# Patient Record
Sex: FEMALE | Race: WHITE | NOT HISPANIC OR LATINO | Employment: OTHER | URBAN - METROPOLITAN AREA
[De-identification: names, ages, dates, MRNs, and addresses within clinical notes are randomized per-mention and may not be internally consistent; named-entity substitution may affect disease eponyms.]

---

## 2017-01-13 ENCOUNTER — PATIENT MESSAGE (OUTPATIENT)
Dept: ENDOCRINOLOGY | Facility: CLINIC | Age: 52
End: 2017-01-13

## 2017-06-21 ENCOUNTER — LAB VISIT (OUTPATIENT)
Dept: LAB | Facility: HOSPITAL | Age: 52
End: 2017-06-21
Attending: INTERNAL MEDICINE
Payer: COMMERCIAL

## 2017-06-21 DIAGNOSIS — E03.9 HYPOTHYROIDISM: ICD-10-CM

## 2017-06-21 LAB
T4 FREE SERPL-MCNC: 0.63 NG/DL
TSH SERPL DL<=0.005 MIU/L-ACNC: 7.18 UIU/ML

## 2017-06-21 PROCEDURE — 36415 COLL VENOUS BLD VENIPUNCTURE: CPT | Mod: PO

## 2017-06-21 PROCEDURE — 84439 ASSAY OF FREE THYROXINE: CPT

## 2017-06-21 PROCEDURE — 84443 ASSAY THYROID STIM HORMONE: CPT

## 2017-06-22 ENCOUNTER — OFFICE VISIT (OUTPATIENT)
Dept: ENDOCRINOLOGY | Facility: CLINIC | Age: 52
End: 2017-06-22
Payer: COMMERCIAL

## 2017-06-22 VITALS
DIASTOLIC BLOOD PRESSURE: 78 MMHG | RESPIRATION RATE: 20 BRPM | SYSTOLIC BLOOD PRESSURE: 112 MMHG | BODY MASS INDEX: 28.34 KG/M2 | WEIGHT: 159.94 LBS | HEART RATE: 76 BPM | HEIGHT: 63 IN

## 2017-06-22 DIAGNOSIS — E03.9 HYPOTHYROIDISM, UNSPECIFIED TYPE: Primary | ICD-10-CM

## 2017-06-22 PROCEDURE — 99213 OFFICE O/P EST LOW 20 MIN: CPT | Mod: S$GLB,,, | Performed by: INTERNAL MEDICINE

## 2017-06-22 PROCEDURE — 99999 PR PBB SHADOW E&M-EST. PATIENT-LVL III: CPT | Mod: PBBFAC,,, | Performed by: INTERNAL MEDICINE

## 2017-06-22 RX ORDER — BUPROPION HYDROCHLORIDE 300 MG/1
300 TABLET ORAL DAILY
COMMUNITY
Start: 2017-06-11 | End: 2019-09-16

## 2017-06-22 RX ORDER — METHYLPREDNISOLONE 4 MG/1
TABLET ORAL
COMMUNITY
Start: 2017-05-16 | End: 2017-12-27 | Stop reason: ALTCHOICE

## 2017-06-22 RX ORDER — ROSUVASTATIN CALCIUM 10 MG/1
10 TABLET, COATED ORAL DAILY
COMMUNITY
Start: 2017-06-02 | End: 2023-06-05 | Stop reason: SDUPTHER

## 2017-06-22 RX ORDER — QUETIAPINE FUMARATE 100 MG/1
TABLET, FILM COATED ORAL
COMMUNITY
Start: 2017-06-02 | End: 2017-11-29

## 2017-06-22 RX ORDER — TOPIRAMATE 100 MG/1
CAPSULE, EXTENDED RELEASE ORAL
COMMUNITY
Start: 2017-06-02 | End: 2017-11-29

## 2017-06-22 RX ORDER — FLUOXETINE HYDROCHLORIDE 40 MG/1
CAPSULE ORAL
COMMUNITY
Start: 2017-06-10 | End: 2017-11-29

## 2017-06-22 RX ORDER — LEVOTHYROXINE SODIUM 50 UG/1
50 TABLET ORAL
Qty: 30 TABLET | Refills: 12 | Status: SHIPPED | OUTPATIENT
Start: 2017-06-22 | End: 2017-12-02 | Stop reason: SDUPTHER

## 2017-06-22 RX ORDER — TRAMADOL HYDROCHLORIDE 50 MG/1
TABLET ORAL
COMMUNITY
Start: 2017-06-02 | End: 2017-11-21 | Stop reason: ALTCHOICE

## 2017-06-22 NOTE — PROGRESS NOTES
CHIEF COMPLAINT: Hypothyroid/Goiter  52 year old being seen as a f/u. On synthroid 50 mcg. Has been out for 2 months. Has chronic diarrhea that is unchanged. No change in hair loss or dry skin.         PAST MEDICAL HISTORY/PAST SURGICAL HISTORY:  Reviewed in [facility]    SOCIAL HISTORY:  Smokes 1/2 ppd. Social alcohol    FAMILY HISTORY:  No known thyroid cancer. + Hypothyroidism. No DM. Heart disease    MEDICATIONS/ALLERGIES: The patient's MedCard has been updated and reviewed.      ROS:   Constitutional: weight stable  Eyes: No recent visual changes  ENT: No dysphagia  Cardiovascular: Denies current anginal symptoms  Respiratory: Denies current respiratory difficulty  Gastrointestinal: Has occasional alternating diarrhea or constipation.   GenitoUrinary - No dysuria  Skin: No new skin rash  Neurologic: No focal neurologic complaints  Remainder ROS negative        PE:    GENERAL: Well developed, well nourished.  PSYCH:  appropriate mood and affect  EYES:  PERRL, EOM intact.  ENT: Nares patent, oropharynx clear, mucosa pink,   NECK: Supple, trachea midline, thyroid prominent but no distinct nodule palpable  CHEST: Resp even and unlabored, CTA bilateral.  CARDIAC: RRR, S1, S2 heard, no murmurs, rubs, S3, or S4      Results for MOLINA SALAS (MRN [id number]) as of 6/22/2017 13:48   Ref. Range 6/21/2017 07:13   TSH Latest Ref Range: 0.400 - 4.000 uIU/mL 7.181 (H)   Free T4 Latest Ref Range: 0.71 - 1.51 ng/dL 0.63 (L)           ASSESSMENT/PLAN:  1. Hypothyroidism- Has been out of synthroid. Will resend her RX and check 6 weeks after taking. No change from baseline.     FOLLOWUP:  6 weeks- TSH  F/U 1 year with TSH

## 2017-11-27 ENCOUNTER — TELEPHONE (OUTPATIENT)
Dept: UROLOGY | Facility: CLINIC | Age: 52
End: 2017-11-27

## 2017-11-27 NOTE — TELEPHONE ENCOUNTER
----- Message from Keila Raza sent at 11/24/2017  2:16 PM CST -----  Schedule a f/u visit from [facility] for next week.  Please call patient at [phone].

## 2017-11-29 ENCOUNTER — OFFICE VISIT (OUTPATIENT)
Dept: UROLOGY | Facility: CLINIC | Age: 52
End: 2017-11-29
Payer: COMMERCIAL

## 2017-11-29 VITALS
BODY MASS INDEX: 29.72 KG/M2 | HEART RATE: 87 BPM | DIASTOLIC BLOOD PRESSURE: 73 MMHG | WEIGHT: 167.75 LBS | HEIGHT: 63 IN | SYSTOLIC BLOOD PRESSURE: 112 MMHG

## 2017-11-29 DIAGNOSIS — R35.0 FREQUENCY OF MICTURITION: Primary | ICD-10-CM

## 2017-11-29 DIAGNOSIS — N32.81 OAB (OVERACTIVE BLADDER): ICD-10-CM

## 2017-11-29 DIAGNOSIS — R33.9 INCOMPLETE BLADDER EMPTYING: ICD-10-CM

## 2017-11-29 LAB
BILIRUB SERPL-MCNC: NORMAL MG/DL
BLOOD URINE, POC: 250
COLOR, POC UA: YELLOW
GLUCOSE UR QL STRIP: NORMAL
KETONES UR QL STRIP: NORMAL
LEUKOCYTE ESTERASE URINE, POC: NORMAL
NITRITE, POC UA: NORMAL
PH, POC UA: 5
PROTEIN, POC: NORMAL
SPECIFIC GRAVITY, POC UA: 1.02
UROBILINOGEN, POC UA: NORMAL

## 2017-11-29 PROCEDURE — 81002 URINALYSIS NONAUTO W/O SCOPE: CPT | Mod: S$GLB,,, | Performed by: UROLOGY

## 2017-11-29 PROCEDURE — 99999 PR PBB SHADOW E&M-EST. PATIENT-LVL III: CPT | Mod: PBBFAC,,, | Performed by: UROLOGY

## 2017-11-29 PROCEDURE — 87086 URINE CULTURE/COLONY COUNT: CPT

## 2017-11-29 PROCEDURE — 99204 OFFICE O/P NEW MOD 45 MIN: CPT | Mod: 25,S$GLB,, | Performed by: UROLOGY

## 2017-11-29 RX ORDER — SUMATRIPTAN SUCCINATE 11 MG/1
CAPSULE NASAL
COMMUNITY
Start: 2017-10-10 | End: 2020-05-29

## 2017-11-29 RX ORDER — TRAZODONE HYDROCHLORIDE 100 MG/1
100 TABLET ORAL NIGHTLY
COMMUNITY
Start: 2017-11-01 | End: 2019-12-16

## 2017-11-29 RX ORDER — BUPROPION HYDROCHLORIDE 150 MG/1
150 TABLET ORAL DAILY
COMMUNITY
Start: 2017-11-01 | End: 2019-09-16

## 2017-11-29 NOTE — LETTER
November 29, 2017      Jian Ugalde MD  [address] [facility] [address]           [facility]  [address] [facility] [address] [place] [address]  Phone: [phone]          Patient: Delaney Noel   MR Number: [id number]   YOB: 1965   Date of Visit: 11/29/2017       Dear Dr. Jian Ugalde:    Thank you for referring Delaney Noel to me for evaluation. Attached you will find relevant portions of my assessment and plan of care.    If you have questions, please do not hesitate to call me. I look forward to following Delaney Noel along with you.    Sincerely,    Pola Coats MD    Enclosure  CC:  No Recipients    If you would like to receive this communication electronically, please contact [email or web address] or [phone] to request more information on [employer] Link access.    For providers and/or their staff who would like to refer a patient to [facility], please contact us through our one-stop-shop provider referral line, [facility], at [phone].    If you feel you have received this communication in error or would no longer like to receive these types of communications, please e-mail [email or web address]

## 2017-11-29 NOTE — PROGRESS NOTES
UROLOGY [facility]  11 29 17    Urinalysis: col yellow, sg 25, pH 5, leuco trace, nitrites -, prot trace, glucose -, bili -, blood 250    c-c urinary frequency    Age 52, started with L back pain, low, intermittent, without radiation, and also with sudden onset of urinary frequency and urgency. Minimal urinary incontinence.     A CT scan done at ER [facility] did not show any stones.  Sent home with pain medication and muscle relaxants. The pain was assumed to be of a musculoskeletal nature. Because the pain was quite persistent, she had a CT scan:      Nov 21 2017:CT SCAN  Procedure: CT of the abdomen and pelvis without IV contrast.  Technique: Axial images of the abdomen and pelvis were obtained without intravenous contrast administration. Coronal and sagittal reconstructions were provided. Total DLP was 788 mGy-cm.  Dose lowering technique, automated exposure control, was utilized for this exam.  History: Left flank pain and hematuria.  Comparison: Renal ultrasound 7/17/2014.   Findings: There is no hydronephrosis or nephrolithiasis. There is no urolithiasis. Urinary bladder is normal.  There is minimal dependent atelectasis in the bilateral lung bases. Heart is normal in size. The liver is homogeneous in attenuation. The gallbladder, spleen, pancreas, and adrenal glands are normal. The stomach and small bowel are normal. The appendix is normal. The colon is normal. There is no pelvic or retroperitoneal lymphadenopathy. Abdominal aorta is non-aneurysmal. There is no lytic or blastic osseous lesions.     Minimal bilateral lower lobe atelectasis without acute abnormality of the abdomen and pelvis. There is no finding to account for left flank pain or hematuria.     Pt was seen over 3 years ago for difficulty voiding and we did a urologic workup at the time. The urethra was found to be tight and we dilated it. Cystoscopy was normal. Bladderscan showed no residual. Renal ultrasound was normal.    Pt says she improved her  voiding after she had a urethral dilatation. She now believes the urethra may be showing similar changes.      PAST MEDICAL HISTORY:    SURGICAL:  Hysterectomy.     MEDICAL:  Thyroid disease, migraines, degenerative joint disease.     FAMILIAL:  No parents or siblings with stone disease, son with stone disease. No fh of genitourinary malignancy     SOCIAL:  The patient is , lives in [place].     ALLERGIES:  NKDA.       Current Outpatient Prescriptions on File Prior to Visit   Medication Sig Dispense Refill    alprazolam (XANAX) 0.5 MG tablet nightly as needed.       atenolol (TENORMIN) 50 MG tablet TAKE 1 TABLET (50 MG TOTAL) BY 90 tablet 1    buPROPion (WELLBUTRIN XL) 300 MG 24 hr tablet Take 300 mg by mouth once daily.       hydrocodone-acetaminophen 5-325mg (NORCO) 5-325 mg per tablet Take 1 tablet by mouth every 4 (four) hours as 12 tablet 0    ibuprofen (ADVIL,MOTRIN) 800 MG tablet Take 800 mg by mouth 3 (three) times dena      levothyroxine (SYNTHROID) 50 MCG tablet Take 1 tablet (50 mcg total) by mouth b 30 tablet 12    meloxicam (MOBIC) 7.5 MG tablet Take 7.5 mg by mouth 2 (two) times daily.  2    methylPREDNISolone (MEDROL DOSEPACK) 4 mg tablet Take by mouth as needed.       rosuvastatin (CRESTOR) 10 MG tablet Take 10 mg by mouth once daily.       sumatriptan (IMITREX) 100 MG tablet Take 100 mg by mouth every 2 (two)   6     PHYSICAL EXAMINATION:  ABDOMEN:  Flat.  No masses.  CVA is negative.  No organomegaly or tenderness.     IMPRESSION:   Overactive bladder, with urinary frequency, urgency and occasional small urinary urge incontinence.     Possible recurrence of urethral stenosis    I recommend urethral calibration and dilatation, cystoscopy in office  No imaging of the kidneys is being ordered, since she recently had a Ct scan  bladderscan to measure her residual volume    Perhaps should start her treatment for oab with vesicare or ditropan.    Urine culture sent

## 2017-11-30 LAB — BACTERIA UR CULT: NO GROWTH

## 2017-11-30 NOTE — PROGRESS NOTES
"HISTORY OF PRESENT ILLNESS:  Pt. is a 52 y.o. female presents for ER followup:    "Attending ED Notes:   Urinalysis shows 5 red cells with 3+ occult blood.  No signs of infection.  X-rays of lumbar spine show lower lumbar facet arthropathy.  No fractures.  No listhesis.  CT renal stone protocol was performed in light of hermicroscopic hematuria with flank pain and revealed no evidence for renal stones or inflammation to the kidneys. Discussed findings in detail with the patient.  We'll need follow-up with urologist for further evaluation.  Will return if any problem's, worsening of condition"    She saw urology 2 days ago:    "IMPRESSION:   Overactive bladder, with urinary frequency, urgency and occasional small urinary urge incontinence.      Possible recurrence of urethral stenosis     I recommend urethral calibration and dilatation, cystoscopy in office  No imaging of the kidneys is being ordered, since she recently had a Ct scan  bladderscan to measure her residual volume     Perhaps should start her treatment for oab with vesicare or ditropan.     Urine culture sent"    She states today is the best she has had, stiffness is better.    She had an elevated TSH 6/21/17, I do not see that she ever repeat TSH for Dr. Richards. LDL was 200 a year ago, is on Crestor through Dr. Kim, states had lab done at [facility], thinks last done 9/17, states dr. Kim was pleased with her cholesterol at that time.  I have not seen her since 5/11/16.  She has had hysterectomy, needs GYN visit.       Health Maintenance Topics with due status: Not Due       Topic Last Completion Date    TETANUS VACCINE 06/01/2010    Colonoscopy 08/04/2014    Lipid Panel 11/29/2016     Health Maintenance Due   Topic Date Due    Mammogram  05/17/2017       Lab Results   Component Value Date    WBC 6.22 05/14/2016    HGB 13.8 05/14/2016    HCT 42.5 05/14/2016     05/14/2016    CHOL 284 (H) 05/14/2016    TRIG 222 (H) 05/14/2016    HDL 39 (L) " 05/14/2016    LDLCALC 200.6 (H) 05/14/2016    ALT 21 05/14/2016    AST 21 05/14/2016     05/14/2016    K 4.1 05/14/2016     05/14/2016    CREATININE 1.0 05/14/2016    BUN 21 (H) 05/14/2016    CO2 24 05/14/2016    ALBUMIN 3.7 05/14/2016    TSH 7.181 (H) 06/21/2017       Past Medical History:   Diagnosis Date    Allergy     Depression     Migraine     Thyroid disease     Hypothyroid, MNG       Past Surgical History:   Procedure Laterality Date    cystoscope      HYSTERECTOMY      JAMIE with BSO- age 42       Social History     Social History    Marital status:      Spouse name: N/A    Number of children: 2    Years of education: N/A     Social History Main Topics    Smoking status: Former Smoker     Packs/day: 0.25     Quit date: 6/2/2015    Smokeless tobacco: Never Used      Comment: had quit x 2    Alcohol use Yes      Comment: once a week     Drug use: No    Sexual activity: Yes     Partners: Male     Other Topics Concern    Not on file     Social History Narrative    No narrative on file       ROS:  GENERAL: No fever, chills, fatigability; no weight loss; gained 10 lbs over the last 3 months, but states se is starting to lose.  SKIN: No rashes, itching or changes in color or texture of skin.  HEAD: Positive chronic headaches/over the last month, once a week/sees Dr. Can; no recent head trauma.  EARS: Denies ear pain, discharge or vertigo.  NOSE: No loss of smell, no epistaxis; positive postnasal drip.  MOUTH & THROAT: No hoarseness or change in voice. No excessive gum bleeding.  NODES: Denies swollen glands.  CHEST: Denies ARVIZU, cyanosis, wheezing, cough and sputum production.  CARDIOVASCULAR: Occ chest pain/sees Dr. Kim, no PND, orthopnea or reduced exercise tolerance.  ABDOMEN: Appetite fine. No weight loss. Denies constipation, diarrhea, abdominal pain, hematemesis or blood in stool.  URINARY: positive recent flank pain, dysuria or hematuria.  PERIPHERAL VASCULAR: No  claudication or cyanosis. No edema.  MUSCULOSKELETAL: Positive joint stiffness to hands; no swelling. Denies back pain.  NEUROLOGIC: Denies numbness    PE:   Vitals:   Vitals:    12/01/17 0828   BP: 118/78   Pulse: 77   Resp: 18     GENERAL: no acute distress, A&Ox3, comfortable.  Female with BMI of 29   HEENT: tympanic membranes clear, nasal mucosa pink, no pharyngeal erythema or exudate  NECK: supple, no cervical lymphadenopathy, no thyromegaly; no supraclavicular nodes;   CHEST:  clear to auscultation bilaterally, no crackles or wheeze; no increased work of breathing;  CARDIOVASCULAR: regular rate and rhythm, no rubs, murmurs or gallops.  ABDOMEN: normal bowel sounds, soft non-tender, non-distended; no palpable organomegaly;   EXT: no clubbing, cyanosis or edema.     ASSESSMENT/PLAN:    Left flank pain: I have reviewed her notes from ER and urology visit; pain improving;    Hyperlipidemia, unspecified hyperlipidemia type  -     Comprehensive metabolic panel; Future; Expected date: 12/01/2017  -     Cancel: Lipid panel; Future; Expected date: 12/01/2017    Hypothyroidism, unspecified type  -     CBC auto differential; Future; Expected date: 12/01/2017  -     TSH; Future; Expected date: 12/01/2017    Health care maintenance: she is UTD with flu and tetanus; had flu shot 2 days ago;  -     Mammo Digital Screening Bilat with CAD; Future; Expected date: 12/01/2017  -     Ambulatory Referral to Obstetrics / Gynecology        Medication List with Changes/Refills   Current Medications    ALPRAZOLAM (XANAX) 0.5 MG TABLET    nightly as needed.     ATENOLOL (TENORMIN) 50 MG TABLET    TAKE 1 TABLET (50 MG TOTAL) BY MOUTH ONCE DAILY.    BUPROPION (WELLBUTRIN XL) 150 MG TB24 TABLET    Take 150 mg by mouth once daily.     BUPROPION (WELLBUTRIN XL) 300 MG 24 HR TABLET    Take 300 mg by mouth once daily.     HYDROCODONE-ACETAMINOPHEN 5-325MG (NORCO) 5-325 MG PER TABLET    Take 1 tablet by mouth every 4 (four) hours as needed for  Pain.    IBUPROFEN (ADVIL,MOTRIN) 800 MG TABLET    Take 800 mg by mouth 3 (three) times daily as needed.     LEVOTHYROXINE (SYNTHROID) 50 MCG TABLET    Take 1 tablet (50 mcg total) by mouth before breakfast.    MELOXICAM (MOBIC) 7.5 MG TABLET    Take 7.5 mg by mouth 2 (two) times daily.    METHYLPREDNISOLONE (MEDROL DOSEPACK) 4 MG TABLET    Take by mouth as needed.     ONZETRA XSAIL 11 MG AEPB    as needed.     ROSUVASTATIN (CRESTOR) 10 MG TABLET    Take 10 mg by mouth once daily.     SUMATRIPTAN (IMITREX) 100 MG TABLET    Take 100 mg by mouth every 2 (two) hours as needed.     TRAZODONE (DESYREL) 100 MG TABLET    Take 100 mg by mouth every evening.      Call if condition changes or worsens.

## 2017-12-01 ENCOUNTER — LAB VISIT (OUTPATIENT)
Dept: LAB | Facility: HOSPITAL | Age: 52
End: 2017-12-01
Attending: INTERNAL MEDICINE
Payer: COMMERCIAL

## 2017-12-01 ENCOUNTER — OFFICE VISIT (OUTPATIENT)
Dept: INTERNAL MEDICINE | Facility: CLINIC | Age: 52
End: 2017-12-01
Payer: COMMERCIAL

## 2017-12-01 VITALS
DIASTOLIC BLOOD PRESSURE: 78 MMHG | RESPIRATION RATE: 18 BRPM | SYSTOLIC BLOOD PRESSURE: 118 MMHG | OXYGEN SATURATION: 98 % | HEART RATE: 77 BPM | WEIGHT: 168 LBS | BODY MASS INDEX: 29.76 KG/M2

## 2017-12-01 DIAGNOSIS — Z00.00 HEALTH CARE MAINTENANCE: ICD-10-CM

## 2017-12-01 DIAGNOSIS — E03.9 HYPOTHYROIDISM, UNSPECIFIED TYPE: ICD-10-CM

## 2017-12-01 DIAGNOSIS — E78.5 HYPERLIPIDEMIA, UNSPECIFIED HYPERLIPIDEMIA TYPE: ICD-10-CM

## 2017-12-01 DIAGNOSIS — R10.9 LEFT FLANK PAIN: Primary | ICD-10-CM

## 2017-12-01 LAB
ALBUMIN SERPL BCP-MCNC: 3.7 G/DL
ALP SERPL-CCNC: 63 U/L
ALT SERPL W/O P-5'-P-CCNC: 26 U/L
ANION GAP SERPL CALC-SCNC: 8 MMOL/L
AST SERPL-CCNC: 22 U/L
BASOPHILS # BLD AUTO: 0.01 K/UL
BASOPHILS NFR BLD: 0.2 %
BILIRUB SERPL-MCNC: 0.9 MG/DL
BUN SERPL-MCNC: 19 MG/DL
CALCIUM SERPL-MCNC: 9.5 MG/DL
CHLORIDE SERPL-SCNC: 105 MMOL/L
CO2 SERPL-SCNC: 28 MMOL/L
CREAT SERPL-MCNC: 0.9 MG/DL
DIFFERENTIAL METHOD: ABNORMAL
EOSINOPHIL # BLD AUTO: 0.1 K/UL
EOSINOPHIL NFR BLD: 3 %
ERYTHROCYTE [DISTWIDTH] IN BLOOD BY AUTOMATED COUNT: 12.1 %
EST. GFR  (AFRICAN AMERICAN): >60 ML/MIN/1.73 M^2
EST. GFR  (NON AFRICAN AMERICAN): >60 ML/MIN/1.73 M^2
GLUCOSE SERPL-MCNC: 100 MG/DL
HCT VFR BLD AUTO: 39.2 %
HGB BLD-MCNC: 13.1 G/DL
IMM GRANULOCYTES # BLD AUTO: 0.01 K/UL
IMM GRANULOCYTES NFR BLD AUTO: 0.2 %
LYMPHOCYTES # BLD AUTO: 0.9 K/UL
LYMPHOCYTES NFR BLD: 20.2 %
MCH RBC QN AUTO: 28.7 PG
MCHC RBC AUTO-ENTMCNC: 33.4 G/DL
MCV RBC AUTO: 86 FL
MONOCYTES # BLD AUTO: 0.4 K/UL
MONOCYTES NFR BLD: 10 %
NEUTROPHILS # BLD AUTO: 2.9 K/UL
NEUTROPHILS NFR BLD: 66.4 %
NRBC BLD-RTO: 0 /100 WBC
PLATELET # BLD AUTO: 205 K/UL
PMV BLD AUTO: 11.2 FL
POTASSIUM SERPL-SCNC: 4.2 MMOL/L
PROT SERPL-MCNC: 6.9 G/DL
RBC # BLD AUTO: 4.56 M/UL
SODIUM SERPL-SCNC: 141 MMOL/L
T4 FREE SERPL-MCNC: 1.07 NG/DL
TSH SERPL DL<=0.005 MIU/L-ACNC: 4.04 UIU/ML
WBC # BLD AUTO: 4.4 K/UL

## 2017-12-01 PROCEDURE — 36415 COLL VENOUS BLD VENIPUNCTURE: CPT | Mod: PO

## 2017-12-01 PROCEDURE — 80053 COMPREHEN METABOLIC PANEL: CPT

## 2017-12-01 PROCEDURE — 85025 COMPLETE CBC W/AUTO DIFF WBC: CPT

## 2017-12-01 PROCEDURE — 84439 ASSAY OF FREE THYROXINE: CPT

## 2017-12-01 PROCEDURE — 99214 OFFICE O/P EST MOD 30 MIN: CPT | Mod: S$GLB,,, | Performed by: INTERNAL MEDICINE

## 2017-12-01 PROCEDURE — 99999 PR PBB SHADOW E&M-EST. PATIENT-LVL III: CPT | Mod: PBBFAC,,, | Performed by: INTERNAL MEDICINE

## 2017-12-01 PROCEDURE — 84443 ASSAY THYROID STIM HORMONE: CPT

## 2017-12-02 DIAGNOSIS — E03.9 HYPOTHYROIDISM, UNSPECIFIED TYPE: Primary | ICD-10-CM

## 2017-12-02 RX ORDER — LEVOTHYROXINE SODIUM 75 UG/1
75 TABLET ORAL
Qty: 30 TABLET | Refills: 1 | Status: SHIPPED | OUTPATIENT
Start: 2017-12-02 | End: 2017-12-11 | Stop reason: ALTCHOICE

## 2017-12-02 NOTE — PROGRESS NOTES
HISTORY OF PRESENT ILLNESS:  Pt. is a 52 y.o. female presents     Health Maintenance Topics with due status: Not Due       Topic Last Completion Date    TETANUS VACCINE 06/01/2010    Colonoscopy 08/04/2014    Lipid Panel 11/29/2016     Health Maintenance Due   Topic Date Due    Mammogram  05/17/2017       Lab Results   Component Value Date    WBC 4.40 12/01/2017    HGB 13.1 12/01/2017    HCT 39.2 12/01/2017     12/01/2017    CHOL 284 (H) 05/14/2016    TRIG 222 (H) 05/14/2016    HDL 39 (L) 05/14/2016    LDLCALC 200.6 (H) 05/14/2016    ALT 26 12/01/2017    AST 22 12/01/2017     12/01/2017    K 4.2 12/01/2017     12/01/2017    CREATININE 0.9 12/01/2017    BUN 19 12/01/2017    CO2 28 12/01/2017    ALBUMIN 3.7 12/01/2017    TSH 4.038 (H) 12/01/2017       Past Medical History:   Diagnosis Date    Allergy     Depression     Migraine     Thyroid disease     Hypothyroid, MNG       Past Surgical History:   Procedure Laterality Date    cystoscope      HYSTERECTOMY      JAMIE with BSO- age 42       Social History     Social History    Marital status:      Spouse name: N/A    Number of children: 2    Years of education: N/A     Social History Main Topics    Smoking status: Former Smoker     Packs/day: 0.25     Quit date: 6/2/2015    Smokeless tobacco: Never Used      Comment: had quit x 2    Alcohol use Yes      Comment: once a week     Drug use: No    Sexual activity: Yes     Partners: Male     Other Topics Concern    Not on file     Social History Narrative    No narrative on file       ROS:  GENERAL: No fever, chills, fatigability or weight loss.  SKIN: No rashes, itching or changes in color or texture of skin.  HEAD: No headaches or recent head trauma.  EARS: Denies ear pain, discharge or vertigo.  NOSE: No loss of smell, no epistaxis or postnasal drip.  MOUTH & THROAT: No hoarseness or change in voice. No excessive gum bleeding.  NODES: Denies swollen glands.  CHEST: Denies ARVIZU,  cyanosis, wheezing, cough and sputum production.  CARDIOVASCULAR: Denies chest pain, PND, orthopnea or reduced exercise tolerance.  ABDOMEN: Appetite fine. No weight loss. Denies constipation, diarrhea, abdominal pain, hematemesis or blood in stool.  URINARY: No flank pain, dysuria or hematuria.  PERIPHERAL VASCULAR: No claudication or cyanosis. No edema.  MUSCULOSKELETAL: No joint stiffness or swelling. Denies back pain.  NEUROLOGIC: Denies numbness    PE:   Vitals:   There were no vitals filed for this visit.  GENERAL: no acute distress, A&Ox3, comfortable.   HEENT: tympanic membranes clear, nasal mucosa pink, no pharyngeal erythema or exudate  NECK: supple, no cervical lymphadenopathy, no thyromegaly; no supraclavicular nodes;   CHEST:  clear to auscultation bilaterally, no crackles or wheeze; no increased work of breathing;  CARDIOVASCULAR: regular rate and rhythm, no rubs, murmurs or gallops.  ABDOMEN: normal bowel sounds, soft non-tender, non-distended; no palpable organomegaly;   EXT: no clubbing, cyanosis or edema.     ASSESSMENT/PLAN:    Hypothyroidism, unspecified type  -     TSH; Future; Expected date: 01/02/2018    Other orders  -     levothyroxine (SYNTHROID) 75 MCG tablet; Take 1 tablet (75 mcg total) by mouth before breakfast.  Dispense: 30 tablet; Refill: 1        Medication List with Changes/Refills   Current Medications    ALPRAZOLAM (XANAX) 0.5 MG TABLET    nightly as needed.     ATENOLOL (TENORMIN) 50 MG TABLET    TAKE 1 TABLET (50 MG TOTAL) BY MOUTH ONCE DAILY.    BUPROPION (WELLBUTRIN XL) 150 MG TB24 TABLET    Take 150 mg by mouth once daily.     BUPROPION (WELLBUTRIN XL) 300 MG 24 HR TABLET    Take 300 mg by mouth once daily.     HYDROCODONE-ACETAMINOPHEN 5-325MG (NORCO) 5-325 MG PER TABLET    Take 1 tablet by mouth every 4 (four) hours as needed for Pain.    IBUPROFEN (ADVIL,MOTRIN) 800 MG TABLET    Take 800 mg by mouth 3 (three) times daily as needed.     MELOXICAM (MOBIC) 7.5 MG TABLET     Take 7.5 mg by mouth 2 (two) times daily.    METHYLPREDNISOLONE (MEDROL DOSEPACK) 4 MG TABLET    Take by mouth as needed.     ONZETRA XSAIL 11 MG AEPB    as needed.     ROSUVASTATIN (CRESTOR) 10 MG TABLET    Take 10 mg by mouth once daily.     SUMATRIPTAN (IMITREX) 100 MG TABLET    Take 100 mg by mouth every 2 (two) hours as needed.     TRAZODONE (DESYREL) 100 MG TABLET    Take 100 mg by mouth every evening.    Changed and/or Refilled Medications    Modified Medication Previous Medication    LEVOTHYROXINE (SYNTHROID) 75 MCG TABLET levothyroxine (SYNTHROID) 50 MCG tablet       Take 1 tablet (75 mcg total) by mouth before breakfast.    Take 1 tablet (50 mcg total) by mouth before breakfast.     Call if condition changes or worsens.

## 2017-12-04 ENCOUNTER — TELEPHONE (OUTPATIENT)
Dept: FAMILY MEDICINE | Facility: CLINIC | Age: 52
End: 2017-12-04

## 2017-12-04 NOTE — TELEPHONE ENCOUNTER
Phoned pt in regards to test results per Dr Echeverria's instructions. Pt voiced understanding and scheduled lab for 1/5/18. CLC

## 2017-12-04 NOTE — TELEPHONE ENCOUNTER
----- Message from Hanna Echeverria MD sent at 12/2/2017  9:37 AM CST -----  Please notify pt. That her TSH is still not at goal.  Have her increase to 75 mcg and repeat TSH in one month.

## 2017-12-11 RX ORDER — LEVOTHYROXINE SODIUM 50 UG/1
50 TABLET ORAL DAILY
Qty: 90 TABLET | Refills: 3 | Status: SHIPPED | OUTPATIENT
Start: 2017-12-11 | End: 2019-09-16

## 2017-12-27 ENCOUNTER — HOSPITAL ENCOUNTER (OUTPATIENT)
Dept: RADIOLOGY | Facility: HOSPITAL | Age: 52
Discharge: HOME | End: 2017-12-27
Attending: INTERNAL MEDICINE
Payer: COMMERCIAL

## 2017-12-27 ENCOUNTER — OFFICE VISIT (OUTPATIENT)
Dept: OBSTETRICS AND GYNECOLOGY | Facility: CLINIC | Age: 52
End: 2017-12-27
Payer: COMMERCIAL

## 2017-12-27 VITALS
SYSTOLIC BLOOD PRESSURE: 110 MMHG | HEART RATE: 76 BPM | BODY MASS INDEX: 29.84 KG/M2 | WEIGHT: 168.44 LBS | HEIGHT: 63 IN | DIASTOLIC BLOOD PRESSURE: 80 MMHG

## 2017-12-27 DIAGNOSIS — Z12.31 VISIT FOR SCREENING MAMMOGRAM: ICD-10-CM

## 2017-12-27 DIAGNOSIS — Z01.419 WELL WOMAN EXAM WITH ROUTINE GYNECOLOGICAL EXAM: Primary | ICD-10-CM

## 2017-12-27 DIAGNOSIS — Z00.00 HEALTH CARE MAINTENANCE: ICD-10-CM

## 2017-12-27 DIAGNOSIS — Z11.51 SCREENING FOR HPV (HUMAN PAPILLOMAVIRUS): ICD-10-CM

## 2017-12-27 DIAGNOSIS — Z12.4 PAP SMEAR FOR CERVICAL CANCER SCREENING: ICD-10-CM

## 2017-12-27 PROCEDURE — 77067 SCR MAMMO BI INCL CAD: CPT | Mod: 26,,, | Performed by: RADIOLOGY

## 2017-12-27 PROCEDURE — 87624 HPV HI-RISK TYP POOLED RSLT: CPT

## 2017-12-27 PROCEDURE — 77063 BREAST TOMOSYNTHESIS BI: CPT | Mod: 26,,, | Performed by: RADIOLOGY

## 2017-12-27 PROCEDURE — 99386 PREV VISIT NEW AGE 40-64: CPT | Mod: S$GLB,,, | Performed by: OBSTETRICS & GYNECOLOGY

## 2017-12-27 PROCEDURE — 99999 PR PBB SHADOW E&M-EST. PATIENT-LVL III: CPT | Mod: PBBFAC,,, | Performed by: OBSTETRICS & GYNECOLOGY

## 2017-12-27 PROCEDURE — 88175 CYTOPATH C/V AUTO FLUID REDO: CPT

## 2017-12-27 PROCEDURE — 77067 SCR MAMMO BI INCL CAD: CPT | Mod: TC,PO

## 2017-12-27 NOTE — PROGRESS NOTES
Subjective:       Patient ID: Delaney Noel is a 52 y.o. female.    Chief Complaint:  Well Woman and Routine gynecological exam      History of Present Illness  HPI  Annual Exam-Postmenopausal  Patient presents for annual exam. The patient has no complaints today. The patient is not currently sexually active. GYN screening history: no prior history of gyn screening tests. The patient is not taking hormone replacement therapy. Patient denies post-menopausal vaginal bleeding. The patient wears seatbelts: yes. The patient participates in regular exercise: not asked. Has the patient ever been transfused or tattooed?: not asked. The patient reports that there is not domestic violence in her life.    GYN & OB History  No LMP recorded. Patient has had a hysterectomy.   Date of Last Pap: No result found    OB History    Para Term  AB Living   2 2 2         SAB TAB Ectopic Multiple Live Births                  # Outcome Date GA Lbr Anthony/2nd Weight Sex Delivery Anes PTL Lv   2 Term            1 Term                   Review of Systems  Review of Systems   Constitutional: Negative for activity change, appetite change, chills, diaphoresis, fatigue, fever and unexpected weight change.   HENT: Negative for mouth sores and tinnitus.    Eyes: Negative for discharge and visual disturbance.   Respiratory: Negative for cough, shortness of breath and wheezing.    Cardiovascular: Negative for chest pain, palpitations and leg swelling.   Gastrointestinal: Negative for abdominal pain, bloating, blood in stool, constipation, diarrhea, nausea and vomiting.   Endocrine: Positive for hypothyroidism. Negative for diabetes, hair loss, hot flashes and hyperthyroidism.   Genitourinary: Negative for decreased libido, dyspareunia, dysuria, flank pain, frequency, genital sores, hematuria, menorrhagia, menstrual problem, pelvic pain, urgency, vaginal bleeding, vaginal discharge, vaginal pain, dysmenorrhea, urinary incontinence,  postcoital bleeding, postmenopausal bleeding and vaginal odor.   Musculoskeletal: Negative for back pain, joint swelling and myalgias.   Skin:  Negative for rash, no acne and hair changes.   Neurological: Negative for seizures, syncope, numbness and headaches.   Hematological: Negative for adenopathy. Does not bruise/bleed easily.   Psychiatric/Behavioral: Positive for depression. Negative for sleep disturbance. The patient is not nervous/anxious.    Breast: Negative for breast mass, breast pain, nipple discharge and skin changes          Objective:    Physical Exam:   Constitutional: She is oriented to person, place, and time. She appears well-developed and well-nourished.    HENT:   Head: Normocephalic.    Eyes: Pupils are equal, round, and reactive to light.    Neck: Normal range of motion.    Cardiovascular: Normal rate.     Pulmonary/Chest: Effort normal.        Abdominal: Soft. She exhibits abdominal incision. She exhibits no distension.     Genitourinary: Vagina normal.   Genitourinary Comments: Pap smear done of cuff           Musculoskeletal: Normal range of motion.       Neurological: She is alert and oriented to person, place, and time.    Skin: Skin is warm and dry.           Assessment:        1. Well woman exam with routine gynecological exam    2. Pap smear for cervical cancer screening    3. Screening for HPV (human papillomavirus)                Plan:      Annual exams

## 2017-12-27 NOTE — LETTER
December 27, 2017      Hanna Echeverria MD  [address] [facility] [address]  [facility] [address]           [facility] [address]  [facility] [address]  Phone: [phone]  Fax: [phone]          Patient: Delaney Noel   MR Number: [id number]   YOB: 1965   Date of Visit: 12/27/2017       Dear Dr. Hanna Echeverria:    Thank you for referring Delaney Noel to me for evaluation. Attached you will find relevant portions of my assessment and plan of care.    If you have questions, please do not hesitate to call me. I look forward to following Delaney Noel along with you.    Sincerely,    Giuliano Zaragoza MD    Enclosure  CC:  No Recipients    If you would like to receive this communication electronically, please contact [email or web address] or [phone] to request more information on [employer] Link access.    For providers and/or their staff who would like to refer a patient to [facility], please contact us through our one-stop-shop provider referral line, [facility], at [phone].    If you feel you have received this communication in error or would no longer like to receive these types of communications, please e-mail [email or web address]

## 2018-01-02 LAB
HPV16 AG SPEC QL: NEGATIVE
HPV16+18+H RISK 12 DNA CVX-IMP: NEGATIVE
HPV18 DNA SPEC QL NAA+PROBE: NEGATIVE

## 2018-10-19 ENCOUNTER — TELEPHONE (OUTPATIENT)
Dept: ENDOCRINOLOGY | Facility: CLINIC | Age: 53
End: 2018-10-19

## 2018-10-19 NOTE — TELEPHONE ENCOUNTER
----- Message from Myriam Johnston sent at 10/19/2018  3:16 PM CDT -----  Contact: [phone] or [phone]  Type:  Sooner Apoointment Request    Caller is requesting a sooner appointment.  Caller is requesting a message be sent to doctor.    Name of Caller:,MOLINA [[id number]]  When is the first available appointment?  No schedule shown, scheduled exhausted   Symptoms: thyroid   Best Call Back Number:  [phone] or [phone]  Additional Information:  Est patient, requesting an appt asap, any day anytime

## 2018-10-19 NOTE — TELEPHONE ENCOUNTER
Spoke to pt's , adv no appts avail through April 2019 and to call Nov 1st to get appt scheduled for May 2019. Also added to waitlist. He stated pt is having weight gain and other symptoms. Adv forwarding this message to Dr Richards for advice and we will get back with him, voiced understanding.

## 2018-10-23 ENCOUNTER — PATIENT MESSAGE (OUTPATIENT)
Dept: ENDOCRINOLOGY | Facility: CLINIC | Age: 53
End: 2018-10-23

## 2018-10-24 ENCOUNTER — LAB VISIT (OUTPATIENT)
Dept: LAB | Facility: HOSPITAL | Age: 53
End: 2018-10-24
Attending: INTERNAL MEDICINE
Payer: COMMERCIAL

## 2018-10-24 DIAGNOSIS — E03.9 HYPOTHYROIDISM, UNSPECIFIED TYPE: ICD-10-CM

## 2018-10-24 LAB — TSH SERPL DL<=0.005 MIU/L-ACNC: 3.49 UIU/ML

## 2018-10-24 PROCEDURE — 36415 COLL VENOUS BLD VENIPUNCTURE: CPT | Mod: PO

## 2018-10-24 PROCEDURE — 84443 ASSAY THYROID STIM HORMONE: CPT

## 2018-10-29 ENCOUNTER — LAB VISIT (OUTPATIENT)
Dept: LAB | Facility: HOSPITAL | Age: 53
End: 2018-10-29
Attending: INTERNAL MEDICINE
Payer: COMMERCIAL

## 2018-10-29 DIAGNOSIS — R53.81 MALAISE: ICD-10-CM

## 2018-10-29 DIAGNOSIS — E78.5 HYPERLIPEMIA: ICD-10-CM

## 2018-10-29 DIAGNOSIS — I20.89 ANGINA OF EFFORT: Primary | ICD-10-CM

## 2018-10-29 LAB
ALBUMIN SERPL BCP-MCNC: 3.9 G/DL
ALP SERPL-CCNC: 70 U/L
ALT SERPL W/O P-5'-P-CCNC: 34 U/L
ANION GAP SERPL CALC-SCNC: 7 MMOL/L
AST SERPL-CCNC: 26 U/L
BASOPHILS # BLD AUTO: 0.03 K/UL
BASOPHILS NFR BLD: 0.6 %
BILIRUB SERPL-MCNC: 1.2 MG/DL
BUN SERPL-MCNC: 14 MG/DL
CALCIUM SERPL-MCNC: 9.5 MG/DL
CHLORIDE SERPL-SCNC: 105 MMOL/L
CHOLEST SERPL-MCNC: 153 MG/DL
CHOLEST/HDLC SERPL: 3.5 {RATIO}
CO2 SERPL-SCNC: 29 MMOL/L
CREAT SERPL-MCNC: 0.9 MG/DL
DIFFERENTIAL METHOD: NORMAL
EOSINOPHIL # BLD AUTO: 0.2 K/UL
EOSINOPHIL NFR BLD: 2.8 %
ERYTHROCYTE [DISTWIDTH] IN BLOOD BY AUTOMATED COUNT: 12.1 %
EST. GFR  (AFRICAN AMERICAN): >60 ML/MIN/1.73 M^2
EST. GFR  (NON AFRICAN AMERICAN): >60 ML/MIN/1.73 M^2
GLUCOSE SERPL-MCNC: 112 MG/DL
HCT VFR BLD AUTO: 42.9 %
HDLC SERPL-MCNC: 44 MG/DL
HDLC SERPL: 28.8 %
HGB BLD-MCNC: 13.8 G/DL
IMM GRANULOCYTES # BLD AUTO: 0.01 K/UL
IMM GRANULOCYTES NFR BLD AUTO: 0.2 %
LDLC SERPL CALC-MCNC: 72.8 MG/DL
LYMPHOCYTES # BLD AUTO: 1.2 K/UL
LYMPHOCYTES NFR BLD: 21.6 %
MCH RBC QN AUTO: 29.1 PG
MCHC RBC AUTO-ENTMCNC: 32.2 G/DL
MCV RBC AUTO: 90 FL
MONOCYTES # BLD AUTO: 0.5 K/UL
MONOCYTES NFR BLD: 8.7 %
NEUTROPHILS # BLD AUTO: 3.6 K/UL
NEUTROPHILS NFR BLD: 66.1 %
NONHDLC SERPL-MCNC: 109 MG/DL
NRBC BLD-RTO: 0 /100 WBC
PLATELET # BLD AUTO: 188 K/UL
PMV BLD AUTO: 12 FL
POTASSIUM SERPL-SCNC: 3.7 MMOL/L
PROT SERPL-MCNC: 7 G/DL
RBC # BLD AUTO: 4.75 M/UL
SODIUM SERPL-SCNC: 141 MMOL/L
TRIGL SERPL-MCNC: 181 MG/DL
WBC # BLD AUTO: 5.41 K/UL

## 2018-10-29 PROCEDURE — 80061 LIPID PANEL: CPT

## 2018-10-29 PROCEDURE — 85025 COMPLETE CBC W/AUTO DIFF WBC: CPT

## 2018-10-29 PROCEDURE — 80053 COMPREHEN METABOLIC PANEL: CPT

## 2018-10-29 PROCEDURE — 36415 COLL VENOUS BLD VENIPUNCTURE: CPT | Mod: PO

## 2018-11-19 ENCOUNTER — HOSPITAL ENCOUNTER (OUTPATIENT)
Dept: RADIOLOGY | Facility: HOSPITAL | Age: 53
Discharge: HOME | End: 2018-11-19
Attending: FAMILY MEDICINE
Payer: COMMERCIAL

## 2018-11-19 DIAGNOSIS — E03.9 HYPOTHYROIDISM: ICD-10-CM

## 2018-11-19 DIAGNOSIS — E04.1 THYROID NODULE: ICD-10-CM

## 2018-11-19 PROCEDURE — 76536 US EXAM OF HEAD AND NECK: CPT | Mod: 26,,, | Performed by: RADIOLOGY

## 2018-11-19 PROCEDURE — 76536 US EXAM OF HEAD AND NECK: CPT | Mod: TC,PO

## 2019-01-11 ENCOUNTER — LAB VISIT (OUTPATIENT)
Dept: LAB | Facility: HOSPITAL | Age: 54
End: 2019-01-11
Attending: FAMILY MEDICINE
Payer: COMMERCIAL

## 2019-01-11 DIAGNOSIS — I51.9 MYXEDEMA HEART DISEASE: Primary | ICD-10-CM

## 2019-01-11 DIAGNOSIS — E03.9 MYXEDEMA HEART DISEASE: Primary | ICD-10-CM

## 2019-01-11 DIAGNOSIS — E04.1 NONTOXIC UNINODULAR GOITER: ICD-10-CM

## 2019-01-11 LAB
T4 FREE SERPL-MCNC: 1.06 NG/DL
TSH SERPL DL<=0.005 MIU/L-ACNC: 3.59 UIU/ML

## 2019-01-11 PROCEDURE — 84443 ASSAY THYROID STIM HORMONE: CPT

## 2019-01-11 PROCEDURE — 84439 ASSAY OF FREE THYROXINE: CPT

## 2019-01-11 PROCEDURE — 36415 COLL VENOUS BLD VENIPUNCTURE: CPT | Mod: PO

## 2019-01-30 RX ORDER — LEVOTHYROXINE SODIUM 50 UG/1
50 TABLET ORAL DAILY
Qty: 90 TABLET | Refills: 3 | OUTPATIENT
Start: 2019-01-30 | End: 2020-01-30

## 2019-09-03 DIAGNOSIS — E03.9 HYPOTHYROIDISM, UNSPECIFIED TYPE: Primary | ICD-10-CM

## 2019-09-16 ENCOUNTER — OFFICE VISIT (OUTPATIENT)
Dept: FAMILY MEDICINE | Facility: CLINIC | Age: 54
End: 2019-09-16
Payer: COMMERCIAL

## 2019-09-16 VITALS
RESPIRATION RATE: 18 BRPM | TEMPERATURE: 99 F | SYSTOLIC BLOOD PRESSURE: 140 MMHG | BODY MASS INDEX: 31.71 KG/M2 | WEIGHT: 179 LBS | OXYGEN SATURATION: 98 % | DIASTOLIC BLOOD PRESSURE: 80 MMHG | HEART RATE: 87 BPM | HEIGHT: 63 IN

## 2019-09-16 DIAGNOSIS — F33.1 MAJOR DEPRESSIVE DISORDER, RECURRENT EPISODE, MODERATE: ICD-10-CM

## 2019-09-16 DIAGNOSIS — Z12.31 ENCOUNTER FOR SCREENING MAMMOGRAM FOR MALIGNANT NEOPLASM OF BREAST: ICD-10-CM

## 2019-09-16 DIAGNOSIS — E78.5 HYPERLIPIDEMIA, UNSPECIFIED HYPERLIPIDEMIA TYPE: ICD-10-CM

## 2019-09-16 DIAGNOSIS — M54.16 LUMBAR RADICULAR PAIN: ICD-10-CM

## 2019-09-16 DIAGNOSIS — M47.816 OSTEOARTHRITIS OF CERVICAL AND LUMBAR SPINE: ICD-10-CM

## 2019-09-16 DIAGNOSIS — F41.0 PANIC ATTACK: ICD-10-CM

## 2019-09-16 DIAGNOSIS — Z00.00 WELL ADULT EXAM: Primary | ICD-10-CM

## 2019-09-16 DIAGNOSIS — F41.1 GAD (GENERALIZED ANXIETY DISORDER): ICD-10-CM

## 2019-09-16 DIAGNOSIS — Z23 NEED FOR IMMUNIZATION AGAINST INFLUENZA: ICD-10-CM

## 2019-09-16 DIAGNOSIS — Z12.11 SCREEN FOR COLON CANCER: ICD-10-CM

## 2019-09-16 DIAGNOSIS — F43.10 PTSD (POST-TRAUMATIC STRESS DISORDER): ICD-10-CM

## 2019-09-16 DIAGNOSIS — Z23 NEED FOR DIPHTHERIA-TETANUS-PERTUSSIS (TDAP) VACCINE: ICD-10-CM

## 2019-09-16 DIAGNOSIS — M47.812 OSTEOARTHRITIS OF CERVICAL AND LUMBAR SPINE: ICD-10-CM

## 2019-09-16 DIAGNOSIS — F31.81 BIPOLAR 2 DISORDER: ICD-10-CM

## 2019-09-16 DIAGNOSIS — E03.9 HYPOTHYROIDISM, UNSPECIFIED TYPE: ICD-10-CM

## 2019-09-16 DIAGNOSIS — I10 ESSENTIAL HYPERTENSION: ICD-10-CM

## 2019-09-16 DIAGNOSIS — G43.009 MIGRAINE WITHOUT AURA AND WITHOUT STATUS MIGRAINOSUS, NOT INTRACTABLE: ICD-10-CM

## 2019-09-16 DIAGNOSIS — F51.04 PSYCHOPHYSIOLOGICAL INSOMNIA: ICD-10-CM

## 2019-09-16 PROCEDURE — 99396 PREV VISIT EST AGE 40-64: CPT | Mod: 25,S$GLB,, | Performed by: INTERNAL MEDICINE

## 2019-09-16 PROCEDURE — 90686 IIV4 VACC NO PRSV 0.5 ML IM: CPT | Mod: S$GLB,,, | Performed by: INTERNAL MEDICINE

## 2019-09-16 PROCEDURE — 90686 FLU VACCINE (QUAD) GREATER THAN OR EQUAL TO 3YO PRESERVATIVE FREE IM: ICD-10-PCS | Mod: S$GLB,,, | Performed by: INTERNAL MEDICINE

## 2019-09-16 PROCEDURE — 99396 PR PREVENTIVE VISIT,EST,40-64: ICD-10-PCS | Mod: 25,S$GLB,, | Performed by: INTERNAL MEDICINE

## 2019-09-16 PROCEDURE — 90471 IMMUNIZATION ADMIN: CPT | Mod: S$GLB,,, | Performed by: INTERNAL MEDICINE

## 2019-09-16 PROCEDURE — 90472 TDAP VACCINE GREATER THAN OR EQUAL TO 7YO IM: ICD-10-PCS | Mod: S$GLB,,, | Performed by: INTERNAL MEDICINE

## 2019-09-16 PROCEDURE — 90715 TDAP VACCINE GREATER THAN OR EQUAL TO 7YO IM: ICD-10-PCS | Mod: S$GLB,,, | Performed by: INTERNAL MEDICINE

## 2019-09-16 PROCEDURE — 90471 FLU VACCINE (QUAD) GREATER THAN OR EQUAL TO 3YO PRESERVATIVE FREE IM: ICD-10-PCS | Mod: S$GLB,,, | Performed by: INTERNAL MEDICINE

## 2019-09-16 PROCEDURE — 90472 IMMUNIZATION ADMIN EACH ADD: CPT | Mod: S$GLB,,, | Performed by: INTERNAL MEDICINE

## 2019-09-16 PROCEDURE — 90715 TDAP VACCINE 7 YRS/> IM: CPT | Mod: S$GLB,,, | Performed by: INTERNAL MEDICINE

## 2019-09-16 RX ORDER — CLONAZEPAM 0.5 MG/1
1 TABLET ORAL DAILY
Refills: 2 | COMMUNITY
Start: 2019-08-05 | End: 2022-06-01

## 2019-09-16 RX ORDER — ATENOLOL 100 MG/1
100 TABLET ORAL DAILY
Qty: 90 TABLET | Refills: 3 | Status: SHIPPED | OUTPATIENT
Start: 2019-09-16 | End: 2020-05-29

## 2019-09-16 RX ORDER — MIRTAZAPINE 30 MG/1
30 TABLET, FILM COATED ORAL NIGHTLY
Refills: 3 | COMMUNITY
Start: 2019-07-31 | End: 2019-12-16

## 2019-09-16 RX ORDER — MELOXICAM 15 MG/1
15 TABLET ORAL DAILY
Qty: 90 TABLET | Refills: 3 | Status: SHIPPED | OUTPATIENT
Start: 2019-09-16 | End: 2020-09-07

## 2019-09-16 RX ORDER — FREMANEZUMAB-VFRM 225 MG/1.5ML
INJECTION SUBCUTANEOUS
Refills: 12 | COMMUNITY
Start: 2019-09-09 | End: 2020-03-18

## 2019-09-16 RX ORDER — VENLAFAXINE HYDROCHLORIDE 75 MG/1
225 CAPSULE, EXTENDED RELEASE ORAL DAILY
Refills: 2 | COMMUNITY
Start: 2019-06-27 | End: 2019-09-16 | Stop reason: ALTCHOICE

## 2019-09-16 RX ORDER — LEVOTHYROXINE SODIUM 50 UG/1
TABLET ORAL
Refills: 3 | COMMUNITY
Start: 2019-07-19 | End: 2019-09-20 | Stop reason: SDUPTHER

## 2019-09-16 RX ORDER — GABAPENTIN 300 MG/1
300 CAPSULE ORAL NIGHTLY
Refills: 3 | COMMUNITY
Start: 2019-06-19 | End: 2021-07-07

## 2019-09-16 RX ORDER — METHOCARBAMOL 750 MG/1
TABLET, FILM COATED ORAL
COMMUNITY
Start: 2019-09-13 | End: 2022-08-09 | Stop reason: SDUPTHER

## 2019-09-16 RX ORDER — VENLAFAXINE HYDROCHLORIDE 150 MG/1
450 CAPSULE, EXTENDED RELEASE ORAL DAILY
Refills: 3 | COMMUNITY
Start: 2019-07-31 | End: 2019-12-16

## 2019-09-16 NOTE — PROGRESS NOTES
Subjective:       Patient ID: Delaney Noel is a 54 y.o. female.    Medication List with Changes/Refills   New Medications    ATENOLOL (TENORMIN) 100 MG TABLET    Take 1 tablet (100 mg total) by mouth once daily.    MELOXICAM (MOBIC) 15 MG TABLET    Take 1 tablet (15 mg total) by mouth once daily.   Current Medications    AJOVY 225 MG/1.5 ML INJECTION    USE 1 INJECTION SUBCUTANEOUSLY ONCE A MONTH    ALPRAZOLAM (XANAX) 0.5 MG TABLET    nightly as needed.     CLONAZEPAM (KLONOPIN) 0.5 MG TABLET    Take 0.5 mg by mouth 3 (three) times daily as needed.    GABAPENTIN (NEURONTIN) 300 MG CAPSULE    Take 300 mg by mouth nightly.    IBUPROFEN (ADVIL,MOTRIN) 800 MG TABLET    Take 800 mg by mouth 3 (three) times daily as needed.     LEVOTHYROXINE (SYNTHROID) 50 MCG TABLET    TAKE 1 TABLET BY MOUTH DAILY FOR 3 DAYS THEN ON 4 TH DAY TAKE 1 AND 1/2 TABLET    METHOCARBAMOL (ROBAXIN) 750 MG TAB        MIRTAZAPINE (REMERON) 30 MG TABLET    Take 30 mg by mouth every evening.    ONZETRA XSAIL 11 MG AEPB    as needed.     ROSUVASTATIN (CRESTOR) 10 MG TABLET    Take 10 mg by mouth once daily.     SUMATRIPTAN (IMITREX) 100 MG TABLET    Take 100 mg by mouth every 2 (two) hours as needed.     TRAZODONE (DESYREL) 100 MG TABLET    Take 100 mg by mouth every evening.     VENLAFAXINE (EFFEXOR-XR) 150 MG CP24    Take 450 mg by mouth once daily.   Discontinued Medications    ATENOLOL (TENORMIN) 50 MG TABLET    TAKE 1 TABLET (50 MG TOTAL) BY MOUTH ONCE DAILY.    BUPROPION (WELLBUTRIN XL) 150 MG TB24 TABLET    Take 150 mg by mouth once daily.     BUPROPION (WELLBUTRIN XL) 300 MG 24 HR TABLET    Take 300 mg by mouth once daily.     HYDROCODONE-ACETAMINOPHEN 5-325MG (NORCO) 5-325 MG PER TABLET    Take 1 tablet by mouth every 4 (four) hours as needed for Pain.    LEVOTHYROXINE (SYNTHROID) 50 MCG TABLET    Take 1 tablet (50 mcg total) by mouth once daily.    MELOXICAM (MOBIC) 7.5 MG TABLET    Take 7.5 mg by mouth 2 (two) times daily.    VENLAFAXINE  (EFFEXOR-XR) 75 MG 24 HR CAPSULE    Take 225 mg by mouth once daily.       Chief Complaint: Establish Care (Phyiscal, Flu shot today); Annual Exam; and Medication Refill  She is a new patient here today to establish care.     She has hypertension and is taking atenolol 50 mg qday. She reports home BP readings with SBP in the 140s. She has no known CAD (atenolol originally chosen due to migraines). She does have intermittent chest pain and shortness of breath. She is followed regularly by Dr. Kim in cardiology and has a stress test and echo ordered. She is due to f/u next month.     She has hyperlipidemia and is taking crestor 10 mg qday. Her last lipids were on 10/2018 were 153/181/44/72.      She has hypothyroid and is taking levothyroxine 50 mcg once a day for 3 days and then on the fourth day will take 75 mcg. In the past she was followed by endocrinology but has not been seen in many years.  Her last TSH was normal on 1/2019. She did have a thyroid u/s on 11/2018 showing thyroiditis and unchanged from previous.     She has chronic migraines and is followed by neurology. She has had for many years.  She was having at least 4 migraines a week that would last 2 to 7 days at times.  She is sensitive to light with nausea, dizziness and confusion.  Migraines improved with rest and onzetra (long acting imitrex but too costly for regular use). She was started on ajovy injection monthly 3 months ago and has done well. She is having 3 to 6 headaches a month.  No side effects and tolerating well.      She has multiple psychiatric issues. She has major depression with suicidal ideations, anxiety with panic attack, bipolar type 2, PTSD with insomnia and OCD.  She was followed by Dr. Godfrey for years but he recent retired.  Prior to retiring he changed her medication (stopped seroquel and change wellbutrin 450 mg qday to venlafaxine 450 mg qday).  She continues on klonopin 0.5 mg qam, remeron 30 mg qhs and trazodone 100 mg  qhs PRN insomnia.  She does have xanax at home which she uses for panic attacks.  She is very unhappy with this regimen. She is less suicidal but still with fatalist thoughts and having rage issues/anger issues. She is out of work until she establishes with a new psychiatrist (which is happening in October). She is going to a clinical counselor weekly.  She does not feel her regimen controls her depression or anxiety.  She reports side effects from venlafaxine including sweating, weight gain and increased BP.      She has chronic low back pain since 10/2017. Around the time it was diagnosed she was having pain down her right leg and trouble walking.  She had an MRI of lumbar and cervical spine in 1/2019 showing mild spondylotic changes in L4-L5, L5-S1 and mild disease in cervical vertebra. She sent to PT which she feels helped with her symptoms. She was doing exercises at home for a while but has stopped. She was taking mobic 7.5 mg 2 pills qam which helped with her pain but she is now out of medication and gabapentin 300 mg qhs which helps with muscle spasms.  She reports pain worse with prolonged sitting or driving, and better with moving, home exercises, mobic and gabapentin.  Rated 1/10 to 10/10.  No weakness but her pain does radiate down her right leg.      She lives wit her  and feels safe at home. She denies any homicidal ideations or suicidal ideations today.  She does not exercise.  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     Colonoscopy---8/2014 repeat 5 years  Mammogram----12/2017 neg  Pap-----12/2017 neg HPV neg  Tdap---more than 10 years  Influenza vaccine---due   Pneumovax 23----none  Shingles vaccine-----none    Review of Systems   Constitutional: Positive for diaphoresis. Negative for appetite change, fatigue, fever and unexpected weight change.   HENT: Negative for congestion, ear pain, hearing loss, sore throat and trouble swallowing.    Eyes: Negative for pain and visual  "disturbance.   Respiratory: Positive for shortness of breath. Negative for cough, chest tightness and wheezing.    Cardiovascular: Positive for chest pain. Negative for palpitations and leg swelling.   Gastrointestinal: Negative for abdominal pain, blood in stool, constipation, diarrhea, nausea and vomiting.   Endocrine: Negative for polyuria.   Genitourinary: Negative for dysuria and hematuria.   Musculoskeletal: Positive for back pain. Negative for arthralgias and myalgias.   Skin: Negative for rash.   Neurological: Positive for headaches. Negative for dizziness, weakness and numbness.   Hematological: Does not bruise/bleed easily.   Psychiatric/Behavioral: Positive for behavioral problems, dysphoric mood and sleep disturbance. Negative for suicidal ideas. The patient is nervous/anxious.        Objective:      Vitals:    09/16/19 0833   BP: (!) 140/80   Pulse: 87   Resp: 18   Temp: 99 °F (37.2 °C)   TempSrc: Oral   SpO2: 98%   Weight: 81.2 kg (179 lb)   Height: 5' 3" (1.6 m)     Body mass index is 31.71 kg/m².  Physical Exam    General appearance: No acute distress, cooperative  Eyes: PERRL, EOMI, conjunctiva clear  Ears: normal external ear and pinna, tm clear without drainage, canals clear  Nose: Normal mucosa without drainage  Throat: no exudates or erythema, tonsils not enlarged  Mouth: no sores or lesions, moist mucous membranes  Neck: FROM, soft, supple, no thyromegaly, no bruits  Lymph: no anterior or posterior cervical adenopathy  Heart::  Regular rate and rhythm, no murmur  Lung: Clear to ascultation bilaterally, no wheezing, no rales, no rhonchi, no distress  Abdomen: Soft, nontender, no distention, no hepatosplenomegaly, bowel sounds normal, no guarding, no rebound, no peritoneal signs  Skin: no rashes, no lesions  Extremities: no edema, no cyanosis  Neuro: CN 2-12 intact, 5/5 muscle strength upper and lower extremity bilaterally, 2+ DTRs UE and LE bilaterally, normal gait  Peripheral pulses: 2+ pedal " pulses bilaterally, good perfusion and color  Musculoskeletal: FROM, good strenth, no tenderness  Joint: normal appearance, no swelling, no warmth, no deformity in all joints    Assessment:       1. Well adult exam    2. Essential hypertension    3. Hyperlipidemia, unspecified hyperlipidemia type    4. Hypothyroidism, unspecified type    5. Migraine without aura and without status migrainosus, not intractable    6. Major depressive disorder, recurrent episode, moderate    7. Bipolar 2 disorder    8. PTSD (post-traumatic stress disorder)    9. HUNTER (generalized anxiety disorder)    10. Panic attack    11. Psychophysiological insomnia    12. Osteoarthritis of cervical and lumbar spine    13. Lumbar radicular pain    14. Encounter for screening mammogram for malignant neoplasm of breast    15. Screen for colon cancer    16. Need for diphtheria-tetanus-pertussis (Tdap) vaccine    17. Need for immunization against influenza        Plan:       Well adult exam  Normal exam and due for labs, mammogram and colonoscopy.  She is due for Tdap and influenza today. At her f/u she needs pneumovax.   -     CBC auto differential; Future; Expected date: 09/16/2019  -     Comprehensive metabolic panel; Future; Expected date: 09/16/2019  -     Hemoglobin A1c; Future; Expected date: 09/16/2019  -     Lipid panel; Future; Expected date: 09/16/2019  -     TSH; Future; Expected date: 09/16/2019    Essential hypertension  Uncontrolled and will increase atenolol to 100 mg qday. Recheck BP with nurse in 2 weeks.   -     atenolol (TENORMIN) 100 MG tablet; Take 1 tablet (100 mg total) by mouth once daily.  Dispense: 90 tablet; Refill: 3    Hyperlipidemia, unspecified hyperlipidemia type  She is due to recheck lipids on crestorl     Hypothyroidism, unspecified type  Will check TSH on this regimen and adjust if needed.     Migraine without aura and without status migrainosus, not intractable  Good control since starting ajovy.     Major depressive  disorder, recurrent episode, moderate/Bipolar 2 disorder/PTSD (post-traumatic stress disorder)  Uncontrolled and she is due to establish with psychiatry in one month. She continues to follow with a clinical counselor weekly.  No change in medication at this time.     HUNTER (generalized anxiety disorder)/Panic attack  Uncontrolled but she continues on klonopin and has xanax at home from her previous psychiatrist.     Psychophysiological insomnia  Controlled on remeron and trazodone.     Osteoarthritis of cervical and lumbar spine with lumbar radicular pain  Uncontrolled and referral made to PT since she did well in the past.  Will restart mobic 15 mg once a day. She does not want to go to pain management.   -     meloxicam (MOBIC) 15 MG tablet; Take 1 tablet (15 mg total) by mouth once daily.  Dispense: 90 tablet; Refill: 3  -     Ambulatory Referral to Physical/Occupational Therapy    Encounter for screening mammogram for malignant neoplasm of breast  -     Mammo Digital Screening Bilat; Future; Expected date: 09/16/2019    Screen for colon cancer  -     Case request GI: COLONOSCOPY    Need for diphtheria-tetanus-pertussis (Tdap) vaccine  -     Tdap Vaccine    Need for immunization against influenza  -     Influenza - Quadrivalent (PF)    Follow up in about 3 months (around 12/16/2019) for chronic medical issues and 2 weeks with nurse for BP check .

## 2019-09-19 NOTE — TELEPHONE ENCOUNTER
----- Message from Jena Tamayo sent at 9/19/2019  8:34 AM CDT -----  Contact: pt [phone]  Patient called back she states her prescription for Lexapro was not called into [facility] on [address].

## 2019-09-20 RX ORDER — LEVOTHYROXINE SODIUM 50 UG/1
TABLET ORAL
Qty: 90 TABLET | Refills: 3 | Status: SHIPPED | OUTPATIENT
Start: 2019-09-20 | End: 2019-10-14

## 2019-09-20 NOTE — TELEPHONE ENCOUNTER
Spoke to patient regarding below message, verbalized understanding. She is requesting a refill of levothyroxine also. Please advise.

## 2019-10-04 ENCOUNTER — PATIENT MESSAGE (OUTPATIENT)
Dept: FAMILY MEDICINE | Facility: CLINIC | Age: 54
End: 2019-10-04

## 2019-10-12 ENCOUNTER — HOSPITAL ENCOUNTER (OUTPATIENT)
Dept: RADIOLOGY | Facility: HOSPITAL | Age: 54
Discharge: HOME | End: 2019-10-12
Attending: INTERNAL MEDICINE
Payer: COMMERCIAL

## 2019-10-12 VITALS — WEIGHT: 179 LBS | HEIGHT: 63 IN | BODY MASS INDEX: 31.71 KG/M2

## 2019-10-12 DIAGNOSIS — Z12.31 ENCOUNTER FOR SCREENING MAMMOGRAM FOR MALIGNANT NEOPLASM OF BREAST: ICD-10-CM

## 2019-10-12 PROCEDURE — 77067 SCR MAMMO BI INCL CAD: CPT | Mod: TC,PO

## 2019-10-12 PROCEDURE — 77063 BREAST TOMOSYNTHESIS BI: CPT | Mod: 26,,, | Performed by: RADIOLOGY

## 2019-10-12 PROCEDURE — 77067 SCR MAMMO BI INCL CAD: CPT | Mod: 26,,, | Performed by: RADIOLOGY

## 2019-10-12 PROCEDURE — 77067 MAMMO DIGITAL SCREENING BILAT WITH TOMOSYNTHESIS_CAD: ICD-10-PCS | Mod: 26,,, | Performed by: RADIOLOGY

## 2019-10-12 PROCEDURE — 77063 MAMMO DIGITAL SCREENING BILAT WITH TOMOSYNTHESIS_CAD: ICD-10-PCS | Mod: 26,,, | Performed by: RADIOLOGY

## 2019-10-14 ENCOUNTER — TELEPHONE (OUTPATIENT)
Dept: FAMILY MEDICINE | Facility: CLINIC | Age: 54
End: 2019-10-14

## 2019-10-14 DIAGNOSIS — R92.8 ABNORMAL MAMMOGRAM: Primary | ICD-10-CM

## 2019-10-14 DIAGNOSIS — E03.9 HYPOTHYROIDISM, UNSPECIFIED TYPE: ICD-10-CM

## 2019-10-14 RX ORDER — LEVOTHYROXINE SODIUM 50 UG/1
TABLET ORAL
Qty: 90 TABLET | Refills: 3 | Status: SHIPPED | OUTPATIENT
Start: 2019-10-14 | End: 2020-09-11

## 2019-10-14 RX ORDER — LEVOTHYROXINE SODIUM 75 UG/1
TABLET ORAL
Qty: 30 TABLET | Refills: 11 | Status: SHIPPED | OUTPATIENT
Start: 2019-10-14 | End: 2020-12-23

## 2019-10-14 NOTE — TELEPHONE ENCOUNTER
Spoke to patient regarding results, diagnostic mammo to right, change in thyroid medication, and lab recheck. Patient verbalized understanding, appts scheduled, patient aware.

## 2019-10-14 NOTE — TELEPHONE ENCOUNTER
Please let her know that radiology would like additional images to evaluate calcifications.     Please schedule diagnostic mammogram on right    Please let her know that her liver,kidney and blood counts were normal. Her cholesterol looks good.     Her thyroid is too slow so she need to take :  75 mcg three days a week ([id number]) and 50 mcg all other days.     Recheck TSH in 6 weeks (please schedule)    Thanks

## 2019-10-15 ENCOUNTER — TELEPHONE (OUTPATIENT)
Dept: RADIOLOGY | Facility: HOSPITAL | Age: 54
End: 2019-10-15

## 2019-10-18 ENCOUNTER — TELEPHONE (OUTPATIENT)
Dept: FAMILY MEDICINE | Facility: CLINIC | Age: 54
End: 2019-10-18

## 2019-10-18 ENCOUNTER — HOSPITAL ENCOUNTER (OUTPATIENT)
Dept: RADIOLOGY | Facility: HOSPITAL | Age: 54
Discharge: HOME | End: 2019-10-18
Attending: INTERNAL MEDICINE
Payer: COMMERCIAL

## 2019-10-18 DIAGNOSIS — R92.8 ABNORMAL MAMMOGRAM: Primary | ICD-10-CM

## 2019-10-18 DIAGNOSIS — R92.8 ABNORMAL MAMMOGRAM: ICD-10-CM

## 2019-10-18 PROCEDURE — 77065 MAMMO DIGITAL DIAGNOSTIC RIGHT WITH TOMOSYNTHESIS_CAD: ICD-10-PCS | Mod: 26,,, | Performed by: RADIOLOGY

## 2019-10-18 PROCEDURE — 77061 BREAST TOMOSYNTHESIS UNI: CPT | Mod: TC,PO

## 2019-10-18 PROCEDURE — 77065 DX MAMMO INCL CAD UNI: CPT | Mod: 26,,, | Performed by: RADIOLOGY

## 2019-10-18 PROCEDURE — 77061 BREAST TOMOSYNTHESIS UNI: CPT | Mod: 26,,, | Performed by: RADIOLOGY

## 2019-10-18 PROCEDURE — 77065 DX MAMMO INCL CAD UNI: CPT | Mod: TC,PO

## 2019-10-18 PROCEDURE — 77061 MAMMO DIGITAL DIAGNOSTIC RIGHT WITH TOMOSYNTHESIS_CAD: ICD-10-PCS | Mod: 26,,, | Performed by: RADIOLOGY

## 2019-10-18 NOTE — TELEPHONE ENCOUNTER
Please let her know that the diagnostic mammogram was reassuring but radiology recommended a shortened interval to recheck.     Please schedule diagnostic mammogram and u/s in 6 months on the right.     Thanks

## 2019-12-02 ENCOUNTER — PATIENT OUTREACH (OUTPATIENT)
Dept: ADMINISTRATIVE | Facility: HOSPITAL | Age: 54
End: 2019-12-02

## 2019-12-16 ENCOUNTER — OFFICE VISIT (OUTPATIENT)
Dept: FAMILY MEDICINE | Facility: CLINIC | Age: 54
End: 2019-12-16
Payer: COMMERCIAL

## 2019-12-16 ENCOUNTER — PATIENT MESSAGE (OUTPATIENT)
Dept: FAMILY MEDICINE | Facility: CLINIC | Age: 54
End: 2019-12-16

## 2019-12-16 ENCOUNTER — HOSPITAL ENCOUNTER (OUTPATIENT)
Dept: RADIOLOGY | Facility: HOSPITAL | Age: 54
Discharge: HOME | End: 2019-12-16
Attending: INTERNAL MEDICINE
Payer: COMMERCIAL

## 2019-12-16 VITALS
WEIGHT: 180.75 LBS | HEIGHT: 63 IN | HEART RATE: 70 BPM | DIASTOLIC BLOOD PRESSURE: 84 MMHG | OXYGEN SATURATION: 97 % | SYSTOLIC BLOOD PRESSURE: 132 MMHG | BODY MASS INDEX: 32.03 KG/M2 | TEMPERATURE: 99 F

## 2019-12-16 DIAGNOSIS — G43.009 MIGRAINE WITHOUT AURA AND WITHOUT STATUS MIGRAINOSUS, NOT INTRACTABLE: ICD-10-CM

## 2019-12-16 DIAGNOSIS — R06.02 SHORTNESS OF BREATH: ICD-10-CM

## 2019-12-16 DIAGNOSIS — R91.1 LUNG NODULE: ICD-10-CM

## 2019-12-16 DIAGNOSIS — M47.816 OSTEOARTHRITIS OF CERVICAL AND LUMBAR SPINE: ICD-10-CM

## 2019-12-16 DIAGNOSIS — F33.1 MAJOR DEPRESSIVE DISORDER, RECURRENT EPISODE, MODERATE: ICD-10-CM

## 2019-12-16 DIAGNOSIS — F31.81 BIPOLAR 2 DISORDER: ICD-10-CM

## 2019-12-16 DIAGNOSIS — E78.5 HYPERLIPIDEMIA, UNSPECIFIED HYPERLIPIDEMIA TYPE: ICD-10-CM

## 2019-12-16 DIAGNOSIS — F51.04 PSYCHOPHYSIOLOGICAL INSOMNIA: ICD-10-CM

## 2019-12-16 DIAGNOSIS — I10 ESSENTIAL HYPERTENSION: Primary | ICD-10-CM

## 2019-12-16 DIAGNOSIS — E03.9 HYPOTHYROIDISM, UNSPECIFIED TYPE: ICD-10-CM

## 2019-12-16 DIAGNOSIS — M54.16 LUMBAR RADICULAR PAIN: ICD-10-CM

## 2019-12-16 DIAGNOSIS — Z12.11 SCREEN FOR COLON CANCER: ICD-10-CM

## 2019-12-16 DIAGNOSIS — M47.812 OSTEOARTHRITIS OF CERVICAL AND LUMBAR SPINE: ICD-10-CM

## 2019-12-16 DIAGNOSIS — F41.0 PANIC ATTACK: ICD-10-CM

## 2019-12-16 DIAGNOSIS — F43.10 PTSD (POST-TRAUMATIC STRESS DISORDER): ICD-10-CM

## 2019-12-16 DIAGNOSIS — F41.1 GAD (GENERALIZED ANXIETY DISORDER): ICD-10-CM

## 2019-12-16 PROCEDURE — 71260 CT THORAX DX C+: CPT | Mod: TC,PO

## 2019-12-16 PROCEDURE — 25500020 PHARM REV CODE 255: Mod: PO | Performed by: INTERNAL MEDICINE

## 2019-12-16 PROCEDURE — 99214 PR OFFICE/OUTPT VISIT, EST, LEVL IV, 30-39 MIN: ICD-10-PCS | Mod: S$GLB,,, | Performed by: INTERNAL MEDICINE

## 2019-12-16 PROCEDURE — 71260 CT CHEST WITH CONTRAST: ICD-10-PCS | Mod: 26,,, | Performed by: RADIOLOGY

## 2019-12-16 PROCEDURE — 71260 CT THORAX DX C+: CPT | Mod: 26,,, | Performed by: RADIOLOGY

## 2019-12-16 PROCEDURE — 84443 ASSAY THYROID STIM HORMONE: CPT

## 2019-12-16 PROCEDURE — 99214 OFFICE O/P EST MOD 30 MIN: CPT | Mod: S$GLB,,, | Performed by: INTERNAL MEDICINE

## 2019-12-16 RX ORDER — QUETIAPINE FUMARATE 100 MG/1
50 TABLET, FILM COATED ORAL DAILY
COMMUNITY
End: 2022-04-07

## 2019-12-16 RX ORDER — ALBUTEROL SULFATE 90 UG/1
2 AEROSOL, METERED RESPIRATORY (INHALATION) EVERY 6 HOURS PRN
Qty: 18 G | Refills: 2 | Status: SHIPPED | OUTPATIENT
Start: 2019-12-16 | End: 2020-03-11

## 2019-12-16 RX ADMIN — IOHEXOL 75 ML: 350 INJECTION, SOLUTION INTRAVENOUS at 02:12

## 2019-12-16 NOTE — PROGRESS NOTES
Subjective:       Patient ID: Delaney Noel is a 54 y.o. female.    Medication List with Changes/Refills   New Medications    ALBUTEROL (VENTOLIN HFA) 90 MCG/ACTUATION INHALER    Inhale 2 puffs into the lungs every 6 (six) hours as needed for Wheezing. Rescue   Current Medications    AJOVY 225 MG/1.5 ML INJECTION    USE 1 INJECTION SUBCUTANEOUSLY ONCE A MONTH    ALPRAZOLAM (XANAX) 0.5 MG TABLET    nightly as needed.     ATENOLOL (TENORMIN) 100 MG TABLET    Take 1 tablet (100 mg total) by mouth once daily.    CLONAZEPAM (KLONOPIN) 0.5 MG TABLET    Take 0.5 mg by mouth once daily.    GABAPENTIN (NEURONTIN) 300 MG CAPSULE    Take 300 mg by mouth nightly.    IBUPROFEN (ADVIL,MOTRIN) 800 MG TABLET    Take 800 mg by mouth 3 (three) times daily as needed.     LEVOTHYROXINE (SYNTHROID) 50 MCG TABLET    Take levothyroxine 50 mcg on Tues-Thurs-SAt and Sun    LEVOTHYROXINE (SYNTHROID) 75 MCG TABLET    Take 1 tablet on [id number].    MELOXICAM (MOBIC) 15 MG TABLET    Take 1 tablet (15 mg total) by mouth once daily.    METHOCARBAMOL (ROBAXIN) 750 MG TAB        ONZETRA XSAIL 11 MG AEPB    as needed.     QUETIAPINE (SEROQUEL) 100 MG TAB    Take 100 mg by mouth once daily.    ROSUVASTATIN (CRESTOR) 10 MG TABLET    Take 10 mg by mouth once daily.     SUMATRIPTAN (IMITREX) 100 MG TABLET    Take 100 mg by mouth every 2 (two) hours as needed.    Discontinued Medications    MIRTAZAPINE (REMERON) 30 MG TABLET    Take 30 mg by mouth every evening.    TRAZODONE (DESYREL) 100 MG TABLET    Take 100 mg by mouth every evening.     VENLAFAXINE (EFFEXOR-XR) 150 MG CP24    Take 450 mg by mouth once daily.       Chief Complaint: Follow-up  She is here today to f/u on chronic medical issues     She has hypertension and is taking atenolol 100 mg qday. She BP at home this week has been good. She has no known CAD (atenolol originally chosen due to migraines). She denies chest pain but does have shortness of breath with exertion. She does feel  that she wheezes and this improves with rest after about 10 minutes. She was seen by Dr. Kim in cardiology and is scheduled for a stress test and echo in January.      She has hyperlipidemia and is taking crestor 10 mg qday. Her last lipids were on 10/2019 were 136/162/37/66.         She has hypothyroid and is taking levothyroxine 50 mcg qday on Tues-Thurs-Sat and Sun and 75 mcg qday on Mon-Wed-Fri.  This was adjusted for TSH of 5.8 on 10/2019. She does report when hospitalized she was told her TSH was 11.  She did have a thyroid u/s on 11/2018 showing thyroiditis and unchanged from previous.     She has a small 4 mm lung nodule seen on CT on 7/2014 that she has not had rechecked.      She has chronic migraines and is followed by neurology. She has had for many years.  She was having at least 4 migraines a week that would last 2 to 7 days at times.  She is sensitive to light with nausea, dizziness and confusion.  Migraines improved with rest and onzetra (long acting imitrex but too costly for regular use). She was started on ajovy injection and has done well. She is having 3 to 6 headaches a month.  No side effects and tolerating well.  Since discharge from the psychiatric hospital she has had increased headaches (about 6 this last week).       She has multiple psychiatric issues. She has major depression with suicidal ideations (recent hospitalization for suicidal thoughts on 12/3/2019), anxiety with panic attack, bipolar type 2, PTSD with insomnia and OCD.  On 12/3/2019 she had suicidal ideations and was sent to [place] inpatient psychiatric unit. Her medications were changed and she is now taking seroquel 100 mg qday with klonopin 0.5 mg qam. She has xanax that she uses PRN for anxiety attacks. She feels this is helping her symptoms and denies any suicidal ideations at this time. She does continue with crying spells all day and has been unable to work. She is to start IOP three times a week this week. She is  hoping psychiatry adjusts her dose of seroquel. She feels her anxiety is better and has not had a panic attack.     She has chronic low back pain since 10/2017. Around the time it was diagnosed she was having pain down her right leg and trouble walking.  She had an MRI of lumbar and cervical spine in 1/2019 showing mild spondylotic changes in L4-L5, L5-S1 and mild disease in cervical vertebra. She sent to PT which she feels helped with her symptoms. She was doing exercises at home for a while but has stopped. She was taking mobic 15 mg qam which helped and gabapentin 300 mg qhs which helps with muscle spasms.  She reports pain worse with prolonged sitting or driving, and better with moving, home exercises, mobic and gabapentin.  Rated 1/10 to 10/10.  No weakness but her pain does radiate down her right leg.  She does report increased pain since her hospitalization due to prolonged sitting.      She lives wit her  and feels safe at home. She denies any homicidal ideations or suicidal ideations today.  She does not exercise.  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      Colonoscopy---8/2014 repeat 5 years---due  Mammogram----110/2019 neg  Pap-----12/2017 neg HPV neg  Tdap---9/2019  Influenza vaccine-----9/2019   Pneumovax 23----none  Shingles vaccine-----none    Review of Systems   Constitutional: Negative for appetite change, fatigue, fever and unexpected weight change.   HENT: Negative for congestion, ear pain, hearing loss, sore throat and trouble swallowing.    Eyes: Negative for pain and visual disturbance.   Respiratory: Positive for cough and wheezing. Negative for chest tightness and shortness of breath.    Cardiovascular: Negative for chest pain, palpitations and leg swelling.   Gastrointestinal: Positive for blood in stool (hemorrhoid) and diarrhea. Negative for abdominal pain,  "constipation, nausea and vomiting.   Endocrine: Negative for polyuria.   Genitourinary: Negative for dysuria and hematuria.   Musculoskeletal: Positive for back pain. Negative for arthralgias and myalgias.   Skin: Negative for rash.   Neurological: Positive for headaches. Negative for dizziness, weakness and numbness.   Hematological: Does not bruise/bleed easily.   Psychiatric/Behavioral: Positive for dysphoric mood and sleep disturbance. Negative for suicidal ideas. The patient is nervous/anxious.        Objective:      Vitals:    12/16/19 1012   BP: 132/84   Pulse: 70   Temp: 99.2 °F (37.3 °C)   TempSrc: Oral   SpO2: 97%   Weight: 82 kg (180 lb 12.4 oz)   Height: 5' 3" (1.6 m)     Body mass index is 32.02 kg/m².  Physical Exam    General appearance: No acute distress, cooperative  Eyes: PERRL, EOMI, conjunctiva clear  HEENT: ears normal, nose without rhinorrhea, normal turbinates,mouth no sores or lesions, throat no erythema or pus  Neck: FROM, soft, supple, no thyromegaly, no bruits  Lymph: no anterior or posterior cervical adenopathy  Heart::  Regular rate and rhythm, no murmur  Lung: Clear to ascultation bilaterally, no wheezing, no rales, no rhonchi, no distress  Abdomen: Soft, nontender, no distention, no hepatosplenomegaly, bowel sounds normal, no guarding, no rebound, no peritoneal signs  Skin: no rashes, no lesions  Extremities: no edema, no cyanosis  Neuro: no focal abnormalities, strength 5/5 b/l UE and LE, 2+ DTRs b/l UE and LE, normal gait  Peripheral pulses: 2+ pedal pulses bilaterally, good perfusion and color  Musculoskeletal: FROM, good strenth, no tenderness  Joint: normal appearance, no swelling, no warmth, no deformity in all joints    Assessment:       1. Essential hypertension    2. Hyperlipidemia, unspecified hyperlipidemia type    3. Shortness of breath    4. Lung nodule    5. Hypothyroidism, unspecified type    6. Migraine without aura and without status migrainosus, not intractable    7. " Major depressive disorder, recurrent episode, moderate    8. Bipolar 2 disorder    9. PTSD (post-traumatic stress disorder)    10. HUNTER (generalized anxiety disorder)    11. Panic attack    12. Psychophysiological insomnia    13. Osteoarthritis of cervical and lumbar spine    14. Lumbar radicular pain    15. Screen for colon cancer        Plan:       Essential hypertension  Good control on this regimen.     Hyperlipidemia, unspecified hyperlipidemia type  Good control on crestor.     Shortness of breath  Question if cardiac vs pulmonary. She is having full cardiac workup in January. Will give her a trial of albuterol when she has symptoms. If she responses then consider PFTs.    -     CT Chest With Contrast; Future; Expected date: 12/16/2019  -     albuterol (VENTOLIN HFA) 90 mcg/actuation inhaler; Inhale 2 puffs into the lungs every 6 (six) hours as needed for Wheezing. Rescue  Dispense: 18 g; Refill: 2    Lung nodule  Will do a CT chest for surveillance.   -     CT Chest With Contrast; Future; Expected date: 12/16/2019    Hypothyroidism, unspecified type  Noted to be slow and her levothyroxine dose was adjusted. She is due to recheck TSH (50 mcg T-Th-Sat-Sun and 75 mcg M-W-F).    -     TSH    Migraine without aura and without status migrainosus, not intractable  Improved on current regimen.     Major depressive disorder, recurrent episode, moderate/Bipolar 2 disorder/PTSD (post-traumatic stress disorder)  Improved since d/c from hospital on seroquel. She still is not controlled but improved. She is to start IOP and her medications will be managed. No suicidal ideations at this time.     HUNTER (generalized anxiety disorder) with Panic attack  Improved on this regimen.     Psychophysiological insomnia  Improved on klonopin and seroquel.     Osteoarthritis of cervical and lumbar spine with Lumbar radicular pain  Symptoms increased after hospitalization due to inactivity. She is starting her home exercise program and  continue her current regimen.      Screen for colon cancer  -     Fecal Immunochemical Test (iFOBT); Future; Expected date: 12/16/2019  -     Case request GI: COLONOSCOPY    Follow up in about 3 months (around 3/16/2020) for chronic medical issues/shortness of breath.

## 2019-12-17 ENCOUNTER — PATIENT MESSAGE (OUTPATIENT)
Dept: FAMILY MEDICINE | Facility: CLINIC | Age: 54
End: 2019-12-17

## 2019-12-17 LAB — TSH SERPL DL<=0.005 MIU/L-ACNC: 2.54 UIU/ML (ref 0.4–4)

## 2019-12-30 ENCOUNTER — TELEPHONE (OUTPATIENT)
Dept: GASTROENTEROLOGY | Facility: CLINIC | Age: 54
End: 2019-12-30

## 2020-01-07 ENCOUNTER — PATIENT MESSAGE (OUTPATIENT)
Dept: GASTROENTEROLOGY | Facility: CLINIC | Age: 55
End: 2020-01-07

## 2020-03-04 ENCOUNTER — TELEPHONE (OUTPATIENT)
Dept: GASTROENTEROLOGY | Facility: CLINIC | Age: 55
End: 2020-03-04

## 2020-03-04 NOTE — TELEPHONE ENCOUNTER
Pt stated that she has a new insurance but is on a cruise and will bring it when she gets back to town. R/s colonoscopy, mailed confirmation and instructions to address on file. Pt verbalized understanding.

## 2020-03-11 DIAGNOSIS — R06.02 SHORTNESS OF BREATH: ICD-10-CM

## 2020-03-11 RX ORDER — ALBUTEROL SULFATE 90 UG/1
AEROSOL, METERED RESPIRATORY (INHALATION)
Qty: 18 G | Refills: 2 | Status: SHIPPED | OUTPATIENT
Start: 2020-03-11 | End: 2021-02-07

## 2020-03-16 ENCOUNTER — PATIENT MESSAGE (OUTPATIENT)
Dept: FAMILY MEDICINE | Facility: CLINIC | Age: 55
End: 2020-03-16

## 2020-03-16 ENCOUNTER — TELEPHONE (OUTPATIENT)
Dept: FAMILY MEDICINE | Facility: CLINIC | Age: 55
End: 2020-03-16

## 2020-03-17 ENCOUNTER — PATIENT OUTREACH (OUTPATIENT)
Dept: ADMINISTRATIVE | Facility: OTHER | Age: 55
End: 2020-03-17

## 2020-03-17 NOTE — PROGRESS NOTES
Chart reviewed.   Requested updates from Care Everywhere.  Immunizations reconciled.   HM updated.  Updated patient history.

## 2020-03-18 ENCOUNTER — OFFICE VISIT (OUTPATIENT)
Dept: FAMILY MEDICINE | Facility: CLINIC | Age: 55
End: 2020-03-18
Payer: COMMERCIAL

## 2020-03-18 VITALS — DIASTOLIC BLOOD PRESSURE: 83 MMHG | SYSTOLIC BLOOD PRESSURE: 123 MMHG

## 2020-03-18 DIAGNOSIS — G43.009 MIGRAINE WITHOUT AURA AND WITHOUT STATUS MIGRAINOSUS, NOT INTRACTABLE: ICD-10-CM

## 2020-03-18 DIAGNOSIS — F33.1 MAJOR DEPRESSIVE DISORDER, RECURRENT EPISODE, MODERATE: ICD-10-CM

## 2020-03-18 DIAGNOSIS — E78.5 HYPERLIPIDEMIA, UNSPECIFIED HYPERLIPIDEMIA TYPE: ICD-10-CM

## 2020-03-18 DIAGNOSIS — R91.1 LUNG NODULE: ICD-10-CM

## 2020-03-18 DIAGNOSIS — R94.39 POSITIVE CARDIAC STRESS TEST: ICD-10-CM

## 2020-03-18 DIAGNOSIS — I10 ESSENTIAL HYPERTENSION: Primary | ICD-10-CM

## 2020-03-18 DIAGNOSIS — M54.16 LUMBAR RADICULAR PAIN: ICD-10-CM

## 2020-03-18 DIAGNOSIS — E03.9 HYPOTHYROIDISM, UNSPECIFIED TYPE: ICD-10-CM

## 2020-03-18 DIAGNOSIS — F41.0 PANIC ATTACK: ICD-10-CM

## 2020-03-18 DIAGNOSIS — F31.81 BIPOLAR 2 DISORDER: ICD-10-CM

## 2020-03-18 DIAGNOSIS — F43.10 PTSD (POST-TRAUMATIC STRESS DISORDER): ICD-10-CM

## 2020-03-18 DIAGNOSIS — Z12.31 ENCOUNTER FOR SCREENING MAMMOGRAM FOR MALIGNANT NEOPLASM OF BREAST: ICD-10-CM

## 2020-03-18 DIAGNOSIS — F41.1 GAD (GENERALIZED ANXIETY DISORDER): ICD-10-CM

## 2020-03-18 DIAGNOSIS — M47.816 OSTEOARTHRITIS OF CERVICAL AND LUMBAR SPINE: ICD-10-CM

## 2020-03-18 DIAGNOSIS — Z11.4 SCREENING FOR HIV (HUMAN IMMUNODEFICIENCY VIRUS): ICD-10-CM

## 2020-03-18 DIAGNOSIS — F51.04 PSYCHOPHYSIOLOGICAL INSOMNIA: ICD-10-CM

## 2020-03-18 DIAGNOSIS — M47.812 OSTEOARTHRITIS OF CERVICAL AND LUMBAR SPINE: ICD-10-CM

## 2020-03-18 DIAGNOSIS — R73.09 ELEVATED GLUCOSE: ICD-10-CM

## 2020-03-18 PROCEDURE — 99214 PR OFFICE/OUTPT VISIT, EST, LEVL IV, 30-39 MIN: ICD-10-PCS | Mod: 95,,, | Performed by: INTERNAL MEDICINE

## 2020-03-18 PROCEDURE — 99214 OFFICE O/P EST MOD 30 MIN: CPT | Mod: 95,,, | Performed by: INTERNAL MEDICINE

## 2020-03-18 RX ORDER — LAMOTRIGINE 100 MG/1
TABLET ORAL
COMMUNITY
Start: 2020-03-16 | End: 2020-03-18

## 2020-03-18 RX ORDER — CARIPRAZINE 1.5 MG/1
CAPSULE, GELATIN COATED ORAL
COMMUNITY
Start: 2020-03-07 | End: 2020-03-18

## 2020-03-18 RX ORDER — ESCITALOPRAM OXALATE 10 MG/1
TABLET ORAL
COMMUNITY
Start: 2020-01-08 | End: 2020-05-29

## 2020-03-18 RX ORDER — TRAZODONE HYDROCHLORIDE 50 MG/1
TABLET ORAL
COMMUNITY
Start: 2020-02-21 | End: 2020-03-18

## 2020-03-18 RX ORDER — ESCITALOPRAM OXALATE 20 MG/1
30 TABLET ORAL DAILY
COMMUNITY
Start: 2020-02-04 | End: 2022-05-31 | Stop reason: SDUPTHER

## 2020-03-18 NOTE — PROGRESS NOTES
Subjective:       Patient ID: Delaney Noel is a 55 y.o. female.    Medication List with Changes/Refills   Current Medications    ALBUTEROL (PROVENTIL/VENTOLIN HFA) 90 MCG/ACTUATION INHALER    INHALE 2 PUFFS INTO THE LUNGS EVERY 6 (SIX) HOURS AS NEEDED FOR WHEEZING. RESCUE    ATENOLOL (TENORMIN) 100 MG TABLET    Take 1 tablet (100 mg total) by mouth once daily.    CLONAZEPAM (KLONOPIN) 0.5 MG TABLET    Take 0.5 mg by mouth once daily.    ESCITALOPRAM OXALATE (LEXAPRO) 10 MG TABLET        ESCITALOPRAM OXALATE (LEXAPRO) 20 MG TABLET        GABAPENTIN (NEURONTIN) 300 MG CAPSULE    Take 300 mg by mouth nightly.    GALCANEZUMAB-GNLM (EMGALITY PEN SUBQ)    Inject 1 Dose into the skin every 30 days.    IBUPROFEN (ADVIL,MOTRIN) 800 MG TABLET    Take 800 mg by mouth 3 (three) times daily as needed.     LEVOTHYROXINE (SYNTHROID) 50 MCG TABLET    Take levothyroxine 50 mcg on Tues-Thurs-SAt and Sun    LEVOTHYROXINE (SYNTHROID) 75 MCG TABLET    Take 1 tablet on [employer].    MELOXICAM (MOBIC) 15 MG TABLET    Take 1 tablet (15 mg total) by mouth once daily.    METHOCARBAMOL (ROBAXIN) 750 MG TAB        ONZETRA XSAIL 11 MG AEPB    as needed.     QUETIAPINE (SEROQUEL) 100 MG TAB    Take 100 mg by mouth once daily.    ROSUVASTATIN (CRESTOR) 10 MG TABLET    Take 10 mg by mouth once daily.     SUMATRIPTAN (IMITREX) 100 MG TABLET    Take 100 mg by mouth every 2 (two) hours as needed.    Discontinued Medications    AJOVY 225 MG/1.5 ML INJECTION    USE 1 INJECTION SUBCUTANEOUSLY ONCE A MONTH    ALPRAZOLAM (XANAX) 0.5 MG TABLET    nightly as needed.     LAMOTRIGINE (LAMICTAL) 100 MG TABLET        TRAZODONE (DESYREL) 50 MG TABLET        VRAYLAR 1.5 MG CAP           Chief Complaint: chronic medical follow up.     The patient location is: car  The chief complaint leading to consultation is: follow up visit  Visit type: Virtual visit with synchronous audio and video  Total time spent with patient: 30 minutes  Each patient to whom he or  she provides medical services by telemedicine is:  (1) informed of the relationship between the physician and patient and the respective role of any other health care provider with respect to management of the patient; and (2) notified that he or she may decline to receive medical services by telemedicine and may withdraw from such care at any time.    She is here today to f/u on chronic medical issues      She has hypertension and is taking atenolol 100 mg qday. She reports her home BP are good. She was having shortness of breath and had cardiac workup with Dr. Kim.  She reports her cardiac stress test was positive and she is scheduled for a cardiac cath in one week.        She has hyperlipidemia and is taking crestor 10 mg qday. Her last lipids were on 10/2019 were 136/162/37/66.         She has hypothyroid and is taking levothyroxine 50 mcg qday on Tues-Thurs-Sat and Sun and 75 mcg qday on Mon-Wed-Fri.  Her TSH was normal on 12/2019.   She did have a thyroid u/s on 11/2018 showing thyroiditis and unchanged from previous.      She has a small 4 mm lung nodule seen on CT on 7/2014. Repeat CT on 12/2019 showed on changes.      She has chronic migraines and is followed by neurology. She has had for many years.  She was having at least 4 migraines a week that would last 2 to 7 days at times.  She is sensitive to light with nausea, dizziness and confusion.  Migraines improved with rest and onzetra (long acting imitrex but too costly for regular use). She was started on ajovy injection and was headache free. Due to insurance change she is now taking emgality once a month. She feels it is workign well. She was seen by neurology yesterday.      She has multiple psychiatric issues. She has major depression with suicidal ideations (recent hospitalization for suicidal thoughts on 12/3/2019), anxiety with panic attack, bipolar type 2, PTSD with insomnia and OCD.  She continues to follow with psychiatry and was seen  yesterday.  Her klonopin was increased to 0.5 mg bid and she continues on seroquel 100 mg qday. She feels this is helping her symptoms and denies any suicidal ideations at this time. She started IOP three times a week. She feels her anxiety is better and has not had a panic attack. She has not been able to work due to depression/anxeity. Her raylar was stopped due to severe lower leg pain.       She has chronic low back pain since 10/2017. Around the time it was diagnosed she was having pain down her right leg and trouble walking.  She had an MRI of lumbar and cervical spine in 1/2019 showing mild spondylotic changes in L4-L5, L5-S1 and mild disease in cervical vertebra. She sent to PT which she feels helped with her symptoms. She is taking mobic 15 mg qam which helped and gabapentin 300 mg qhs which helps with muscle spasms.  She reports pain worse with prolonged sitting or driving, and better with moving, home exercises, mobic and gabapentin.  Rated 1/10 to 10/10.  No weakness but her pain does radiate down her right leg.  She does reports her pain is at baseline.      She lives with her  and feels safe at home. She denies any homicidal ideations or suicidal ideations today.  She does not exercise.  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  She has not returned to work.      Colonoscopy---8/2014 repeat 5 years---due (scheduled for April)  Mammogram----10/2019 neg  Pap-----12/2017 neg HPV neg  Tdap---9/2019  Influenza vaccine-----9/2019   Pneumovax 23----none  Shingles vaccine-----none    Review of Systems   Constitutional: Positive for activity change and unexpected weight change. Negative for appetite change, fatigue and fever.   HENT: Negative for congestion, ear pain, hearing loss, rhinorrhea, sore throat and trouble swallowing.    Eyes: Negative for pain, discharge and visual  disturbance.   Respiratory: Positive for chest tightness and wheezing. Negative for cough and shortness of breath.    Cardiovascular: Positive for palpitations. Negative for chest pain and leg swelling.   Gastrointestinal: Positive for blood in stool and diarrhea. Negative for abdominal pain, constipation, nausea and vomiting.   Endocrine: Positive for polyuria. Negative for polydipsia.   Genitourinary: Positive for difficulty urinating. Negative for dysuria, hematuria and menstrual problem.   Musculoskeletal: Positive for arthralgias, joint swelling and neck pain. Negative for back pain and myalgias.   Skin: Negative for rash.   Neurological: Positive for weakness and headaches. Negative for dizziness and numbness.   Hematological: Does not bruise/bleed easily.   Psychiatric/Behavioral: Positive for confusion. Negative for dysphoric mood, sleep disturbance and suicidal ideas. The patient is not nervous/anxious.        Objective:      Vitals:    03/18/20 1103   BP: 123/83     There is no height or weight on file to calculate BMI.  Physical Exam      Assessment:       1. Essential hypertension    2. Hyperlipidemia, unspecified hyperlipidemia type    3. Positive cardiac stress test    4. Lung nodule    5. Hypothyroidism, unspecified type    6. Migraine without aura and without status migrainosus, not intractable    7. Major depressive disorder, recurrent episode, moderate    8. Bipolar 2 disorder    9. PTSD (post-traumatic stress disorder)    10. HUNTER (generalized anxiety disorder)    11. Psychophysiological insomnia    12. Panic attack    13. Osteoarthritis of cervical and lumbar spine    14. Lumbar radicular pain    15. Elevated glucose    16. Encounter for screening mammogram for malignant neoplasm of breast    17. Screening for HIV (human immunodeficiency virus)        Plan:       Essential hypertension  Well controlled and continue current regimen. She is due for labs prior to her next appt.   -     CBC auto  differential; Future; Expected date: 09/01/2020  -     Comprehensive metabolic panel; Future; Expected date: 09/01/2020  -     Lipid panel; Future; Expected date: 09/01/2020  -     TSH; Future; Expected date: 09/01/2020    Hyperlipidemia, unspecified hyperlipidemia type  Good control on crestor.     Positive cardiac stress test  She is scheduled for a cardic cath next week.     Lung nodule  Stable and no further surveillance is needed.     Hypothyroidism, unspecified type  Good control on this regimen of levothyroxine.     Migraine without aura and without status migrainosus, not intractable  Improved but just changed ajovy to emgality.  Continue to follow with neurology.     Major depressive disorder, recurrent episode, moderate\Bipolar 2 disorder\PTSD (post-traumatic stress disorder)  Controlled and followed by psychiatry.     HUNTER (generalized anxiety disorder) withPanic attack   She is doing well on this regimen.     Psychophysiological insomnia  Controlled on seroquel.     Osteoarthritis of cervical and lumbar spine  Stable and denied any increased pain today.     Lumbar radicular pain  Doing well on this regimen.     Elevated glucose  -     Hemoglobin A1c; Future; Expected date: 09/01/2020    Encounter for screening mammogram for malignant neoplasm of breast  -     Mammo Digital Screening Bilat; Future; Expected date: 03/18/2020    Screening for HIV (human immunodeficiency virus)  -     HIV 1/2 Ag/Ab (4th Gen); Future; Expected date: 03/18/2020    Follow up in about 6 months (around 9/18/2020) for chronic medical issues.

## 2020-03-27 ENCOUNTER — TELEPHONE (OUTPATIENT)
Dept: GASTROENTEROLOGY | Facility: CLINIC | Age: 55
End: 2020-03-27

## 2020-05-05 ENCOUNTER — PATIENT MESSAGE (OUTPATIENT)
Dept: ADMINISTRATIVE | Facility: HOSPITAL | Age: 55
End: 2020-05-05

## 2020-05-07 ENCOUNTER — HOSPITAL ENCOUNTER (OUTPATIENT)
Dept: RADIOLOGY | Facility: HOSPITAL | Age: 55
Discharge: HOME | End: 2020-05-07
Attending: INTERNAL MEDICINE
Payer: COMMERCIAL

## 2020-05-07 DIAGNOSIS — R92.8 ABNORMAL MAMMOGRAM: ICD-10-CM

## 2020-05-07 PROCEDURE — 77061 BREAST TOMOSYNTHESIS UNI: CPT | Mod: 26,,, | Performed by: RADIOLOGY

## 2020-05-07 PROCEDURE — 77061 BREAST TOMOSYNTHESIS UNI: CPT | Mod: TC,PO

## 2020-05-07 PROCEDURE — 77061 MAMMO DIGITAL DIAGNOSTIC RIGHT WITH TOMOSYNTHESIS_CAD: ICD-10-PCS | Mod: 26,,, | Performed by: RADIOLOGY

## 2020-05-07 PROCEDURE — 77065 DX MAMMO INCL CAD UNI: CPT | Mod: TC,PO

## 2020-05-07 PROCEDURE — 77065 MAMMO DIGITAL DIAGNOSTIC RIGHT WITH TOMOSYNTHESIS_CAD: ICD-10-PCS | Mod: 26,,, | Performed by: RADIOLOGY

## 2020-05-07 PROCEDURE — 77065 DX MAMMO INCL CAD UNI: CPT | Mod: 26,,, | Performed by: RADIOLOGY

## 2020-05-12 ENCOUNTER — TELEPHONE (OUTPATIENT)
Dept: FAMILY MEDICINE | Facility: CLINIC | Age: 55
End: 2020-05-12

## 2020-05-12 DIAGNOSIS — R92.8 ABNORMAL MAMMOGRAM: Primary | ICD-10-CM

## 2020-05-12 NOTE — TELEPHONE ENCOUNTER
Please let her know that her mammogram diagnostic on the right was read as benign but would like to recheck in 6months.     Please schedule bilateral diagnostic mammogram in 6 months      Thanks

## 2020-05-15 ENCOUNTER — TELEPHONE (OUTPATIENT)
Dept: GASTROENTEROLOGY | Facility: CLINIC | Age: 55
End: 2020-05-15

## 2020-05-15 ENCOUNTER — PATIENT MESSAGE (OUTPATIENT)
Dept: GASTROENTEROLOGY | Facility: CLINIC | Age: 55
End: 2020-05-15

## 2020-05-27 ENCOUNTER — PATIENT MESSAGE (OUTPATIENT)
Dept: FAMILY MEDICINE | Facility: CLINIC | Age: 55
End: 2020-05-27

## 2020-05-29 ENCOUNTER — TELEPHONE (OUTPATIENT)
Dept: GASTROENTEROLOGY | Facility: CLINIC | Age: 55
End: 2020-05-29

## 2020-05-29 ENCOUNTER — OFFICE VISIT (OUTPATIENT)
Dept: FAMILY MEDICINE | Facility: CLINIC | Age: 55
End: 2020-05-29
Payer: COMMERCIAL

## 2020-05-29 VITALS
BODY MASS INDEX: 33.38 KG/M2 | HEIGHT: 63 IN | HEART RATE: 72 BPM | OXYGEN SATURATION: 98 % | TEMPERATURE: 97 F | DIASTOLIC BLOOD PRESSURE: 88 MMHG | WEIGHT: 188.38 LBS | SYSTOLIC BLOOD PRESSURE: 138 MMHG

## 2020-05-29 DIAGNOSIS — L53.8 MACULAR ERYTHEMATOUS RASH: Primary | ICD-10-CM

## 2020-05-29 PROCEDURE — 3075F SYST BP GE 130 - 139MM HG: CPT | Mod: CPTII,S$GLB,, | Performed by: INTERNAL MEDICINE

## 2020-05-29 PROCEDURE — 3008F PR BODY MASS INDEX (BMI) DOCUMENTED: ICD-10-PCS | Mod: CPTII,S$GLB,, | Performed by: INTERNAL MEDICINE

## 2020-05-29 PROCEDURE — 99213 OFFICE O/P EST LOW 20 MIN: CPT | Mod: S$GLB,,, | Performed by: INTERNAL MEDICINE

## 2020-05-29 PROCEDURE — 3008F BODY MASS INDEX DOCD: CPT | Mod: CPTII,S$GLB,, | Performed by: INTERNAL MEDICINE

## 2020-05-29 PROCEDURE — 3075F PR MOST RECENT SYSTOLIC BLOOD PRESS GE 130-139MM HG: ICD-10-PCS | Mod: CPTII,S$GLB,, | Performed by: INTERNAL MEDICINE

## 2020-05-29 PROCEDURE — 3079F PR MOST RECENT DIASTOLIC BLOOD PRESSURE 80-89 MM HG: ICD-10-PCS | Mod: CPTII,S$GLB,, | Performed by: INTERNAL MEDICINE

## 2020-05-29 PROCEDURE — 3079F DIAST BP 80-89 MM HG: CPT | Mod: CPTII,S$GLB,, | Performed by: INTERNAL MEDICINE

## 2020-05-29 PROCEDURE — 99213 PR OFFICE/OUTPT VISIT, EST, LEVL III, 20-29 MIN: ICD-10-PCS | Mod: S$GLB,,, | Performed by: INTERNAL MEDICINE

## 2020-05-29 RX ORDER — LAMOTRIGINE 25 MG/1
75 TABLET ORAL DAILY
COMMUNITY
Start: 2020-05-12 | End: 2023-06-05 | Stop reason: SDUPTHER

## 2020-05-29 RX ORDER — GALCANEZUMAB 120 MG/ML
INJECTION, SOLUTION SUBCUTANEOUS
COMMUNITY
Start: 2020-05-20 | End: 2023-08-04

## 2020-05-29 RX ORDER — PRENATAL VIT 91/IRON/FOLIC/DHA 28-975-200
COMBINATION PACKAGE (EA) ORAL 2 TIMES DAILY
Qty: 30 G | Refills: 0 | Status: SHIPPED | OUTPATIENT
Start: 2020-05-29 | End: 2022-10-15

## 2020-05-29 RX ORDER — ATENOLOL 50 MG/1
TABLET ORAL 2 TIMES DAILY
COMMUNITY
Start: 2020-03-18 | End: 2023-06-05 | Stop reason: SDUPTHER

## 2020-05-29 NOTE — PROGRESS NOTES
Subjective:       Patient ID: Delaney Noel is a 55 y.o. female.    Medication List with Changes/Refills   New Medications    TERBINAFINE HCL (LAMISIL) 1 % CREAM    Apply topically 2 (two) times daily.   Current Medications    ALBUTEROL (PROVENTIL/VENTOLIN HFA) 90 MCG/ACTUATION INHALER    INHALE 2 PUFFS INTO THE LUNGS EVERY 6 (SIX) HOURS AS NEEDED FOR WHEEZING. RESCUE    ATENOLOL (TENORMIN) 50 MG TABLET        CLONAZEPAM (KLONOPIN) 0.5 MG TABLET    Take 0.5 mg by mouth once daily.    EMGALITY  MG/ML PNIJ        ESCITALOPRAM OXALATE (LEXAPRO) 20 MG TABLET        GABAPENTIN (NEURONTIN) 300 MG CAPSULE    Take 300 mg by mouth nightly.    IBUPROFEN (ADVIL,MOTRIN) 800 MG TABLET    Take 800 mg by mouth 3 (three) times daily as needed.     LAMOTRIGINE (LAMICTAL) 25 MG TABLET        LEVOTHYROXINE (SYNTHROID) 50 MCG TABLET    Take levothyroxine 50 mcg on Tues-Thurs-SAt and Sun    LEVOTHYROXINE (SYNTHROID) 75 MCG TABLET    Take 1 tablet on [facility].    MELOXICAM (MOBIC) 15 MG TABLET    Take 1 tablet (15 mg total) by mouth once daily.    METHOCARBAMOL (ROBAXIN) 750 MG TAB        QUETIAPINE (SEROQUEL) 100 MG TAB    Take 100 mg by mouth once daily.    RIMEGEPANT (NURTEC) ODT 75 MG    Take 75 mg by mouth once as needed for Migraine. Place ODT tablet on the tongue; alternatively the ODT tablet may be placed under the tongue    ROSUVASTATIN (CRESTOR) 10 MG TABLET    Take 10 mg by mouth once daily.    Discontinued Medications    ATENOLOL (TENORMIN) 100 MG TABLET    Take 1 tablet (100 mg total) by mouth once daily.    ESCITALOPRAM OXALATE (LEXAPRO) 10 MG TABLET        GALCANEZUMAB-GNLM (EMGALITY PEN SUBQ)    Inject 1 Dose into the skin every 30 days.    ONZETRA XSAIL 11 MG AEPB    as needed.     SUMATRIPTAN (IMITREX) 100 MG TABLET    Take 100 mg by mouth every 2 (two) hours as needed.        Chief Complaint: Rash  She has multiple spots on her upper back that her  noticed 3 days ago. No itching or pain. No change  "in size. No changes in detergents or soaps.  The lesions are circular an she is concerned it is ringworm. She has cats but they are indoors. She has not been out in the yard or woods.  The lesion do not itch.     Review of Systems   Constitutional: Negative for activity change, appetite change, chills, fatigue and fever.   HENT: Negative for congestion, ear discharge, ear pain, mouth sores, postnasal drip, rhinorrhea, sinus pressure and sore throat.    Eyes: Negative for pain, discharge and redness.   Respiratory: Negative for cough, chest tightness, shortness of breath and wheezing.    Gastrointestinal: Negative for abdominal pain, constipation, diarrhea, nausea and vomiting.   Genitourinary: Negative for dysuria.   Musculoskeletal: Negative for arthralgias and neck stiffness.   Skin: Positive for rash.   Neurological: Negative for headaches.   Hematological: Negative for adenopathy.       Objective:      Vitals:    05/29/20 0908   BP: 138/88   Pulse: 72   Temp: 97.1 °F (36.2 °C)   TempSrc: Temporal   SpO2: 98%   Weight: 85.5 kg (188 lb 6.1 oz)   Height: 5' 3" (1.6 m)     Body mass index is 33.37 kg/m².  Physical Exam    General appearance: alert, no acute distress  Neck: supple, FROM, no masses, no tenderness  Lymph: no posterior or cervical adenopathy  Lungs: no distress, no retractions, clear to ascultation bilaterally, no wheezing, no rales, no rhonchi  Heart:: Regular rate and rhythm, no murmur  Abdomen: soft, non-tender, no guarding, no rebound, no peritoneal signs, bowel sounds normal, no hepatosplenomegaly, no masses  Skin: multiple circular plaques on upper back, 2 near right shoulder, one midline near bra line, and 2 on left side of upper back, all lesion about 1 cm x 1 cm in size.   Perfusion: good capillary refill, normal pulses                          Assessment:       1. Macular erythematous rash        Plan:       Macular erythematous rash  One lesion consistent with possible tinea but low clinical " suspicion. I am okay with doing a trial of treatment with antifungal cream OTC for one week but if no improvement or any of the areas worsen or she develops new lesions then will refer to dermatology for evaluation.   -     terbinafine HCL (LAMISIL) 1 % cream; Apply topically 2 (two) times daily.  Dispense: 30 g; Refill: 0    Follow up if symptoms worsen or fail to improve.

## 2020-06-29 ENCOUNTER — LAB VISIT (OUTPATIENT)
Dept: FAMILY MEDICINE | Facility: CLINIC | Age: 55
End: 2020-06-29
Payer: COMMERCIAL

## 2020-06-29 PROCEDURE — U0003 INFECTIOUS AGENT DETECTION BY NUCLEIC ACID (DNA OR RNA); SEVERE ACUTE RESPIRATORY SYNDROME CORONAVIRUS 2 (SARS-COV-2) (CORONAVIRUS DISEASE [COVID-19]), AMPLIFIED PROBE TECHNIQUE, MAKING USE OF HIGH THROUGHPUT TECHNOLOGIES AS DESCRIBED BY CMS-2020-01-R: HCPCS

## 2020-06-30 LAB — SARS-COV-2 RNA RESP QL NAA+PROBE: NOT DETECTED

## 2020-06-30 NOTE — H&P
History & Physical - Short Stay  Gastroenterology      SUBJECTIVE:     Procedure: Colonoscopy    Chief Complaint/Indication for Procedure: Surveillance, Hx of colon polyps    History of Present Illness:  Asymptomatic    Office Visit    12/16/2019  [facility]     Yesi Swartz DO  Internal Medicine  Essential hypertension +14 more  Dx  Follow-up  Reason for Visit      Progress Notes  Yesi Swartz DO (Physician)   Internal Medicine   12/16/2019 10:00 AM   Signed  Expand All Collapse All    Subjective:       Patient ID: Delaney Noel is a 54 y.o. female.    Chief Complaint: Follow-up  She is here today to f/u on chronic medical issues      She has hypertension and is taking atenolol 100 mg qday. She BP at home this week has been good. She has no known CAD (atenolol originally chosen due to migraines). She denies chest pain but does have shortness of breath with exertion. She does feel that she wheezes and this improves with rest after about 10 minutes. She was seen by Dr. Kim in cardiology and is scheduled for a stress test and echo in January.      She has hyperlipidemia and is taking crestor 10 mg qday. Her last lipids were on 10/2019 were 136/162/37/66.         She has hypothyroid and is taking levothyroxine 50 mcg qday on Tues-Thurs-Sat and Sun and 75 mcg qday on Mon-Wed-Fri.  This was adjusted for TSH of 5.8 on 10/2019. She does report when hospitalized she was told her TSH was 11.  She did have a thyroid u/s on 11/2018 showing thyroiditis and unchanged from previous.      She has a small 4 mm lung nodule seen on CT on 7/2014 that she has not had rechecked.      She has chronic migraines and is followed by neurology. She has had for many years.  She was having at least 4 migraines a week that would last 2 to 7 days at times.  She is sensitive to light with nausea, dizziness and confusion.  Migraines improved with rest and onzetra (long acting imitrex but too costly for regular  use). She was started on ajovy injection and has done well. She is having 3 to 6 headaches a month.  No side effects and tolerating well.  Since discharge from the psychiatric hospital she has had increased headaches (about 6 this last week).       She has multiple psychiatric issues. She has major depression with suicidal ideations (recent hospitalization for suicidal thoughts on 12/3/2019), anxiety with panic attack, bipolar type 2, PTSD with insomnia and OCD.  On 12/3/2019 she had suicidal ideations and was sent to [facility] inpatient psychiatric unit. Her medications were changed and she is now taking seroquel 100 mg qday with klonopin 0.5 mg qam. She has xanax that she uses PRN for anxiety attacks. She feels this is helping her symptoms and denies any suicidal ideations at this time. She does continue with crying spells all day and has been unable to work. She is to start IOP three times a week this week. She is hoping psychiatry adjusts her dose of seroquel. She feels her anxiety is better and has not had a panic attack.      She has chronic low back pain since 10/2017. Around the time it was diagnosed she was having pain down her right leg and trouble walking.  She had an MRI of lumbar and cervical spine in 1/2019 showing mild spondylotic changes in L4-L5, L5-S1 and mild disease in cervical vertebra. She sent to PT which she feels helped with her symptoms. She was doing exercises at home for a while but has stopped. She was taking mobic 15 mg qam which helped and gabapentin 300 mg qhs which helps with muscle spasms.  She reports pain worse with prolonged sitting or driving, and better with moving, home exercises, mobic and gabapentin.  Rated 1/10 to 10/10.  No weakness but her pain does radiate down her right leg.  She does report increased pain since her hospitalization due to prolonged sitting.      She lives wit her  and feels safe at home. She denies any homicidal ideations or suicidal ideations  today.  She does not exercise.  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      Colonoscopy---8/2014 repeat 5 years---due  Mammogram----110/2019 neg  Pap-----12/2017 neg HPV neg  Tdap---9/2019  Influenza vaccine-----9/2019   Pneumovax 23----none  Shingles vaccine-----none    Assessment:       1. Essential hypertension    2. Hyperlipidemia, unspecified hyperlipidemia type    3. Shortness of breath    4. Lung nodule    5. Hypothyroidism, unspecified type    6. Migraine without aura and without status migrainosus, not intractable    7. Major depressive disorder, recurrent episode, moderate    8. Bipolar 2 disorder    9. PTSD (post-traumatic stress disorder)    10. HUNTER (generalized anxiety disorder)    11. Panic attack    12. Psychophysiological insomnia    13. Osteoarthritis of cervical and lumbar spine    14. Lumbar radicular pain    15. Screen for colon cancer        Plan:       Essential hypertension  Good control on this regimen.      Hyperlipidemia, unspecified hyperlipidemia type  Good control on crestor.      Shortness of breath  Question if cardiac vs pulmonary. She is having full cardiac workup in January. Will give her a trial of albuterol when she has symptoms. If she responses then consider PFTs.    -     CT Chest With Contrast; Future; Expected date: 12/16/2019  -     albuterol (VENTOLIN HFA) 90 mcg/actuation inhaler; Inhale 2 puffs into the lungs every 6 (six) hours as needed for Wheezing. Rescue  Dispense: 18 g; Refill: 2     Lung nodule  Will do a CT chest for surveillance.   -     CT Chest With Contrast; Future; Expected date: 12/16/2019     Hypothyroidism, unspecified type  Noted to be slow and her levothyroxine dose was adjusted. She is due to recheck TSH (50 mcg T-Th-Sat-Sun and 75 mcg M-W-F).    -     TSH     Migraine without aura and without status migrainosus, not  intractable  Improved on current regimen.      Major depressive disorder, recurrent episode, moderate/Bipolar 2 disorder/PTSD (post-traumatic stress disorder)  Improved since d/c from hospital on seroquel. She still is not controlled but improved. She is to start IOP and her medications will be managed. No suicidal ideations at this time.      HUNTER (generalized anxiety disorder) with Panic attack  Improved on this regimen.      Psychophysiological insomnia  Improved on klonopin and seroquel.      Osteoarthritis of cervical and lumbar spine with Lumbar radicular pain  Symptoms increased after hospitalization due to inactivity. She is starting her home exercise program and continue her current regimen.       Screen for colon cancer  -     Fecal Immunochemical Test (iFOBT); Future; Expected date: 12/16/2019  -     Case request GI: COLONOSCOPY     Follow up in about 3 months (around 3/16/2020) for chronic medical issues/shortness of breath.            See last colonoscopy 8/4/2014:  Impression:  - One 2 mm polyp in the rectum. Resected and retrieved.                        - Tortuous colon.                        - Internal hemorrhoids.                        - The examination was otherwise normal. (IBS?)                        - The examined portion of the ileum was normal. Biopsied.   Recommendation:      - Discharge patient to home.                        - Await pathology results.                        - If the pathology report reveals adenomatous                        tissue, then repeat the colonoscopy for surveillance                        in 5 years.                        - If the pathology report indicates hyperplastic                        polyp, then repeat colonoscopy for surveillance in 8                        years.                        - High fiber diet.                        - Use fiber, for example Citrucel, Fibercon, Konsyl                        or Metamucil.                        - Take a  PROBIOTIC, such as a carton of GREEK YOGURT                        (Chobani or Oikos, or Activia or Dannon); or tablets                        of ALIGN or CULTURELLE or TIFFANIE-Q (all                        non-prescription), every day for a month.                        - Call the G.I. clinic in 2 weeks for reports (if                        you haven't heard from us sooner) [phone].                        - Return to primary care physician.                        - Return to GI clinic PRN.                        - Continue present medications.                          Thiago Lozoya MD   8/4/2014     SPECIMEN  1) Random terminal ileum.  2) Random cecum and right colon.  3) Random rectosigmoid.  4) Rectal polyp at 15.  FINAL PATHOLOGIC DIAGNOSIS  #1. SMALL INTESTINAL MUCOSA WITH NO PATHOLOGIC CHANGE.  NO ACTIVE INFLAMMATION OR DYSPLASIA IDENTIFIED  #2. COLONIC MUCOSA WITH NO PATHOLOGIC CHANGE.  NO MICROSCOPIC COLITIS OR DYSPLASIA IDENTIFIED.  #3. COLONIC MUCOSA WITH NO PATHOLOGIC CHANGE.  NO MICROSCOPIC COLITIS OR DYSPLASIA IDENTIFIED.  #4. 4 FRAGMENTS OF HYPERPLASTIC POLYP AND 2 FRAGMENTS OF TUBULAR ADENOMA        PTA Medications   Medication Sig    albuterol (PROVENTIL/VENTOLIN HFA) 90 mcg/actuation inhaler INHALE 2 PUFFS INTO THE LUNGS EVERY 6 (SIX) HOURS AS NEEDED FOR WHEEZING. RESCUE    atenoloL (TENORMIN) 50 MG tablet     clonazePAM (KLONOPIN) 0.5 MG tablet Take 0.5 mg by mouth once daily.    EMGALITY  mg/mL PnIj     escitalopram oxalate (LEXAPRO) 20 MG tablet     gabapentin (NEURONTIN) 300 MG capsule Take 300 mg by mouth nightly.    ibuprofen (ADVIL,MOTRIN) 800 MG tablet Take 800 mg by mouth 3 (three) times daily as needed.     lamoTRIgine (LAMICTAL) 25 MG tablet     levothyroxine (SYNTHROID) 50 MCG tablet Take levothyroxine 50 mcg on Tues-Thurs-SAt and Sun    levothyroxine (SYNTHROID) 75 MCG tablet Take 1 tablet on Hrm-Ghe-Tqbinl.    meloxicam (MOBIC) 15 MG tablet Take 1 tablet (15  mg total) by mouth once daily.    methocarbamol (ROBAXIN) 750 MG Tab     QUEtiapine (SEROQUEL) 100 MG Tab Take 100 mg by mouth once daily.    rimegepant (NURTEC) ODT 75 mg Take 75 mg by mouth once as needed for Migraine. Place ODT tablet on the tongue; alternatively the ODT tablet may be placed under the tongue    rosuvastatin (CRESTOR) 10 MG tablet Take 10 mg by mouth once daily.     terbinafine HCL (LAMISIL) 1 % cream Apply topically 2 (two) times daily.       Review of patient's allergies indicates:  No Known Allergies     Past Medical History:   Diagnosis Date    Allergy     Depression     Hx of colonic polyps 2014    per colonoscopy report    Hypertension     Migraine     Suicidal thoughts 2019    [facility] tx     Thyroid disease     Hypothyroid, [facility]     Past Surgical History:   Procedure Laterality Date    COLONOSCOPY      cystoscope      HYSTERECTOMY      JAMIE with BSO- age 42    OOPHORECTOMY       Family History   Problem Relation Age of Onset    Heart disease Mother         had childhood rheumatic fever    Cancer Mother         uterine cancer    Cervical cancer Mother 28    Atrial fibrillation Father     Cancer Maternal Grandmother         uterine cancer    Stroke Maternal Grandmother     Heart disease Maternal Grandmother 50        MI    Stroke Maternal Grandfather     Nephrolithiasis Neg Hx      Social History     Tobacco Use    Smoking status: Former Smoker     Packs/day: 0.25     Years: 25.00     Pack years: 6.25     Quit date: 2015     Years since quittin.0    Smokeless tobacco: Never Used    Tobacco comment: had quit x 2   Substance Use Topics    Alcohol use: Yes     Alcohol/week: 2.0 standard drinks     Types: 2 Cans of beer per week     Frequency: Monthly or less     Drinks per session: 1 or 2     Binge frequency: Never     Comment: once a week     Drug use: No         OBJECTIVE:     Vital Signs (Most Recent)  Temp: 97.2 °F (36.2 °C) (20  "0730)  Pulse: 83 (07/01/20 0730)  Resp: 16 (07/01/20 0730)  BP: (!) 161/94 (07/01/20 0730)  SpO2: 100 % (07/01/20 0730)    Physical Exam:  :Ht 5' 3" (1.6 m)   Wt 85.5 kg (188 lb 6.1 oz) BMI 33.37 kg/m²                                                        GENERAL:  Comfortable, in no acute distress.                                 HEENT EXAM:  Nonicteric.  No adenopathy.  Oropharynx is clear.               NECK:  Supple.                                                               LUNGS:  Clear.                                                               CARDIAC:  Regular rate and rhythm.  S1, S2.  No murmur.                      ABDOMEN:  Obese,  Soft, positive bowel sounds, nontender.  No hepatosplenomegaly or masses.  No rebound or guarding.                                             EXTREMITIES:  No edema.     MENTAL STATUS:  Alert and oriented.    ASSESSMENT/PLAN:     Assessment: Surveillance, Hx of colon polyps    Plan: Colonoscopy    Anesthesia Plan:   MAC / General Anaesthesia    ASA Grade: ASA 2 - Patient with mild systemic disease with no functional limitations    MALLAMPATI SCORE: II (hard and soft palate, upper portion of tonsils anduvula visible)    "

## 2020-07-01 ENCOUNTER — ANESTHESIA EVENT (OUTPATIENT)
Dept: ENDOSCOPY | Facility: HOSPITAL | Age: 55
End: 2020-07-01
Payer: COMMERCIAL

## 2020-07-01 ENCOUNTER — HOSPITAL ENCOUNTER (OUTPATIENT)
Facility: HOSPITAL | Age: 55
Discharge: HOME | End: 2020-07-01
Attending: INTERNAL MEDICINE | Admitting: INTERNAL MEDICINE
Payer: COMMERCIAL

## 2020-07-01 ENCOUNTER — ANESTHESIA (OUTPATIENT)
Dept: ENDOSCOPY | Facility: HOSPITAL | Age: 55
End: 2020-07-01
Payer: COMMERCIAL

## 2020-07-01 DIAGNOSIS — Z12.11 COLON CANCER SCREENING: ICD-10-CM

## 2020-07-01 PROCEDURE — G0105 COLORECTAL SCRN; HI RISK IND: HCPCS | Mod: PO | Performed by: INTERNAL MEDICINE

## 2020-07-01 PROCEDURE — D9220A PRA ANESTHESIA: Mod: ANES,,, | Performed by: ANESTHESIOLOGY

## 2020-07-01 PROCEDURE — D9220A PRA ANESTHESIA: ICD-10-PCS | Mod: ANES,,, | Performed by: ANESTHESIOLOGY

## 2020-07-01 PROCEDURE — 37000008 HC ANESTHESIA 1ST 15 MINUTES: Mod: PO | Performed by: INTERNAL MEDICINE

## 2020-07-01 PROCEDURE — D9220A PRA ANESTHESIA: ICD-10-PCS | Mod: CRNA,,, | Performed by: NURSE ANESTHETIST, CERTIFIED REGISTERED

## 2020-07-01 PROCEDURE — G0121 COLON CA SCRN NOT HI RSK IND: HCPCS | Mod: PO | Performed by: INTERNAL MEDICINE

## 2020-07-01 PROCEDURE — 63600175 PHARM REV CODE 636 W HCPCS: Mod: PO | Performed by: INTERNAL MEDICINE

## 2020-07-01 PROCEDURE — D9220A PRA ANESTHESIA: Mod: CRNA,,, | Performed by: NURSE ANESTHETIST, CERTIFIED REGISTERED

## 2020-07-01 PROCEDURE — G0105 COLORECTAL SCRN; HI RISK IND: HCPCS | Mod: ,,, | Performed by: INTERNAL MEDICINE

## 2020-07-01 PROCEDURE — G0105 COLORECTAL SCRN; HI RISK IND: ICD-10-PCS | Mod: ,,, | Performed by: INTERNAL MEDICINE

## 2020-07-01 PROCEDURE — 37000009 HC ANESTHESIA EA ADD 15 MINS: Mod: PO | Performed by: INTERNAL MEDICINE

## 2020-07-01 PROCEDURE — 63600175 PHARM REV CODE 636 W HCPCS: Mod: PO | Performed by: NURSE ANESTHETIST, CERTIFIED REGISTERED

## 2020-07-01 RX ORDER — PROPOFOL 10 MG/ML
VIAL (ML) INTRAVENOUS
Status: DISCONTINUED | OUTPATIENT
Start: 2020-07-01 | End: 2020-07-01

## 2020-07-01 RX ORDER — SODIUM CHLORIDE 0.9 % (FLUSH) 0.9 %
10 SYRINGE (ML) INJECTION
Status: DISCONTINUED | OUTPATIENT
Start: 2020-07-01 | End: 2020-07-01 | Stop reason: HOSPADM

## 2020-07-01 RX ORDER — LIDOCAINE HYDROCHLORIDE 20 MG/ML
INJECTION INTRAVENOUS
Status: DISCONTINUED | OUTPATIENT
Start: 2020-07-01 | End: 2020-07-01

## 2020-07-01 RX ORDER — PROPOFOL 10 MG/ML
VIAL (ML) INTRAVENOUS CONTINUOUS PRN
Status: DISCONTINUED | OUTPATIENT
Start: 2020-07-01 | End: 2020-07-01

## 2020-07-01 RX ORDER — SODIUM CHLORIDE, SODIUM LACTATE, POTASSIUM CHLORIDE, CALCIUM CHLORIDE 600; 310; 30; 20 MG/100ML; MG/100ML; MG/100ML; MG/100ML
INJECTION, SOLUTION INTRAVENOUS CONTINUOUS
Status: DISCONTINUED | OUTPATIENT
Start: 2020-07-01 | End: 2020-07-01 | Stop reason: HOSPADM

## 2020-07-01 RX ADMIN — PROPOFOL 70 MG: 10 INJECTION, EMULSION INTRAVENOUS at 08:07

## 2020-07-01 RX ADMIN — SODIUM CHLORIDE, SODIUM LACTATE, POTASSIUM CHLORIDE, AND CALCIUM CHLORIDE: .6; .31; .03; .02 INJECTION, SOLUTION INTRAVENOUS at 07:07

## 2020-07-01 RX ADMIN — LIDOCAINE HYDROCHLORIDE 80 MG: 20 INJECTION, SOLUTION INTRAVENOUS at 08:07

## 2020-07-01 RX ADMIN — PROPOFOL 200 MCG/KG/MIN: 10 INJECTION, EMULSION INTRAVENOUS at 08:07

## 2020-07-01 NOTE — ANESTHESIA PREPROCEDURE EVALUATION
2020  Delaney Noel is a 55 y.o., female.    Anesthesia Evaluation    I have reviewed the Patient Summary Reports.    I have reviewed the Nursing Notes. I have reviewed the NPO Status.   I have reviewed the Medications.     Review of Systems  Social:  Former Smoker Smoking Status: Former Smoker - 6.25 pack years  Quit Smokin/02/15  Smokeless Tobacco Status: Never Used  Alcohol use: Yes; 2.0 standard drinks per week  Drug use: No       Cardiovascular:   Hypertension    Neurological:   Headaches    Endocrine:   Hypothyroidism    Psych:   Psychiatric History          Physical Exam  General:  Morbid Obesity    Airway/Jaw/Neck:  Airway Findings: Mouth Opening: Normal Tongue: Normal  General Airway Assessment: Adult, Good  Mallampati: II  Improves to II with phonation.  TM Distance: 4-6 cm      Dental:  Dental Findings: In tact   Chest/Lungs:  Chest/Lungs Findings: Clear to auscultation, Normal Respiratory Rate     Heart/Vascular:  Heart Findings: Rate: Normal  Rhythm: Regular Rhythm  Sounds: Normal  Heart murmur: negative       Mental Status:  Mental Status Findings:  Cooperative, Alert and Oriented         Anesthesia Plan  Type of Anesthesia, risks & benefits discussed:  Anesthesia Type:  general  Patient's Preference:   Intra-op Monitoring Plan: standard ASA monitors  Intra-op Monitoring Plan Comments:   Post Op Pain Control Plan:   Post Op Pain Control Plan Comments:   Induction:   IV  Beta Blocker:  Patient is on a Beta-Blocker and has received one dose within the past 24 hours (No further documentation required).       Informed Consent: Patient understands risks and agrees with Anesthesia plan.  Questions answered. Anesthesia consent signed with patient.  ASA Score: 3     Day of Surgery Review of History & Physical: I have interviewed and examined the patient. I have reviewed the patient's H&P  dated:  There are no significant changes.  H&P update referred to the surgeon.  H&P completed by Anesthesiologist.       Ready For Surgery From Anesthesia Perspective.

## 2020-07-01 NOTE — [employer] PATIENT INSTRUCTIONS
Discharge Summary/Instructions for after Colonoscopy without   Biopsy/Polypectomy  Delaney Noel    Wednesday, July 1, 2020  Thiago Lozoya MD  RESTRICTIONS ON ACTIVITY:  - Do not drive a car or operate machinery until the day after the procedure.      - The following day: return to full activity including work.  - For  3 days: No heavy lifting, straining or running.  - Diet: You may eat solid foods, but no gassy foods (i.e. beans, broccoli,   cabbage, etc).  TREATMENT FOR COMMON SIDE EFFECTS:  - Mild abdominal pain and bloating or excessive gas: rest, eat lightly and   use a heating pad.  SYMPTOMS TO WATCH FOR AND REPORT TO YOUR PHYSICIAN:  1. Severe abdominal pain.  2. Fever within 24 hours after a procedure.  3. A large amount of rectal bleeding. (A small amount of blood from the   rectum is not serious, especially if hemorrhoids are present.  3.  Because air was put into your colon during the procedure, expelling   large amounts of air from your rectum is normal.  4.  You may not have a bowel movement for 1-3 days because of the   colonoscopy prep.  This is normal.  5.  Call immediately if you notice any of the following:   Chills and/or fever over 101   Persistent vomiting   Severe abdominal pain, other than gas cramps   Severe chest pain   Black, tarry stools   Any bleeding - exceeding one tablespoon  Your doctor recommends these additional instructions:  Eat a high fiber diet.   Take a fiber supplement, for example Citrucel, Fibercon, Konsyl or   Metamucil.   Take a PROBIOTIC, such as a carton of GREEK YOGURT (Chobani or Oikos,  or   Activia or Dannon);  or tablets of ALIGN or CULTURELLE or TIFFANIE-Q (all   non-prescription), every day for a month.   And repeat this at least 5-6   times a year.  Your physician has recommended a repeat colonoscopy in seven years for   surveillance.  None  If you have any questions or problems, please call your physician.  EMERGENCY PHONE NUMBER: [phone]  LAB  RESULTS: Call in two (2) weeks for lab results, [phone]  ___________________________________________  Nurse Signature  ___________________________________________  Patient/Designated Responsible Party Signature  Thiago Lozoya MD  7/1/2020 8:52:18 AM  This report has been verified and signed electronically.  [employer]

## 2020-07-01 NOTE — DISCHARGE INSTRUCTIONS
Recovery After Procedural Sedation (Adult)   You have been given medicine by vein to make you sleep during your surgery. This may have included both a pain medicine and sleeping medicine. Most of the effects have worn off. But you may still have some drowsiness for the next 6 to 8 hours.  Home care  Follow these guidelines when you get home:  · For the next 8 hours, you should be watched by a responsible adult. This person should make sure your condition is not getting worse.  · Don't drink any alcohol for the next 24 hours.  · Don't drive, operate dangerous machinery, or make important business or personal decisions during the next 24 hours.  · To prevent injury or falls, use caution when standing and walking for at least 24 hours after your procedure.  Note: Your healthcare provider may tell you not to take any medicine by mouth for pain or sleep in the next 4 hours. These medicines may react with the medicines you were given in the hospital. This could cause a much stronger response than usual.  Follow-up care  Follow up with your healthcare provider if you are not alert and back to your usual level of activity within 12 hours.  When to seek medical advice  Call your healthcare provider right away if any of these occur:  · Drowsiness gets worse  · Weakness or dizziness gets worse  · Repeated vomiting  · You can't be awakened  · Fever  · New rash  [employer] last reviewed this educational content on 9/1/2019  © 7785-2674 [employer]. [address] [place] [address]. All rights reserved. This information is not intended as a substitute for professional medical care. Always follow your healthcare professional's instructions.          PROBIOTICS:  Now that your colon is so cleaned out, now is a good time for a round of PROBIOTICS.   Take a  Probiotic product such as Align or Culturelle or Maggie-Q, every day for a month.                  (The products listed are non-prescription, but you may  need to ask the pharmacist for their location.)  Repeat this at least 5-6 times a year.          High-Fiber Diet  Fiber is in fruits, vegetables, cereals, and grains. Fiber passes through your body undigested. A high-fiber diet helps food move through your intestinal tract. The added bulk is helpful in preventing constipation. In people with diverticulosis, fiber helps clean out the pouches along the colon wall. It also prevents new pouches from forming. A high-fiber diet reduces the risk of colon cancer. It also lowers blood cholesterol and prevents high blood sugar in people with diabetes.    The fiber-rich foods listed below should be part of your diet. If you are not used to high-fiber foods, start with 1 or 2 foods from this list. Every 3 to 4 days add a new one to your diet. Do this until you are eating 4 high-fiber foods per day. This should give you 20 to 35 grams of fiber a day. It is also important to drink a lot of water when you are on this diet. You should have 6 to 8 glasses of water a day. Water makes the fiber swell and increases the benefit.  Foods high in dietary fiber  The following foods are high in dietary fiber:  · Breads. Breads made with 100% whole-wheat flour; roberta, wheat, or rye crackers; whole-grain tortillas, bran muffins.  · Cereals. Whole-grain and bran cereals with bran (shredded wheat, wheat flakes, raisin bran, corn bran); oatmeal, rolled oats, granola, and brown rice.  · Fruits. Fresh fruits and their edible skins (pears, prunes, raisins, berries, apples, and apricots); bananas, citrus fruit, mangoes, pineapple; and prune juice.  · Nuts. Any nuts and seeds.  · Vegetables. Best served raw or lightly cooked. All types, especially: green peas, celery, eggplant, potatoes, spinach, broccoli, [place] sprouts, winter squash, carrots, cauliflower, soybeans, lentils, and fresh and dried beans of all kinds.  · Other. Popcorn, any spices.  Date Last Reviewed: 8/1/2016  © 2386-9217 [employer]. [address] [place] [address], [place] [address]. All rights reserved. This information is not intended as a substitute for professional medical care. Always follow your healthcare professional's instructions.

## 2020-07-01 NOTE — TRANSFER OF CARE
"Anesthesia Transfer of Care Note    Patient: Delaney Noel    Procedure(s) Performed: Procedure(s) (LRB):  COLONOSCOPY (N/A)    Patient location: PACU    Anesthesia Type: general    Transport from OR: Transported from OR on room air with adequate spontaneous ventilation    Post pain: adequate analgesia    Post assessment: no apparent anesthetic complications and tolerated procedure well    Post vital signs: stable    Level of consciousness: awake, alert and oriented    Nausea/Vomiting: no nausea/vomiting    Complications: none    Transfer of care protocol was followed      Last vitals:   Visit Vitals  /79 (BP Location: Right arm, Patient Position: Lying)   Pulse 80   Temp 36.2 °C (97.2 °F) (Skin)   Resp 16   Ht 5' 3" (1.6 m)   Wt 84.4 kg (186 lb)   SpO2 100%   Breastfeeding No   BMI 32.95 kg/m²     "

## 2020-07-01 NOTE — BRIEF OP NOTE
Discharge Note  Short Stay      SUMMARY     Admit Date: 7/1/2020    Attending Physician: Thiaog Lozoya Jr., MD     Discharge Physician: Thiago Lozoya Jr., MD    Discharge Date: 7/1/2020 8:53 AM    Final Diagnosis: Screening for colon cancer [Z12.11]  Impression:          - Redundant colon.                        - The examination was otherwise normal.                        - The examined portion of the ileum was normal.                        - No specimens collected.   Recommendation:      - Discharge patient to home.                        - High fiber diet.                        - Use fiber, for example Citrucel, Fibercon, Konsyl                        or Metamucil.                        - Take a PROBIOTIC, such as a carton of GREEK YOGURT                        (Chobani or Oikos, or Activia or Dannon); or tablets                        of ALIGN or CULTURELLE or TIFFANIE-Q (all                        non-prescription), every day for a month.                        - Repeat colonoscopy in 7 years for surveillance.                        - Continue present medications.                        - Patient has a contact number available for                        emergencies. The signs and symptoms of potential                        delayed complications were discussed with the                        patient. Return to normal activities tomorrow.                        Written discharge instructions were provided to the                        patient.                        - Return to normal activities tomorrow.   Thiago Lozoya MD   7/1/2020  Disposition: HOME OR SELF CARE    Patient Instructions:   Current Discharge Medication List      CONTINUE these medications which have NOT CHANGED    Details   albuterol (PROVENTIL/VENTOLIN HFA) 90 mcg/actuation inhaler INHALE 2 PUFFS INTO THE LUNGS EVERY 6 (SIX) HOURS AS NEEDED FOR WHEEZING. RESCUE  Qty: 18 g, Refills: 2    Associated Diagnoses: Shortness of breath       atenoloL (TENORMIN) 50 MG tablet     Comments: .      clonazePAM (KLONOPIN) 0.5 MG tablet Take 0.5 mg by mouth once daily.  Refills: 2      EMGALITY  mg/mL PnIj       escitalopram oxalate (LEXAPRO) 20 MG tablet       gabapentin (NEURONTIN) 300 MG capsule Take 300 mg by mouth nightly.  Refills: 3      ibuprofen (ADVIL,MOTRIN) 800 MG tablet Take 800 mg by mouth 3 (three) times daily as needed.       lamoTRIgine (LAMICTAL) 25 MG tablet       !! levothyroxine (SYNTHROID) 50 MCG tablet Take levothyroxine 50 mcg on Tues-Thurs-SAt and Sun  Qty: 90 tablet, Refills: 3      !! levothyroxine (SYNTHROID) 75 MCG tablet Take 1 tablet on Pks-Fxp-Spuquq.  Qty: 30 tablet, Refills: 11      meloxicam (MOBIC) 15 MG tablet Take 1 tablet (15 mg total) by mouth once daily.  Qty: 90 tablet, Refills: 3    Associated Diagnoses: Osteoarthritis of cervical and lumbar spine      methocarbamol (ROBAXIN) 750 MG Tab       QUEtiapine (SEROQUEL) 100 MG Tab Take 100 mg by mouth once daily.      rimegepant (NURTEC) ODT 75 mg Take 75 mg by mouth once as needed for Migraine. Place ODT tablet on the tongue; alternatively the ODT tablet may be placed under the tongue      rosuvastatin (CRESTOR) 10 MG tablet Take 10 mg by mouth once daily.       terbinafine HCL (LAMISIL) 1 % cream Apply topically 2 (two) times daily.  Qty: 30 g, Refills: 0    Associated Diagnoses: Macular erythematous rash       !! - Potential duplicate medications found. Please discuss with provider.          Discharge Procedure Orders (must include Diet, Follow-up, Activity)    Follow Up:  Follow up with PCP as per your routine.  Please follow a high fiber diet.  Activity as tolerated.    No driving day of procedure.    PROBIOTICS:  Now that your colon is so cleaned out, now is a good time for a round of PROBIOTICS.   Take a  Probiotic product such as Align or Culturelle or Maggie-Q, every day for a month.                  (The products listed are non-prescription, but you may  need to ask the pharmacist for their location.)  Repeat this at least 5-6 times a year.

## 2020-07-01 NOTE — ANESTHESIA POSTPROCEDURE EVALUATION
Anesthesia Post Evaluation    Patient: Delaney Noel    Procedure(s) Performed: Procedure(s) (LRB):  COLONOSCOPY (N/A)    Final Anesthesia Type: general    Patient location during evaluation: PACU  Patient participation: Yes- Able to Participate  Level of consciousness: awake and alert and oriented  Post-procedure vital signs: reviewed and stable  Pain management: adequate  Airway patency: patent    PONV status at discharge: No PONV  Anesthetic complications: no      Cardiovascular status: blood pressure returned to baseline  Respiratory status: unassisted, spontaneous ventilation and room air  Hydration status: euvolemic  Follow-up not needed.          Vitals Value Taken Time   /80 07/01/20 0902   Temp  07/01/20 1000   Pulse 69 07/01/20 0902   Resp 14 07/01/20 0902   SpO2 100 % 07/01/20 0902         Event Time   Out of Recovery 09:03:14         Pain/Bouchra Score: Bouchra Score: 10 (7/1/2020  9:02 AM)

## 2020-07-02 VITALS
DIASTOLIC BLOOD PRESSURE: 80 MMHG | RESPIRATION RATE: 14 BRPM | SYSTOLIC BLOOD PRESSURE: 143 MMHG | HEIGHT: 63 IN | WEIGHT: 186 LBS | HEART RATE: 69 BPM | BODY MASS INDEX: 32.96 KG/M2 | OXYGEN SATURATION: 100 % | TEMPERATURE: 97 F

## 2020-11-20 ENCOUNTER — HOSPITAL ENCOUNTER (OUTPATIENT)
Dept: RADIOLOGY | Facility: HOSPITAL | Age: 55
Discharge: HOME | End: 2020-11-20
Attending: INTERNAL MEDICINE
Payer: COMMERCIAL

## 2020-11-20 DIAGNOSIS — R92.8 ABNORMAL MAMMOGRAM: ICD-10-CM

## 2020-11-20 PROCEDURE — 77066 DX MAMMO INCL CAD BI: CPT | Mod: TC,PO

## 2020-11-20 PROCEDURE — 77066 DX MAMMO INCL CAD BI: CPT | Mod: 26,,, | Performed by: RADIOLOGY

## 2020-11-20 PROCEDURE — 77062 BREAST TOMOSYNTHESIS BI: CPT | Mod: 26,,, | Performed by: RADIOLOGY

## 2020-11-20 PROCEDURE — 77066 MAMMO DIGITAL DIAGNOSTIC BILAT WITH TOMOSYNTHESIS_CAD: ICD-10-PCS | Mod: 26,,, | Performed by: RADIOLOGY

## 2020-11-20 PROCEDURE — 77062 MAMMO DIGITAL DIAGNOSTIC BILAT WITH TOMOSYNTHESIS_CAD: ICD-10-PCS | Mod: 26,,, | Performed by: RADIOLOGY

## 2020-11-25 ENCOUNTER — TELEPHONE (OUTPATIENT)
Dept: FAMILY MEDICINE | Facility: CLINIC | Age: 55
End: 2020-11-25

## 2020-11-25 DIAGNOSIS — R92.8 ABNORMAL MAMMOGRAM: Primary | ICD-10-CM

## 2020-11-25 NOTE — TELEPHONE ENCOUNTER
Please let her know that her screening mammogram showed a small area on the right that radiology felt would need to be re-imaged in about 6 months for surveillance.     The left breast was negative on the screen.     Please schedule diagnostic mammogram on right in 6 months.     thanks

## 2021-02-25 ENCOUNTER — IMMUNIZATION (OUTPATIENT)
Dept: FAMILY MEDICINE | Facility: CLINIC | Age: 56
End: 2021-02-25
Payer: COMMERCIAL

## 2021-02-25 DIAGNOSIS — Z23 NEED FOR VACCINATION: Primary | ICD-10-CM

## 2021-02-25 PROCEDURE — 91301 COVID-19, MRNA, LNP-S, PF, 100 MCG/0.5 ML DOSE VACCINE: CPT | Mod: S$GLB,,, | Performed by: FAMILY MEDICINE

## 2021-02-25 PROCEDURE — 0011A COVID-19, MRNA, LNP-S, PF, 100 MCG/0.5 ML DOSE VACCINE: CPT | Mod: CV19,S$GLB,, | Performed by: FAMILY MEDICINE

## 2021-02-25 PROCEDURE — 91301 COVID-19, MRNA, LNP-S, PF, 100 MCG/0.5 ML DOSE VACCINE: ICD-10-PCS | Mod: S$GLB,,, | Performed by: FAMILY MEDICINE

## 2021-02-25 PROCEDURE — 0011A COVID-19, MRNA, LNP-S, PF, 100 MCG/0.5 ML DOSE VACCINE: ICD-10-PCS | Mod: CV19,S$GLB,, | Performed by: FAMILY MEDICINE

## 2021-03-25 ENCOUNTER — IMMUNIZATION (OUTPATIENT)
Dept: FAMILY MEDICINE | Facility: CLINIC | Age: 56
End: 2021-03-25
Payer: COMMERCIAL

## 2021-03-25 DIAGNOSIS — Z23 NEED FOR VACCINATION: Primary | ICD-10-CM

## 2021-03-25 PROCEDURE — 91301 COVID-19, MRNA, LNP-S, PF, 100 MCG/0.5 ML DOSE VACCINE: CPT | Mod: S$GLB,,, | Performed by: FAMILY MEDICINE

## 2021-03-25 PROCEDURE — 91301 COVID-19, MRNA, LNP-S, PF, 100 MCG/0.5 ML DOSE VACCINE: ICD-10-PCS | Mod: S$GLB,,, | Performed by: FAMILY MEDICINE

## 2021-03-25 PROCEDURE — 0012A COVID-19, MRNA, LNP-S, PF, 100 MCG/0.5 ML DOSE VACCINE: ICD-10-PCS | Mod: CV19,S$GLB,, | Performed by: FAMILY MEDICINE

## 2021-03-25 PROCEDURE — 0012A COVID-19, MRNA, LNP-S, PF, 100 MCG/0.5 ML DOSE VACCINE: CPT | Mod: CV19,S$GLB,, | Performed by: FAMILY MEDICINE

## 2021-05-30 ENCOUNTER — PATIENT MESSAGE (OUTPATIENT)
Dept: FAMILY MEDICINE | Facility: CLINIC | Age: 56
End: 2021-05-30

## 2021-06-06 RX ORDER — LEVOTHYROXINE SODIUM 50 UG/1
TABLET ORAL
Qty: 135 TABLET | Refills: 0 | Status: SHIPPED | OUTPATIENT
Start: 2021-06-06 | End: 2021-08-28

## 2021-06-17 ENCOUNTER — HOSPITAL ENCOUNTER (OUTPATIENT)
Dept: RADIOLOGY | Facility: HOSPITAL | Age: 56
Discharge: HOME | End: 2021-06-17
Attending: INTERNAL MEDICINE
Payer: COMMERCIAL

## 2021-06-17 DIAGNOSIS — R92.8 ABNORMAL MAMMOGRAM: ICD-10-CM

## 2021-06-17 PROCEDURE — 77061 BREAST TOMOSYNTHESIS UNI: CPT | Mod: 26,RT,, | Performed by: RADIOLOGY

## 2021-06-17 PROCEDURE — 77065 MAMMO DIGITAL DIAGNOSTIC RIGHT WITH TOMO: ICD-10-PCS | Mod: 26,RT,, | Performed by: RADIOLOGY

## 2021-06-17 PROCEDURE — 77065 DX MAMMO INCL CAD UNI: CPT | Mod: 26,RT,, | Performed by: RADIOLOGY

## 2021-06-17 PROCEDURE — 77061 BREAST TOMOSYNTHESIS UNI: CPT | Mod: TC,PO,RT

## 2021-06-17 PROCEDURE — 77061 MAMMO DIGITAL DIAGNOSTIC RIGHT WITH TOMO: ICD-10-PCS | Mod: 26,RT,, | Performed by: RADIOLOGY

## 2021-06-18 ENCOUNTER — TELEPHONE (OUTPATIENT)
Dept: FAMILY MEDICINE | Facility: CLINIC | Age: 56
End: 2021-06-18

## 2021-06-18 DIAGNOSIS — R92.8 ABNORMAL MAMMOGRAM: Primary | ICD-10-CM

## 2021-07-07 ENCOUNTER — OFFICE VISIT (OUTPATIENT)
Dept: OPTOMETRY | Facility: CLINIC | Age: 56
End: 2021-07-07
Payer: COMMERCIAL

## 2021-07-07 DIAGNOSIS — H43.393 VITREOUS FLOATERS, BILATERAL: Primary | ICD-10-CM

## 2021-07-07 DIAGNOSIS — Z13.5 GLAUCOMA SCREENING: ICD-10-CM

## 2021-07-07 DIAGNOSIS — H52.4 MYOPIA WITH ASTIGMATISM AND PRESBYOPIA, BILATERAL: ICD-10-CM

## 2021-07-07 DIAGNOSIS — H52.13 MYOPIA WITH ASTIGMATISM AND PRESBYOPIA, BILATERAL: ICD-10-CM

## 2021-07-07 DIAGNOSIS — H52.203 MYOPIA WITH ASTIGMATISM AND PRESBYOPIA, BILATERAL: ICD-10-CM

## 2021-07-07 PROCEDURE — 99999 PR PBB SHADOW E&M-EST. PATIENT-LVL II: ICD-10-PCS | Mod: PBBFAC,,, | Performed by: OPTOMETRIST

## 2021-07-07 PROCEDURE — 92015 PR REFRACTION: ICD-10-PCS | Mod: S$GLB,,, | Performed by: OPTOMETRIST

## 2021-07-07 PROCEDURE — 92015 DETERMINE REFRACTIVE STATE: CPT | Mod: S$GLB,,, | Performed by: OPTOMETRIST

## 2021-07-07 PROCEDURE — 92004 COMPRE OPH EXAM NEW PT 1/>: CPT | Mod: S$GLB,,, | Performed by: OPTOMETRIST

## 2021-07-07 PROCEDURE — 92004 PR EYE EXAM, NEW PATIENT,COMPREHESV: ICD-10-PCS | Mod: S$GLB,,, | Performed by: OPTOMETRIST

## 2021-07-07 PROCEDURE — 99999 PR PBB SHADOW E&M-EST. PATIENT-LVL II: CPT | Mod: PBBFAC,,, | Performed by: OPTOMETRIST

## 2021-08-31 DIAGNOSIS — M47.812 OSTEOARTHRITIS OF CERVICAL AND LUMBAR SPINE: ICD-10-CM

## 2021-08-31 DIAGNOSIS — M47.816 OSTEOARTHRITIS OF CERVICAL AND LUMBAR SPINE: ICD-10-CM

## 2021-08-31 RX ORDER — MELOXICAM 15 MG/1
TABLET ORAL
Qty: 90 TABLET | Refills: 0 | Status: SHIPPED | OUTPATIENT
Start: 2021-08-31 | End: 2021-11-12

## 2021-09-21 ENCOUNTER — NURSE TRIAGE (OUTPATIENT)
Dept: ADMINISTRATIVE | Facility: CLINIC | Age: 56
End: 2021-09-21

## 2021-09-24 ENCOUNTER — TELEPHONE (OUTPATIENT)
Dept: ADMINISTRATIVE | Facility: HOSPITAL | Age: 56
End: 2021-09-24

## 2021-10-12 ENCOUNTER — TELEPHONE (OUTPATIENT)
Dept: FAMILY MEDICINE | Facility: CLINIC | Age: 56
End: 2021-10-12

## 2021-10-15 ENCOUNTER — OFFICE VISIT (OUTPATIENT)
Dept: FAMILY MEDICINE | Facility: CLINIC | Age: 56
End: 2021-10-15
Payer: COMMERCIAL

## 2021-10-15 VITALS
OXYGEN SATURATION: 100 % | HEIGHT: 63 IN | HEART RATE: 71 BPM | BODY MASS INDEX: 31.25 KG/M2 | TEMPERATURE: 97 F | DIASTOLIC BLOOD PRESSURE: 82 MMHG | SYSTOLIC BLOOD PRESSURE: 122 MMHG | WEIGHT: 176.38 LBS | RESPIRATION RATE: 14 BRPM

## 2021-10-15 DIAGNOSIS — F31.81 BIPOLAR 2 DISORDER: ICD-10-CM

## 2021-10-15 DIAGNOSIS — F41.1 GAD (GENERALIZED ANXIETY DISORDER): ICD-10-CM

## 2021-10-15 DIAGNOSIS — R91.1 LUNG NODULE: ICD-10-CM

## 2021-10-15 DIAGNOSIS — F33.1 MAJOR DEPRESSIVE DISORDER, RECURRENT EPISODE, MODERATE: ICD-10-CM

## 2021-10-15 DIAGNOSIS — Z23 NEED FOR SHINGLES VACCINE: ICD-10-CM

## 2021-10-15 DIAGNOSIS — I25.10 CORONARY ARTERY DISEASE INVOLVING NATIVE CORONARY ARTERY OF NATIVE HEART WITHOUT ANGINA PECTORIS: ICD-10-CM

## 2021-10-15 DIAGNOSIS — G43.009 MIGRAINE WITHOUT AURA AND WITHOUT STATUS MIGRAINOSUS, NOT INTRACTABLE: ICD-10-CM

## 2021-10-15 DIAGNOSIS — M47.812 OSTEOARTHRITIS OF CERVICAL AND LUMBAR SPINE: ICD-10-CM

## 2021-10-15 DIAGNOSIS — M47.816 OSTEOARTHRITIS OF CERVICAL AND LUMBAR SPINE: ICD-10-CM

## 2021-10-15 DIAGNOSIS — Z00.00 WELL ADULT EXAM: Primary | ICD-10-CM

## 2021-10-15 DIAGNOSIS — F43.10 PTSD (POST-TRAUMATIC STRESS DISORDER): ICD-10-CM

## 2021-10-15 DIAGNOSIS — E78.5 HYPERLIPIDEMIA, UNSPECIFIED HYPERLIPIDEMIA TYPE: ICD-10-CM

## 2021-10-15 DIAGNOSIS — F41.0 PANIC ATTACK: ICD-10-CM

## 2021-10-15 DIAGNOSIS — I10 ESSENTIAL HYPERTENSION: ICD-10-CM

## 2021-10-15 DIAGNOSIS — E03.9 HYPOTHYROIDISM, UNSPECIFIED TYPE: ICD-10-CM

## 2021-10-15 DIAGNOSIS — Z23 NEED FOR IMMUNIZATION AGAINST INFLUENZA: ICD-10-CM

## 2021-10-15 DIAGNOSIS — F51.04 PSYCHOPHYSIOLOGICAL INSOMNIA: ICD-10-CM

## 2021-10-15 LAB
ALBUMIN SERPL BCP-MCNC: 4.4 G/DL (ref 3.5–5.2)
ALP SERPL-CCNC: 86 U/L (ref 55–135)
ALT SERPL W/O P-5'-P-CCNC: 33 U/L (ref 10–44)
ANION GAP SERPL CALC-SCNC: 14 MMOL/L (ref 8–16)
AST SERPL-CCNC: 30 U/L (ref 10–40)
BASOPHILS # BLD AUTO: 0.02 K/UL (ref 0–0.2)
BASOPHILS NFR BLD: 0.3 % (ref 0–1.9)
BILIRUB SERPL-MCNC: 1 MG/DL (ref 0.1–1)
BUN SERPL-MCNC: 13 MG/DL (ref 6–20)
CALCIUM SERPL-MCNC: 9.8 MG/DL (ref 8.7–10.5)
CHLORIDE SERPL-SCNC: 104 MMOL/L (ref 95–110)
CHOLEST SERPL-MCNC: 173 MG/DL (ref 120–199)
CHOLEST/HDLC SERPL: 4.1 {RATIO} (ref 2–5)
CO2 SERPL-SCNC: 23 MMOL/L (ref 23–29)
CREAT SERPL-MCNC: 1 MG/DL (ref 0.5–1.4)
DIFFERENTIAL METHOD: NORMAL
EOSINOPHIL # BLD AUTO: 0.2 K/UL (ref 0–0.5)
EOSINOPHIL NFR BLD: 2.7 % (ref 0–8)
ERYTHROCYTE [DISTWIDTH] IN BLOOD BY AUTOMATED COUNT: 12.5 % (ref 11.5–14.5)
EST. GFR  (AFRICAN AMERICAN): >60 ML/MIN/1.73 M^2
EST. GFR  (NON AFRICAN AMERICAN): >60 ML/MIN/1.73 M^2
ESTIMATED AVG GLUCOSE: 108 MG/DL (ref 68–131)
GLUCOSE SERPL-MCNC: 92 MG/DL (ref 70–110)
HBA1C MFR BLD: 5.4 % (ref 4–5.6)
HCT VFR BLD AUTO: 44.8 % (ref 37–48.5)
HDLC SERPL-MCNC: 42 MG/DL (ref 40–75)
HDLC SERPL: 24.3 % (ref 20–50)
HGB BLD-MCNC: 14.8 G/DL (ref 12–16)
IMM GRANULOCYTES # BLD AUTO: 0.02 K/UL (ref 0–0.04)
IMM GRANULOCYTES NFR BLD AUTO: 0.3 % (ref 0–0.5)
LDLC SERPL CALC-MCNC: 79.8 MG/DL (ref 63–159)
LYMPHOCYTES # BLD AUTO: 1.3 K/UL (ref 1–4.8)
LYMPHOCYTES NFR BLD: 18.5 % (ref 18–48)
MCH RBC QN AUTO: 29.8 PG (ref 27–31)
MCHC RBC AUTO-ENTMCNC: 33 G/DL (ref 32–36)
MCV RBC AUTO: 90 FL (ref 82–98)
MONOCYTES # BLD AUTO: 0.6 K/UL (ref 0.3–1)
MONOCYTES NFR BLD: 8.2 % (ref 4–15)
NEUTROPHILS # BLD AUTO: 4.9 K/UL (ref 1.8–7.7)
NEUTROPHILS NFR BLD: 70 % (ref 38–73)
NONHDLC SERPL-MCNC: 131 MG/DL
NRBC BLD-RTO: 0 /100 WBC
PLATELET # BLD AUTO: 243 K/UL (ref 150–450)
PMV BLD AUTO: 11.8 FL (ref 9.2–12.9)
POTASSIUM SERPL-SCNC: 4.1 MMOL/L (ref 3.5–5.1)
PROT SERPL-MCNC: 7.4 G/DL (ref 6–8.4)
RBC # BLD AUTO: 4.96 M/UL (ref 4–5.4)
SODIUM SERPL-SCNC: 141 MMOL/L (ref 136–145)
TRIGL SERPL-MCNC: 256 MG/DL (ref 30–150)
TSH SERPL DL<=0.005 MIU/L-ACNC: 2.57 UIU/ML (ref 0.4–4)
WBC # BLD AUTO: 6.99 K/UL (ref 3.9–12.7)

## 2021-10-15 PROCEDURE — 90471 IMMUNIZATION ADMIN: CPT | Mod: S$GLB,,, | Performed by: INTERNAL MEDICINE

## 2021-10-15 PROCEDURE — 3079F DIAST BP 80-89 MM HG: CPT | Mod: CPTII,S$GLB,, | Performed by: INTERNAL MEDICINE

## 2021-10-15 PROCEDURE — 90750 ZOSTER RECOMBINANT VACCINE: ICD-10-PCS | Mod: S$GLB,,, | Performed by: INTERNAL MEDICINE

## 2021-10-15 PROCEDURE — 1159F MED LIST DOCD IN RCRD: CPT | Mod: CPTII,S$GLB,, | Performed by: INTERNAL MEDICINE

## 2021-10-15 PROCEDURE — 3074F SYST BP LT 130 MM HG: CPT | Mod: CPTII,S$GLB,, | Performed by: INTERNAL MEDICINE

## 2021-10-15 PROCEDURE — 1159F PR MEDICATION LIST DOCUMENTED IN MEDICAL RECORD: ICD-10-PCS | Mod: CPTII,S$GLB,, | Performed by: INTERNAL MEDICINE

## 2021-10-15 PROCEDURE — 1160F RVW MEDS BY RX/DR IN RCRD: CPT | Mod: CPTII,S$GLB,, | Performed by: INTERNAL MEDICINE

## 2021-10-15 PROCEDURE — 99396 PREV VISIT EST AGE 40-64: CPT | Mod: 25,S$GLB,, | Performed by: INTERNAL MEDICINE

## 2021-10-15 PROCEDURE — 1160F PR REVIEW ALL MEDS BY PRESCRIBER/CLIN PHARMACIST DOCUMENTED: ICD-10-PCS | Mod: CPTII,S$GLB,, | Performed by: INTERNAL MEDICINE

## 2021-10-15 PROCEDURE — 3079F PR MOST RECENT DIASTOLIC BLOOD PRESSURE 80-89 MM HG: ICD-10-PCS | Mod: CPTII,S$GLB,, | Performed by: INTERNAL MEDICINE

## 2021-10-15 PROCEDURE — 3008F PR BODY MASS INDEX (BMI) DOCUMENTED: ICD-10-PCS | Mod: CPTII,S$GLB,, | Performed by: INTERNAL MEDICINE

## 2021-10-15 PROCEDURE — 80061 LIPID PANEL: CPT | Performed by: INTERNAL MEDICINE

## 2021-10-15 PROCEDURE — 99396 PR PREVENTIVE VISIT,EST,40-64: ICD-10-PCS | Mod: 25,S$GLB,, | Performed by: INTERNAL MEDICINE

## 2021-10-15 PROCEDURE — 87389 HIV-1 AG W/HIV-1&-2 AB AG IA: CPT | Performed by: INTERNAL MEDICINE

## 2021-10-15 PROCEDURE — 90472 IMMUNIZATION ADMIN EACH ADD: CPT | Mod: S$GLB,,, | Performed by: INTERNAL MEDICINE

## 2021-10-15 PROCEDURE — 84443 ASSAY THYROID STIM HORMONE: CPT | Performed by: INTERNAL MEDICINE

## 2021-10-15 PROCEDURE — 90686 FLU VACCINE (QUAD) GREATER THAN OR EQUAL TO 3YO PRESERVATIVE FREE IM: ICD-10-PCS | Mod: S$GLB,,, | Performed by: INTERNAL MEDICINE

## 2021-10-15 PROCEDURE — 80053 COMPREHEN METABOLIC PANEL: CPT | Performed by: INTERNAL MEDICINE

## 2021-10-15 PROCEDURE — 83036 HEMOGLOBIN GLYCOSYLATED A1C: CPT | Performed by: INTERNAL MEDICINE

## 2021-10-15 PROCEDURE — 90471 FLU VACCINE (QUAD) GREATER THAN OR EQUAL TO 3YO PRESERVATIVE FREE IM: ICD-10-PCS | Mod: S$GLB,,, | Performed by: INTERNAL MEDICINE

## 2021-10-15 PROCEDURE — 3074F PR MOST RECENT SYSTOLIC BLOOD PRESSURE < 130 MM HG: ICD-10-PCS | Mod: CPTII,S$GLB,, | Performed by: INTERNAL MEDICINE

## 2021-10-15 PROCEDURE — 90750 HZV VACC RECOMBINANT IM: CPT | Mod: S$GLB,,, | Performed by: INTERNAL MEDICINE

## 2021-10-15 PROCEDURE — 3008F BODY MASS INDEX DOCD: CPT | Mod: CPTII,S$GLB,, | Performed by: INTERNAL MEDICINE

## 2021-10-15 PROCEDURE — 90686 IIV4 VACC NO PRSV 0.5 ML IM: CPT | Mod: S$GLB,,, | Performed by: INTERNAL MEDICINE

## 2021-10-15 PROCEDURE — 85025 COMPLETE CBC W/AUTO DIFF WBC: CPT | Performed by: INTERNAL MEDICINE

## 2021-10-15 PROCEDURE — 90472 ZOSTER RECOMBINANT VACCINE: ICD-10-PCS | Mod: S$GLB,,, | Performed by: INTERNAL MEDICINE

## 2021-10-15 RX ORDER — CETIRIZINE HYDROCHLORIDE 10 MG/1
10 TABLET ORAL DAILY
COMMUNITY

## 2021-10-15 RX ORDER — ASPIRIN 81 MG/1
81 TABLET ORAL DAILY
Refills: 0
Start: 2021-10-15 | End: 2024-03-20

## 2021-10-18 ENCOUNTER — PATIENT MESSAGE (OUTPATIENT)
Dept: FAMILY MEDICINE | Facility: CLINIC | Age: 56
End: 2021-10-18

## 2021-10-18 LAB — HIV 1+2 AB+HIV1 P24 AG SERPL QL IA: NEGATIVE

## 2022-01-14 ENCOUNTER — TELEPHONE (OUTPATIENT)
Dept: FAMILY MEDICINE | Facility: CLINIC | Age: 57
End: 2022-01-14
Payer: COMMERCIAL

## 2022-01-14 NOTE — TELEPHONE ENCOUNTER
Called patient to schedule diag mammo and ultrasound, patient states she will be out of town the entire month of February, scheduled per her request on 3/8/22 at 11 am.

## 2022-01-24 NOTE — TELEPHONE ENCOUNTER
No new care gaps identified.  Powered by [employer]. Reference number: [id number].   1/24/2022 12:50:23 PM CST

## 2022-01-26 ENCOUNTER — PATIENT MESSAGE (OUTPATIENT)
Dept: FAMILY MEDICINE | Facility: CLINIC | Age: 57
End: 2022-01-26
Payer: COMMERCIAL

## 2022-01-26 RX ORDER — QUETIAPINE FUMARATE 50 MG/1
TABLET, FILM COATED ORAL
Qty: 90 TABLET | Refills: 1 | Status: SHIPPED | OUTPATIENT
Start: 2022-01-26 | End: 2022-04-07

## 2022-03-08 ENCOUNTER — HOSPITAL ENCOUNTER (OUTPATIENT)
Dept: RADIOLOGY | Facility: HOSPITAL | Age: 57
Discharge: HOME | End: 2022-03-08
Attending: INTERNAL MEDICINE
Payer: COMMERCIAL

## 2022-03-08 DIAGNOSIS — R92.8 ABNORMAL MAMMOGRAM: ICD-10-CM

## 2022-03-08 PROCEDURE — 77066 DX MAMMO INCL CAD BI: CPT | Mod: 26,,, | Performed by: RADIOLOGY

## 2022-03-08 PROCEDURE — 77066 MAMMO DIGITAL DIAGNOSTIC BILAT WITH TOMO: ICD-10-PCS | Mod: 26,,, | Performed by: RADIOLOGY

## 2022-03-08 PROCEDURE — 77066 DX MAMMO INCL CAD BI: CPT | Mod: TC,PO

## 2022-03-08 PROCEDURE — 77062 BREAST TOMOSYNTHESIS BI: CPT | Mod: 26,,, | Performed by: RADIOLOGY

## 2022-03-08 PROCEDURE — 77062 MAMMO DIGITAL DIAGNOSTIC BILAT WITH TOMO: ICD-10-PCS | Mod: 26,,, | Performed by: RADIOLOGY

## 2022-03-20 DIAGNOSIS — M47.812 OSTEOARTHRITIS OF CERVICAL AND LUMBAR SPINE: ICD-10-CM

## 2022-03-20 DIAGNOSIS — M47.816 OSTEOARTHRITIS OF CERVICAL AND LUMBAR SPINE: ICD-10-CM

## 2022-03-21 RX ORDER — MELOXICAM 15 MG/1
TABLET ORAL
Qty: 90 TABLET | Refills: 1 | Status: SHIPPED | OUTPATIENT
Start: 2022-03-21 | End: 2022-09-17

## 2022-03-24 ENCOUNTER — PATIENT OUTREACH (OUTPATIENT)
Dept: ADMINISTRATIVE | Facility: OTHER | Age: 57
End: 2022-03-24
Payer: COMMERCIAL

## 2022-03-25 NOTE — PROGRESS NOTES
Health Maintenance Due   Topic Date Due    COVID-19 Vaccine (3 - Booster for Moderna series) 08/25/2021    Shingles Vaccine (2 of 2) 12/10/2021     Updates were requested from care everywhere.  Chart was reviewed for overdue Proactive [facility] Encounters (RANJAN) topics (CRS, Breast Cancer Screening, Eye exam)  Health Maintenance has been updated.  LINKS immunization registry triggered.  Immunizations were reconciled.

## 2022-04-07 ENCOUNTER — OFFICE VISIT (OUTPATIENT)
Dept: FAMILY MEDICINE | Facility: CLINIC | Age: 57
End: 2022-04-07
Payer: COMMERCIAL

## 2022-04-07 VITALS
DIASTOLIC BLOOD PRESSURE: 90 MMHG | OXYGEN SATURATION: 95 % | WEIGHT: 186.63 LBS | SYSTOLIC BLOOD PRESSURE: 126 MMHG | TEMPERATURE: 98 F | RESPIRATION RATE: 20 BRPM | HEIGHT: 63 IN | BODY MASS INDEX: 33.07 KG/M2 | HEART RATE: 72 BPM

## 2022-04-07 DIAGNOSIS — M54.50 ACUTE LEFT-SIDED LOW BACK PAIN WITHOUT SCIATICA: ICD-10-CM

## 2022-04-07 DIAGNOSIS — F43.10 PTSD (POST-TRAUMATIC STRESS DISORDER): ICD-10-CM

## 2022-04-07 DIAGNOSIS — E78.5 HYPERLIPIDEMIA, UNSPECIFIED HYPERLIPIDEMIA TYPE: ICD-10-CM

## 2022-04-07 DIAGNOSIS — G43.009 MIGRAINE WITHOUT AURA AND WITHOUT STATUS MIGRAINOSUS, NOT INTRACTABLE: ICD-10-CM

## 2022-04-07 DIAGNOSIS — I25.10 CORONARY ARTERY DISEASE INVOLVING NATIVE CORONARY ARTERY OF NATIVE HEART WITHOUT ANGINA PECTORIS: Primary | ICD-10-CM

## 2022-04-07 DIAGNOSIS — M47.816 OSTEOARTHRITIS OF CERVICAL AND LUMBAR SPINE: ICD-10-CM

## 2022-04-07 DIAGNOSIS — F41.1 GAD (GENERALIZED ANXIETY DISORDER): ICD-10-CM

## 2022-04-07 DIAGNOSIS — F31.81 BIPOLAR 2 DISORDER: ICD-10-CM

## 2022-04-07 DIAGNOSIS — I10 ESSENTIAL HYPERTENSION: ICD-10-CM

## 2022-04-07 DIAGNOSIS — F33.1 MAJOR DEPRESSIVE DISORDER, RECURRENT EPISODE, MODERATE: ICD-10-CM

## 2022-04-07 DIAGNOSIS — F51.04 PSYCHOPHYSIOLOGICAL INSOMNIA: ICD-10-CM

## 2022-04-07 DIAGNOSIS — M47.812 OSTEOARTHRITIS OF CERVICAL AND LUMBAR SPINE: ICD-10-CM

## 2022-04-07 DIAGNOSIS — R91.1 LUNG NODULE: ICD-10-CM

## 2022-04-07 DIAGNOSIS — F41.0 PANIC ATTACK: ICD-10-CM

## 2022-04-07 DIAGNOSIS — E03.9 HYPOTHYROIDISM, UNSPECIFIED TYPE: ICD-10-CM

## 2022-04-07 PROCEDURE — 1159F PR MEDICATION LIST DOCUMENTED IN MEDICAL RECORD: ICD-10-PCS | Mod: CPTII,S$GLB,, | Performed by: INTERNAL MEDICINE

## 2022-04-07 PROCEDURE — 99214 OFFICE O/P EST MOD 30 MIN: CPT | Mod: 25,S$GLB,, | Performed by: INTERNAL MEDICINE

## 2022-04-07 PROCEDURE — 1160F PR REVIEW ALL MEDS BY PRESCRIBER/CLIN PHARMACIST DOCUMENTED: ICD-10-PCS | Mod: CPTII,S$GLB,, | Performed by: INTERNAL MEDICINE

## 2022-04-07 PROCEDURE — 3080F PR MOST RECENT DIASTOLIC BLOOD PRESSURE >= 90 MM HG: ICD-10-PCS | Mod: CPTII,S$GLB,, | Performed by: INTERNAL MEDICINE

## 2022-04-07 PROCEDURE — 96372 PR INJECTION,THERAP/PROPH/DIAG2ST, IM OR SUBCUT: ICD-10-PCS | Mod: S$GLB,,, | Performed by: INTERNAL MEDICINE

## 2022-04-07 PROCEDURE — 1160F RVW MEDS BY RX/DR IN RCRD: CPT | Mod: CPTII,S$GLB,, | Performed by: INTERNAL MEDICINE

## 2022-04-07 PROCEDURE — 1159F MED LIST DOCD IN RCRD: CPT | Mod: CPTII,S$GLB,, | Performed by: INTERNAL MEDICINE

## 2022-04-07 PROCEDURE — 3074F PR MOST RECENT SYSTOLIC BLOOD PRESSURE < 130 MM HG: ICD-10-PCS | Mod: CPTII,S$GLB,, | Performed by: INTERNAL MEDICINE

## 2022-04-07 PROCEDURE — 99214 PR OFFICE/OUTPT VISIT, EST, LEVL IV, 30-39 MIN: ICD-10-PCS | Mod: 25,S$GLB,, | Performed by: INTERNAL MEDICINE

## 2022-04-07 PROCEDURE — 3008F PR BODY MASS INDEX (BMI) DOCUMENTED: ICD-10-PCS | Mod: CPTII,S$GLB,, | Performed by: INTERNAL MEDICINE

## 2022-04-07 PROCEDURE — 3080F DIAST BP >= 90 MM HG: CPT | Mod: CPTII,S$GLB,, | Performed by: INTERNAL MEDICINE

## 2022-04-07 PROCEDURE — 3074F SYST BP LT 130 MM HG: CPT | Mod: CPTII,S$GLB,, | Performed by: INTERNAL MEDICINE

## 2022-04-07 PROCEDURE — 3008F BODY MASS INDEX DOCD: CPT | Mod: CPTII,S$GLB,, | Performed by: INTERNAL MEDICINE

## 2022-04-07 PROCEDURE — 96372 THER/PROPH/DIAG INJ SC/IM: CPT | Mod: S$GLB,,, | Performed by: INTERNAL MEDICINE

## 2022-04-07 RX ORDER — GABAPENTIN 300 MG/1
CAPSULE ORAL
COMMUNITY
End: 2023-06-05 | Stop reason: SDUPTHER

## 2022-04-07 RX ORDER — KETOROLAC TROMETHAMINE 30 MG/ML
30 INJECTION, SOLUTION INTRAMUSCULAR; INTRAVENOUS
Status: COMPLETED | OUTPATIENT
Start: 2022-04-07 | End: 2022-04-07

## 2022-04-07 RX ORDER — SUMATRIPTAN SUCCINATE 100 MG/1
TABLET ORAL
COMMUNITY
End: 2022-10-15

## 2022-04-07 RX ORDER — KETOROLAC TROMETHAMINE 10 MG/1
10 TABLET, FILM COATED ORAL EVERY 8 HOURS PRN
Qty: 30 TABLET | Refills: 2 | Status: SHIPPED | OUTPATIENT
Start: 2022-04-07 | End: 2022-10-11

## 2022-04-07 RX ADMIN — KETOROLAC TROMETHAMINE 30 MG: 30 INJECTION, SOLUTION INTRAMUSCULAR; INTRAVENOUS at 09:04

## 2022-04-07 NOTE — PROGRESS NOTES
Subjective:       Patient ID: Delaney Noel is a 57 y.o. female.    Medication List with Changes/Refills   New Medications    KETOROLAC (TORADOL) 10 MG TABLET    Take 1 tablet (10 mg total) by mouth every 8 (eight) hours as needed for Pain (migraine onset).   Current Medications    ALBUTEROL (PROVENTIL/VENTOLIN HFA) 90 MCG/ACTUATION INHALER    INHALE 2 PUFFS INTO THE LUNGS EVERY 6 (SIX) HOURS AS NEEDED FOR WHEEZING. RESCUE    ASPIRIN (ECOTRIN) 81 MG EC TABLET    Take 1 tablet (81 mg total) by mouth once daily.    ATENOLOL (TENORMIN) 50 MG TABLET        CETIRIZINE (ZYRTEC) 10 MG TABLET    Take 10 mg by mouth once daily. Take one(1) tab by mouth daily    CLONAZEPAM (KLONOPIN) 0.5 MG TABLET    Take 1 mg by mouth once daily.     EMGALITY  MG/ML PNIJ        ESCITALOPRAM OXALATE (LEXAPRO) 20 MG TABLET    30 mg once daily.    GABAPENTIN (NEURONTIN) 300 MG CAPSULE    gabapentin 300 mg capsule   TAKE 1 CAPSULE BY MOUTH EVERYDAY AT BEDTIME    IBUPROFEN (ADVIL,MOTRIN) 800 MG TABLET    Take 800 mg by mouth 3 (three) times daily as needed.     LAMOTRIGINE (LAMICTAL) 25 MG TABLET    75 mg once daily. Take three tabs(75mg) by mouth daily    LEVOTHYROXINE (SYNTHROID) 50 MCG TABLET    TAKE 1 TABLET BY MOUTH DAILY FOR 3 DAYS THEN ON 4 TH DAY TAKE 1 AND 1/2 TABLETS BY MOUTH DAILY    LEVOTHYROXINE (SYNTHROID) 75 MCG TABLET    TAKE 1 TABLET ONCE A DAY ON MONDAYS & WEDNESDAYS AND FRIDAYS    MELOXICAM (MOBIC) 15 MG TABLET    TAKE 1 TABLET BY MOUTH EVERY DAY    METHOCARBAMOL (ROBAXIN) 750 MG TAB        ROSUVASTATIN (CRESTOR) 10 MG TABLET    Take 10 mg by mouth once daily.     SUMATRIPTAN (IMITREX) 100 MG TABLET    Imitrex 100 mg tablet   1 tablet as needed; 100 MG; Twice a day    TERBINAFINE HCL (LAMISIL) 1 % CREAM    Apply topically 2 (two) times daily.   Discontinued Medications    QUETIAPINE (SEROQUEL) 100 MG TAB    Take 50 mg by mouth once daily.     QUETIAPINE (SEROQUEL) 50 MG TABLET    TAKE 1 TABLET BY MOUTH EVERYDAY AT  "BEDTIME       Chief Complaint: Follow-up  She is here today to f/u on chronic medical issues      She has hypertension and is taking atenolol 50 mg qday.  She had a positive stress test and then subsequent cath (do not have records) and she reports she had non-obstructive CAD. She denies chest pain or shortness of breath. No lower leg swelling. She does report "carotid disease".        She has hyperlipidemia and is taking crestor 10 mg qday. Her last lipids on 10/2021 were 173/256/42/79.  She is on aspirin daily.        She has hypothyroid and is taking levothyroxine 50 mcg qday on Tues-Thurs-Sat and Sun and 75 mcg qday on Mon-Wed-Fri.  Her TSH was normal on 10/2021   She did have a thyroid u/s on 11/2018 showing thyroiditis and unchanged from previous.      She has a small 4 mm lung nodule seen on CT on 7/2014. Repeat CT on 12/2019 showed on changes.      She has fatty liver seen on CT on 12/2019.      She has chronic migraines and is followed by neurology.  She does report increasing migraines since she ran out of nurtec (not covered by insurance). Symptoms associated with migraines are sensitive to light with nausea, dizziness and confusion. She is taking emgality with some relief.       She has multiple psychiatric issues. She has major depression with suicidal ideations (recent hospitalization for suicidal thoughts on 12/3/2019), anxiety with panic attack, bipolar type 2, PTSD with insomnia and OCD.  She continues to follow with psychiatry. She continues on klonopin 0.5 mg bid, lamictal 100 mg daily and lexapro 30 mg daily. She feels this is helping her symptoms and denies any suicidal ideations at this time.      She has chronic low back pain since 10/2017. Around the time it was diagnosed she was having pain down her right leg and trouble walking.  She had an MRI of lumbar and cervical spine in 1/2019 showing mild spondylotic changes in L4-L5, L5-S1 and mild disease in cervical vertebra. She sent to PT which " she feels helped with her symptoms. She is taking mobic 15 mg qam and robaxin 750 mg qhs PRN muscle spasms.  She reports pain worse with prolonged sitting or driving, and better with moving, home exercises, mobic and gabapentin.  Rated 1/10 to 10/10.  No weakness but her pain does radiate down her right leg.  She was loading her RV last evening and started with left sided back pain with muscles spasms. She was unable to walk. Today she is feeling a little better but still having left sided low back pain. No radiation. No weakness. Better with rest. She is using gabapentin and muscle relaxer she has at home.      She lives with her  and feels safe at home. She does not exercise.  She is no longer working. She enjoys traveling in her RV.      Colonoscopy---7/2020  Mammogram----3/2022 neg   Pap-----12/2017 neg HPV neg---JAMIE  Tdap---9/2019  Influenza vaccine-----10/2021   Pneumovax 23----none  Shingles vaccine-----10/2021  covid vaccine----UTD x 2 doses     Review of Systems   Constitutional: Negative for appetite change, fatigue, fever and unexpected weight change.   HENT: Negative for congestion, ear pain, hearing loss, sore throat and trouble swallowing.    Eyes: Negative for pain and visual disturbance.   Respiratory: Negative for cough, chest tightness, shortness of breath and wheezing.    Cardiovascular: Negative for chest pain, palpitations and leg swelling.   Gastrointestinal: Positive for constipation and diarrhea. Negative for abdominal pain, blood in stool, nausea and vomiting.   Endocrine: Negative for polyuria.   Genitourinary: Negative for dysuria and hematuria.   Musculoskeletal: Positive for arthralgias and back pain. Negative for myalgias.   Skin: Negative for rash.   Allergic/Immunologic: Positive for environmental allergies.   Neurological: Positive for headaches. Negative for dizziness, weakness and numbness.   Hematological: Does not bruise/bleed easily.   Psychiatric/Behavioral: Positive for  "dysphoric mood and sleep disturbance. Negative for suicidal ideas. The patient is not nervous/anxious.        Objective:      Vitals:    04/07/22 0919   BP: (!) 126/90   Pulse: 72   Resp: 20   Temp: 97.9 °F (36.6 °C)   SpO2: 95%   Weight: 84.6 kg (186 lb 9.9 oz)   Height: 5' 3" (1.6 m)     Body mass index is 33.06 kg/m².  Physical Exam    General appearance: No acute distress, cooperative  Eyes: PERRL, EOMI, conjunctiva clear  Ears: normal external ear and pinna, tm clear without drainage, canals clear  Nose: Normal mucosa without drainage  Throat: no exudates or erythema, tonsils not enlarged  Mouth: no sores or lesions, moist mucous membranes  Neck: FROM, soft, supple, no thyromegaly, no bruits  Lymph: no anterior or posterior cervical adenopathy  Heart::  Regular rate and rhythm, no murmur  Lung: Clear to ascultation bilaterally, no wheezing, no rales, no rhonchi, no distress  Abdomen: Soft, nontender, no distention, no hepatosplenomegaly, bowel sounds normal, no guarding, no rebound, no peritoneal signs  Skin: no rashes, no lesions  Extremities: no edema, no cyanosis  Neuro: CN 2-12 intact, 5/5 muscle strength upper and lower extremity bilaterally, 2+ DTRs UE and LE bilaterally, normal gait  Peripheral pulses: 2+ pedal pulses bilaterally, good perfusion and color  Musculoskeletal: FROM, good strenth,focal tenderness on palpation of left lower back  Joint: normal appearance, no swelling, no warmth, no deformity in all joints    Assessment:       1. Coronary artery disease involving native coronary artery of native heart without angina pectoris    2. Essential hypertension    3. Hyperlipidemia, unspecified hyperlipidemia type    4. Lung nodule    5. Hypothyroidism, unspecified type    6. Migraine without aura and without status migrainosus, not intractable    7. Major depressive disorder, recurrent episode, moderate    8. Bipolar 2 disorder    9. PTSD (post-traumatic stress disorder)    10. HUNTER (generalized " anxiety disorder)    11. Panic attack    12. Psychophysiological insomnia    13. Acute left-sided low back pain without sciatica    14. Osteoarthritis of cervical and lumbar spine        Plan:       Coronary artery disease involving native coronary artery of native heart without angina pectoris  Stable and no active symptoms.    Essential hypertension  Well controlled and continue current regimen.   -     CBC Auto Differential; Future; Expected date: 04/07/2022  -     Comprehensive Metabolic Panel; Future; Expected date: 04/07/2022    Hyperlipidemia, unspecified hyperlipidemia type  Good control on crestor daily. Continue aspirin daily.    Lung nodule  Will repeat CT for stability.    Hypothyroidism, unspecified type  Good control on alternating doses of levothyroxine.    Migraine without aura and without status migrainosus, not intractable  Uncontrolled and continue to follow with neurology. Insurance will not cover nurtec. Will give her toradol to use PRN severe migraines.   -     ketorolac (TORADOL) 10 mg tablet; Take 1 tablet (10 mg total) by mouth every 8 (eight) hours as needed for Pain (migraine onset).  Dispense: 30 tablet; Refill: 2    Major depressive disorder, recurrent episode, moderate  Well controlled and continue current regimen.     Bipolar 2 disorder  Good control on this regimen. Continue to follow with psychiatry.   -     Hemoglobin A1C; Future; Expected date: 04/07/2022    PTSD (post-traumatic stress disorder)  STable on current regimen.    HUNTER (generalized anxiety disorder) with Panic attack  STable and denies any active symptoms    Psychophysiological insomnia  She is sleeping well on this regimen.    Acute left-sided low back pain without sciatica  Acute on chronic low back pain. Given toradol IM in office today. Okay to take gabapentin and muscle relaxer as needed.   -     ketorolac injection 30 mg    Osteoarthritis of cervical and lumbar spine  Increased symptoms today after loading her RV.   Supportive care.     Follow up in about 6 months (around 10/7/2022) for chronic medical issues.

## 2022-05-31 ENCOUNTER — PATIENT MESSAGE (OUTPATIENT)
Dept: FAMILY MEDICINE | Facility: CLINIC | Age: 57
End: 2022-05-31
Payer: COMMERCIAL

## 2022-05-31 RX ORDER — ESCITALOPRAM OXALATE 20 MG/1
30 TABLET ORAL DAILY
Qty: 90 TABLET | Refills: 3 | Status: SHIPPED | OUTPATIENT
Start: 2022-05-31 | End: 2022-11-03

## 2022-05-31 NOTE — TELEPHONE ENCOUNTER
I sent lexapro to the pharm but I do not manage her klonopin. This needs to come from psychiatry    Thanks

## 2022-06-01 ENCOUNTER — PATIENT MESSAGE (OUTPATIENT)
Dept: FAMILY MEDICINE | Facility: CLINIC | Age: 57
End: 2022-06-01
Payer: COMMERCIAL

## 2022-06-01 RX ORDER — CLONAZEPAM 1 MG/1
1 TABLET ORAL DAILY PRN
Qty: 30 TABLET | Refills: 5 | Status: SHIPPED | OUTPATIENT
Start: 2022-06-01 | End: 2023-01-30 | Stop reason: SDUPTHER

## 2022-06-24 ENCOUNTER — PATIENT OUTREACH (OUTPATIENT)
Dept: ADMINISTRATIVE | Facility: HOSPITAL | Age: 57
End: 2022-06-24
Payer: COMMERCIAL

## 2022-06-24 ENCOUNTER — PATIENT MESSAGE (OUTPATIENT)
Dept: ADMINISTRATIVE | Facility: HOSPITAL | Age: 57
End: 2022-06-24
Payer: COMMERCIAL

## 2022-06-24 VITALS — DIASTOLIC BLOOD PRESSURE: 84 MMHG | SYSTOLIC BLOOD PRESSURE: 131 MMHG

## 2022-06-24 NOTE — PROGRESS NOTES
Uncontrolled BP >140/90  BP Readings from Last 3 Encounters:   04/07/22 (!) 126/90   10/15/21 122/82   07/01/20 (!) 143/80       NEED REMOTE HOME BP READING DOCUMENTED   OR  BP FOLLOW UP WITH NURSE VISIT OR CARE TEAM MEMBER    See patient portal msg.

## 2022-07-08 NOTE — TELEPHONE ENCOUNTER
No new care gaps identified.  [facility] Embedded Care Gaps. Reference number: [id number]. 7/08/2022   5:02:07 PM CDT

## 2022-07-12 RX ORDER — QUETIAPINE FUMARATE 50 MG/1
TABLET, FILM COATED ORAL
Qty: 90 TABLET | Refills: 1 | OUTPATIENT
Start: 2022-07-12

## 2022-08-09 ENCOUNTER — PATIENT MESSAGE (OUTPATIENT)
Dept: FAMILY MEDICINE | Facility: CLINIC | Age: 57
End: 2022-08-09
Payer: COMMERCIAL

## 2022-08-09 RX ORDER — QUETIAPINE FUMARATE 50 MG/1
TABLET, FILM COATED ORAL
Qty: 90 TABLET | Refills: 1 | OUTPATIENT
Start: 2022-08-09

## 2022-08-09 NOTE — TELEPHONE ENCOUNTER
No new care gaps identified.  [facility] Embedded Care Gaps. Reference number: [id number]. 8/09/2022   5:20:14 PM CDT

## 2022-08-09 NOTE — TELEPHONE ENCOUNTER
No new care gaps identified.  [facility] Embedded Care Gaps. Reference number: [id number]. 8/09/2022   11:19:05 AM EYAD

## 2022-08-12 NOTE — TELEPHONE ENCOUNTER
At her appt on 4/2022 she was not taking seroquel. I have her taking klonopin, lamictal and lexapro    Thanks

## 2022-08-13 RX ORDER — QUETIAPINE FUMARATE 50 MG/1
50 TABLET, FILM COATED ORAL DAILY
Qty: 90 TABLET | Refills: 1 | Status: SHIPPED | OUTPATIENT
Start: 2022-08-13 | End: 2023-02-20 | Stop reason: SDUPTHER

## 2022-08-13 RX ORDER — METHOCARBAMOL 750 MG/1
750 TABLET, FILM COATED ORAL NIGHTLY PRN
Qty: 90 TABLET | Refills: 2 | Status: SHIPPED | OUTPATIENT
Start: 2022-08-13

## 2022-10-12 RX ORDER — MIRTAZAPINE 30 MG/1
TABLET, FILM COATED ORAL
COMMUNITY
End: 2023-11-28

## 2022-10-12 RX ORDER — FREMANEZUMAB-VFRM 225 MG/1.5ML
INJECTION SUBCUTANEOUS
COMMUNITY
End: 2022-10-15

## 2022-10-12 RX ORDER — DULOXETIN HYDROCHLORIDE 30 MG/1
CAPSULE, DELAYED RELEASE ORAL
COMMUNITY
End: 2022-10-15

## 2022-10-13 ENCOUNTER — LAB VISIT (OUTPATIENT)
Dept: LAB | Facility: HOSPITAL | Age: 57
End: 2022-10-13
Attending: INTERNAL MEDICINE
Payer: COMMERCIAL

## 2022-10-13 DIAGNOSIS — I10 ESSENTIAL HYPERTENSION: ICD-10-CM

## 2022-10-13 DIAGNOSIS — F31.81 BIPOLAR 2 DISORDER: ICD-10-CM

## 2022-10-13 LAB
ALBUMIN SERPL BCP-MCNC: 4.1 G/DL (ref 3.5–5.2)
ALP SERPL-CCNC: 74 U/L (ref 55–135)
ALT SERPL W/O P-5'-P-CCNC: 25 U/L (ref 10–44)
ANION GAP SERPL CALC-SCNC: 9 MMOL/L (ref 8–16)
AST SERPL-CCNC: 21 U/L (ref 10–40)
BASOPHILS # BLD AUTO: 0.04 K/UL (ref 0–0.2)
BASOPHILS NFR BLD: 0.6 % (ref 0–1.9)
BILIRUB SERPL-MCNC: 0.8 MG/DL (ref 0.1–1)
BUN SERPL-MCNC: 13 MG/DL (ref 6–20)
CALCIUM SERPL-MCNC: 9.8 MG/DL (ref 8.7–10.5)
CHLORIDE SERPL-SCNC: 106 MMOL/L (ref 95–110)
CO2 SERPL-SCNC: 29 MMOL/L (ref 23–29)
CREAT SERPL-MCNC: 0.9 MG/DL (ref 0.5–1.4)
DIFFERENTIAL METHOD: NORMAL
EOSINOPHIL # BLD AUTO: 0.3 K/UL (ref 0–0.5)
EOSINOPHIL NFR BLD: 4.9 % (ref 0–8)
ERYTHROCYTE [DISTWIDTH] IN BLOOD BY AUTOMATED COUNT: 12.6 % (ref 11.5–14.5)
EST. GFR  (NO RACE VARIABLE): >60 ML/MIN/1.73 M^2
ESTIMATED AVG GLUCOSE: 108 MG/DL (ref 68–131)
GLUCOSE SERPL-MCNC: 104 MG/DL (ref 70–110)
HBA1C MFR BLD: 5.4 % (ref 4–5.6)
HCT VFR BLD AUTO: 43.8 % (ref 37–48.5)
HGB BLD-MCNC: 14.3 G/DL (ref 12–16)
IMM GRANULOCYTES # BLD AUTO: 0.01 K/UL (ref 0–0.04)
IMM GRANULOCYTES NFR BLD AUTO: 0.2 % (ref 0–0.5)
LYMPHOCYTES # BLD AUTO: 1.4 K/UL (ref 1–4.8)
LYMPHOCYTES NFR BLD: 21.4 % (ref 18–48)
MCH RBC QN AUTO: 29.7 PG (ref 27–31)
MCHC RBC AUTO-ENTMCNC: 32.6 G/DL (ref 32–36)
MCV RBC AUTO: 91 FL (ref 82–98)
MONOCYTES # BLD AUTO: 0.6 K/UL (ref 0.3–1)
MONOCYTES NFR BLD: 9.2 % (ref 4–15)
NEUTROPHILS # BLD AUTO: 4 K/UL (ref 1.8–7.7)
NEUTROPHILS NFR BLD: 63.7 % (ref 38–73)
NRBC BLD-RTO: 0 /100 WBC
PLATELET # BLD AUTO: 257 K/UL (ref 150–450)
PMV BLD AUTO: 11.6 FL (ref 9.2–12.9)
POTASSIUM SERPL-SCNC: 4.1 MMOL/L (ref 3.5–5.1)
PROT SERPL-MCNC: 6.7 G/DL (ref 6–8.4)
RBC # BLD AUTO: 4.82 M/UL (ref 4–5.4)
SODIUM SERPL-SCNC: 144 MMOL/L (ref 136–145)
WBC # BLD AUTO: 6.3 K/UL (ref 3.9–12.7)

## 2022-10-13 PROCEDURE — 83036 HEMOGLOBIN GLYCOSYLATED A1C: CPT | Performed by: INTERNAL MEDICINE

## 2022-10-13 PROCEDURE — 84443 ASSAY THYROID STIM HORMONE: CPT | Performed by: INTERNAL MEDICINE

## 2022-10-13 PROCEDURE — 36415 COLL VENOUS BLD VENIPUNCTURE: CPT | Mod: PO | Performed by: INTERNAL MEDICINE

## 2022-10-13 PROCEDURE — 85025 COMPLETE CBC W/AUTO DIFF WBC: CPT | Performed by: INTERNAL MEDICINE

## 2022-10-13 PROCEDURE — 80053 COMPREHEN METABOLIC PANEL: CPT | Performed by: INTERNAL MEDICINE

## 2022-10-13 PROCEDURE — 80061 LIPID PANEL: CPT | Performed by: INTERNAL MEDICINE

## 2022-10-14 ENCOUNTER — OFFICE VISIT (OUTPATIENT)
Dept: FAMILY MEDICINE | Facility: CLINIC | Age: 57
End: 2022-10-14
Payer: COMMERCIAL

## 2022-10-14 VITALS
OXYGEN SATURATION: 95 % | BODY MASS INDEX: 32.23 KG/M2 | TEMPERATURE: 98 F | RESPIRATION RATE: 20 BRPM | DIASTOLIC BLOOD PRESSURE: 84 MMHG | WEIGHT: 181.88 LBS | HEIGHT: 63 IN | SYSTOLIC BLOOD PRESSURE: 131 MMHG | HEART RATE: 67 BPM

## 2022-10-14 DIAGNOSIS — F33.1 MAJOR DEPRESSIVE DISORDER, RECURRENT EPISODE, MODERATE: ICD-10-CM

## 2022-10-14 DIAGNOSIS — Z12.31 ENCOUNTER FOR SCREENING MAMMOGRAM FOR MALIGNANT NEOPLASM OF BREAST: ICD-10-CM

## 2022-10-14 DIAGNOSIS — E78.5 HYPERLIPIDEMIA, UNSPECIFIED HYPERLIPIDEMIA TYPE: ICD-10-CM

## 2022-10-14 DIAGNOSIS — F41.1 GAD (GENERALIZED ANXIETY DISORDER): ICD-10-CM

## 2022-10-14 DIAGNOSIS — J30.9 CHRONIC ALLERGIC RHINITIS: ICD-10-CM

## 2022-10-14 DIAGNOSIS — F51.04 PSYCHOPHYSIOLOGICAL INSOMNIA: ICD-10-CM

## 2022-10-14 DIAGNOSIS — F31.81 BIPOLAR 2 DISORDER: ICD-10-CM

## 2022-10-14 DIAGNOSIS — K52.9 CHRONIC DIARRHEA: ICD-10-CM

## 2022-10-14 DIAGNOSIS — E03.9 HYPOTHYROIDISM, UNSPECIFIED TYPE: ICD-10-CM

## 2022-10-14 DIAGNOSIS — G43.009 MIGRAINE WITHOUT AURA AND WITHOUT STATUS MIGRAINOSUS, NOT INTRACTABLE: ICD-10-CM

## 2022-10-14 DIAGNOSIS — Z23 NEED FOR IMMUNIZATION AGAINST INFLUENZA: ICD-10-CM

## 2022-10-14 DIAGNOSIS — Z00.00 WELL ADULT EXAM: Primary | ICD-10-CM

## 2022-10-14 DIAGNOSIS — Z23 NEED FOR SHINGLES VACCINE: ICD-10-CM

## 2022-10-14 DIAGNOSIS — I10 ESSENTIAL HYPERTENSION: ICD-10-CM

## 2022-10-14 DIAGNOSIS — R91.1 LUNG NODULE: ICD-10-CM

## 2022-10-14 DIAGNOSIS — M47.812 OSTEOARTHRITIS OF CERVICAL AND LUMBAR SPINE: ICD-10-CM

## 2022-10-14 DIAGNOSIS — E66.09 CLASS 1 OBESITY DUE TO EXCESS CALORIES WITH SERIOUS COMORBIDITY AND BODY MASS INDEX (BMI) OF 32.0 TO 32.9 IN ADULT: ICD-10-CM

## 2022-10-14 DIAGNOSIS — F41.0 PANIC ATTACK: ICD-10-CM

## 2022-10-14 DIAGNOSIS — I25.10 CORONARY ARTERY DISEASE INVOLVING NATIVE CORONARY ARTERY OF NATIVE HEART WITHOUT ANGINA PECTORIS: ICD-10-CM

## 2022-10-14 DIAGNOSIS — M47.816 OSTEOARTHRITIS OF CERVICAL AND LUMBAR SPINE: ICD-10-CM

## 2022-10-14 DIAGNOSIS — M47.27 OSTEOARTHRITIS OF SPINE WITH RADICULOPATHY, LUMBOSACRAL REGION: ICD-10-CM

## 2022-10-14 DIAGNOSIS — F43.10 PTSD (POST-TRAUMATIC STRESS DISORDER): ICD-10-CM

## 2022-10-14 LAB
CHOLEST SERPL-MCNC: 130 MG/DL (ref 120–199)
CHOLEST/HDLC SERPL: 3.4 {RATIO} (ref 2–5)
HDLC SERPL-MCNC: 38 MG/DL (ref 40–75)
HDLC SERPL: 29.2 % (ref 20–50)
LDLC SERPL CALC-MCNC: 60 MG/DL (ref 63–159)
NONHDLC SERPL-MCNC: 92 MG/DL
TRIGL SERPL-MCNC: 160 MG/DL (ref 30–150)
TSH SERPL DL<=0.005 MIU/L-ACNC: 3.5 UIU/ML (ref 0.4–4)

## 2022-10-14 PROCEDURE — 1159F MED LIST DOCD IN RCRD: CPT | Mod: CPTII,S$GLB,, | Performed by: INTERNAL MEDICINE

## 2022-10-14 PROCEDURE — 3044F PR MOST RECENT HEMOGLOBIN A1C LEVEL <7.0%: ICD-10-PCS | Mod: CPTII,S$GLB,, | Performed by: INTERNAL MEDICINE

## 2022-10-14 PROCEDURE — 1159F PR MEDICATION LIST DOCUMENTED IN MEDICAL RECORD: ICD-10-PCS | Mod: CPTII,S$GLB,, | Performed by: INTERNAL MEDICINE

## 2022-10-14 PROCEDURE — 90686 IIV4 VACC NO PRSV 0.5 ML IM: CPT | Mod: S$GLB,,, | Performed by: INTERNAL MEDICINE

## 2022-10-14 PROCEDURE — 99396 PREV VISIT EST AGE 40-64: CPT | Mod: 25,S$GLB,, | Performed by: INTERNAL MEDICINE

## 2022-10-14 PROCEDURE — 90686 FLU VACCINE (QUAD) GREATER THAN OR EQUAL TO 3YO PRESERVATIVE FREE IM: ICD-10-PCS | Mod: S$GLB,,, | Performed by: INTERNAL MEDICINE

## 2022-10-14 PROCEDURE — 1160F PR REVIEW ALL MEDS BY PRESCRIBER/CLIN PHARMACIST DOCUMENTED: ICD-10-PCS | Mod: CPTII,S$GLB,, | Performed by: INTERNAL MEDICINE

## 2022-10-14 PROCEDURE — 3079F DIAST BP 80-89 MM HG: CPT | Mod: CPTII,S$GLB,, | Performed by: INTERNAL MEDICINE

## 2022-10-14 PROCEDURE — 99396 PR PREVENTIVE VISIT,EST,40-64: ICD-10-PCS | Mod: 25,S$GLB,, | Performed by: INTERNAL MEDICINE

## 2022-10-14 PROCEDURE — 3075F SYST BP GE 130 - 139MM HG: CPT | Mod: CPTII,S$GLB,, | Performed by: INTERNAL MEDICINE

## 2022-10-14 PROCEDURE — 90750 HZV VACC RECOMBINANT IM: CPT | Mod: S$GLB,,, | Performed by: INTERNAL MEDICINE

## 2022-10-14 PROCEDURE — 1160F RVW MEDS BY RX/DR IN RCRD: CPT | Mod: CPTII,S$GLB,, | Performed by: INTERNAL MEDICINE

## 2022-10-14 PROCEDURE — 90472 ZOSTER RECOMBINANT VACCINE: ICD-10-PCS | Mod: S$GLB,,, | Performed by: INTERNAL MEDICINE

## 2022-10-14 PROCEDURE — 3075F PR MOST RECENT SYSTOLIC BLOOD PRESS GE 130-139MM HG: ICD-10-PCS | Mod: CPTII,S$GLB,, | Performed by: INTERNAL MEDICINE

## 2022-10-14 PROCEDURE — 90472 IMMUNIZATION ADMIN EACH ADD: CPT | Mod: S$GLB,,, | Performed by: INTERNAL MEDICINE

## 2022-10-14 PROCEDURE — 90750 ZOSTER RECOMBINANT VACCINE: ICD-10-PCS | Mod: S$GLB,,, | Performed by: INTERNAL MEDICINE

## 2022-10-14 PROCEDURE — 90471 FLU VACCINE (QUAD) GREATER THAN OR EQUAL TO 3YO PRESERVATIVE FREE IM: ICD-10-PCS | Mod: S$GLB,,, | Performed by: INTERNAL MEDICINE

## 2022-10-14 PROCEDURE — 90471 IMMUNIZATION ADMIN: CPT | Mod: S$GLB,,, | Performed by: INTERNAL MEDICINE

## 2022-10-14 PROCEDURE — 3079F PR MOST RECENT DIASTOLIC BLOOD PRESSURE 80-89 MM HG: ICD-10-PCS | Mod: CPTII,S$GLB,, | Performed by: INTERNAL MEDICINE

## 2022-10-14 PROCEDURE — 3044F HG A1C LEVEL LT 7.0%: CPT | Mod: CPTII,S$GLB,, | Performed by: INTERNAL MEDICINE

## 2022-10-14 RX ORDER — FLUTICASONE PROPIONATE 50 MCG
2 SPRAY, SUSPENSION (ML) NASAL DAILY
Qty: 16 G | Refills: 6 | Status: SHIPPED | OUTPATIENT
Start: 2022-10-14

## 2022-10-14 NOTE — PROGRESS NOTES
Subjective:       Patient ID: Delaney Noel is a 57 y.o. female.    Medication List with Changes/Refills   New Medications    FLUTICASONE PROPIONATE (FLONASE) 50 MCG/ACTUATION NASAL SPRAY    2 sprays (100 mcg total) by Each Nostril route once daily.   Current Medications    ALBUTEROL (PROVENTIL/VENTOLIN HFA) 90 MCG/ACTUATION INHALER    INHALE 2 PUFFS INTO THE LUNGS EVERY 6 (SIX) HOURS AS NEEDED FOR WHEEZING. RESCUE    ASPIRIN (ECOTRIN) 81 MG EC TABLET    Take 1 tablet (81 mg total) by mouth once daily.    ATENOLOL (TENORMIN) 50 MG TABLET        CETIRIZINE (ZYRTEC) 10 MG TABLET    Take 10 mg by mouth once daily. Take one(1) tab by mouth daily    CLONAZEPAM (KLONOPIN) 1 MG TABLET    Take 1 tablet (1 mg total) by mouth daily as needed for Anxiety.    EMGALITY  MG/ML PNIJ        ESCITALOPRAM OXALATE (LEXAPRO) 20 MG TABLET    Take 1.5 tablets (30 mg total) by mouth once daily.    GABAPENTIN (NEURONTIN) 300 MG CAPSULE    gabapentin 300 mg capsule   TAKE 1 CAPSULE BY MOUTH EVERYDAY AT BEDTIME    IBUPROFEN (ADVIL,MOTRIN) 800 MG TABLET    Take 800 mg by mouth 3 (three) times daily as needed.     KETOROLAC (TORADOL) 10 MG TABLET    TAKE 1 TABLET (10 MG TOTAL) BY MOUTH EVERY 8 (EIGHT) HOURS AS NEEDED FOR PAIN (MIGRAINE ONSET).    LAMOTRIGINE (LAMICTAL) 25 MG TABLET    75 mg once daily. Take three tabs(75mg) by mouth daily    LEVOTHYROXINE (SYNTHROID) 50 MCG TABLET    TAKE 1 TABLET BY MOUTH DAILY FOR 3 DAYS THEN ON 4 TH DAY TAKE 1 AND 1/2 TABLETS BY MOUTH DAILY    LEVOTHYROXINE (SYNTHROID) 75 MCG TABLET    TAKE 1 TABLET ONCE A DAY ON MONDAYS & WEDNESDAYS AND FRIDAYS    MELOXICAM (MOBIC) 15 MG TABLET    TAKE 1 TABLET BY MOUTH EVERY DAY    METHOCARBAMOL (ROBAXIN) 750 MG TAB    Take 1 tablet (750 mg total) by mouth nightly as needed.    MIRTAZAPINE (REMERON) 30 MG TABLET    mirtazapine 30 mg tablet   1 tablet at bedtime; 30 MG; Once a day    QUETIAPINE (SEROQUEL) 50 MG TABLET    Take 1 tablet (50 mg total) by mouth once  "daily.    ROSUVASTATIN (CRESTOR) 10 MG TABLET    Take 10 mg by mouth once daily.    Discontinued Medications    DULOXETINE (CYMBALTA) 30 MG CAPSULE    duloxetine 30 mg capsule,delayed release   TAKE 1 CAPSULE BY MOUTH EVERY DAY; 30 MG; 30    FREMANEZUMAB-VFRM (AJOVY SYRINGE) 225 MG/1.5 ML INJECTION    Ajovy Syringe 225 mg/1.5 mL subcutaneous   1.5 ml; 225 MG/1.5ML; 30 day(s)    SUMATRIPTAN (IMITREX) 100 MG TABLET    Imitrex 100 mg tablet   1 tablet as needed; 100 MG; Twice a day    TERBINAFINE HCL (LAMISIL) 1 % CREAM    Apply topically 2 (two) times daily.       Chief Complaint: Annual Exam  She is here today to f/u on chronic medical issues      She has hypertension and is taking atenolol 50 mg qday.  She had a positive stress test and then subsequent cath (do not have records) and she reports she had non-obstructive CAD. She denies chest pain or shortness of breath. No lower leg swelling. She does report "carotid disease".        She has hyperlipidemia and is taking crestor 10 mg qday. Her last lipids on 10/2021 were 173/256/42/79.  She is on aspirin daily.        She has hypothyroid and is taking levothyroxine 50 mcg qday on Tues-Thurs-Sat and Sun and 75 mcg qday on Mon-Wed-Fri.  Her TSH was normal on 10/2021   She did have a thyroid u/s on 11/2018 showing thyroiditis and unchanged from previous.      She has a small 4 mm lung nodule seen on CT on 7/2014. Repeat CT on 12/2019 showed on changes. She was scheduled to have surveillance CT but did not have this done.      She has fatty liver seen on CT on 12/2019.     She has chronic diarrhea for over 2 months with loose stools and at times incontinence of stool.  Symptoms occur all day with multiple stools. She has increased gas. No blood in stool. No abdominal pain. No fevers or weight loss.      She has chronic migraines and is followed by neurology.  She is taking emgality monthly and toradol as needed. Symptoms associated with migraines are sensitive to light " with nausea, dizziness and confusion. She feels her migraines are doing better on this regimen. She is having 3 migraines a month compared to over 15 previously.  She is followed by neurology but is considering establishing with [facility].       She has multiple psychiatric issues. She has major depression with suicidal ideations (recent hospitalization for suicidal thoughts on 12/3/2019), anxiety with panic attack, bipolar type 2, PTSD with insomnia and OCD. She continues on klonopin 1 mg bid, lamictal 75 mg daily and lexapro 30 mg daily and seroquel 50 mg nightly.  She takes remeron 30 mg nightly. She is followed by psychiatry. She feels this is helping her symptoms and denies any suicidal ideations at this time.      She has chronic low back pain since 10/2017. Around the time it was diagnosed she was having pain down her right leg and trouble walking.  She had an MRI of lumbar and cervical spine in 1/2019 showing mild spondylotic changes in L4-L5, L5-S1 and mild disease in cervical vertebra. She sent to PT which she feels helped with her symptoms. She is taking mobic 15 mg qam and robaxin 750 mg qhs PRN muscle spasms and gabapentin 300 mg when she has radicular pain.  She reports pain worse with prolonged sitting or driving, and better with moving, home exercises, mobic and gabapentin.  Rated 1/10 to 10/10.  No weakness but her pain does radiate down her right leg.  She continues to have worsening low back pain and is able to do less due to her pain.  She would like to go back to PT and would like to have another evaluation.      She lives with her  and feels safe at home. She does not exercise.  She is no longer working. She enjoys traveling in her RV.      Colonoscopy---7/2020  Mammogram----3/2022 neg   Pap-----12/2017 neg HPV neg---JAMIE  Tdap---9/2019  Influenza vaccine-----10/2021   Shingles vaccine-----10/2021  covid vaccine----2 doses     Review of Systems   Constitutional:  Negative  "for appetite change, fatigue, fever and unexpected weight change.   HENT:  Positive for congestion. Negative for ear pain, hearing loss, sore throat and trouble swallowing.    Eyes:  Negative for pain and visual disturbance.   Respiratory:  Positive for cough and shortness of breath. Negative for chest tightness and wheezing.    Cardiovascular:  Negative for chest pain, palpitations and leg swelling.   Gastrointestinal:  Positive for diarrhea. Negative for abdominal pain, blood in stool, constipation, nausea and vomiting.   Endocrine: Negative for polyuria.   Genitourinary:  Negative for dysuria and hematuria.   Musculoskeletal:  Positive for back pain. Negative for arthralgias and myalgias.   Skin:  Negative for rash.   Allergic/Immunologic: Positive for environmental allergies.   Neurological:  Positive for headaches. Negative for dizziness, weakness and numbness.   Hematological:  Does not bruise/bleed easily.   Psychiatric/Behavioral:  Positive for dysphoric mood and sleep disturbance. Negative for suicidal ideas. The patient is nervous/anxious.      Objective:      Vitals:    10/14/22 0942   BP: 131/84   Pulse: 67   Resp: 20   Temp: 98.2 °F (36.8 °C)   SpO2: 95%   Weight: 82.5 kg (181 lb 14.1 oz)   Height: 5' 3" (1.6 m)     Body mass index is 32.22 kg/m².  Physical Exam    General appearance: No acute distress, cooperative  Eyes: PERRL, EOMI, conjunctiva clear  Ears: normal external ear and pinna, tm clear without drainage, canals clear  Nose: Normal mucosa without drainage  Throat: no exudates or erythema, tonsils not enlarged  Mouth: no sores or lesions, moist mucous membranes  Neck: FROM, soft, supple, no thyromegaly, no bruits  Lymph: no anterior or posterior cervical adenopathy  Heart::  Regular rate and rhythm, no murmur  Lung: Clear to ascultation bilaterally, no wheezing, no rales, no rhonchi, no distress  Abdomen: Soft, nontender, no distention, no hepatosplenomegaly, bowel sounds normal, no guarding, " no rebound, no peritoneal signs  Skin: no rashes, no lesions  Extremities: no edema, no cyanosis  Neuro: CN 2-12 intact, 5/5 muscle strength upper and lower extremity bilaterally, 2+ DTRs UE and LE bilaterally, normal gait  Peripheral pulses: 2+ pedal pulses bilaterally, good perfusion and color  Musculoskeletal: FROM, good strenth, no tenderness  Joint: normal appearance, no swelling, no warmth, no deformity in all joints    Assessment:       1. Well adult exam    2. Coronary artery disease involving native coronary artery of native heart without angina pectoris    3. Essential hypertension    4. Hyperlipidemia, unspecified hyperlipidemia type    5. Lung nodule    6. Chronic allergic rhinitis    7. Hypothyroidism, unspecified type    8. Chronic diarrhea    9. Migraine without aura and without status migrainosus, not intractable    10. Major depressive disorder, recurrent episode, moderate    11. Bipolar 2 disorder    12. PTSD (post-traumatic stress disorder)    13. HUNTER (generalized anxiety disorder)    14. Panic attack    15. Psychophysiological insomnia    16. Osteoarthritis of spine with radiculopathy, lumbosacral region    17. Osteoarthritis of cervical and lumbar spine    18. Class 1 obesity due to excess calories with serious comorbidity and body mass index (BMI) of 32.0 to 32.9 in adult    19. Encounter for screening mammogram for malignant neoplasm of breast    20. Need for immunization against influenza    21. Need for shingles vaccine        Plan:       Well adult exam  Will add lipids and thyroid to labs done yesterday.  Mammogram is UTD and colonoscopy is UTD.  Given influenza vaccine and shingles vaccine today.     Coronary artery disease involving native coronary artery of native heart without angina pectoris  Stable and no active symptoms.    Essential hypertension  Well controlled and continue current regimen.   -     Lipid Panel; Future; Expected date: 10/14/2022  -     TSH; Future; Expected date:  10/14/2022    Hyperlipidemia, unspecified hyperlipidemia type  Will get lipids. Continue crestor and aspirpin.    Lung nodule  Due for surveillance CT.   -     CT Chest Without Contrast; Future; Expected date: 10/14/2022    Chronic allergic rhinitis  Uncontrolled and will add flonase to her regimen.   -     fluticasone propionate (FLONASE) 50 mcg/actuation nasal spray; 2 sprays (100 mcg total) by Each Nostril route once daily.  Dispense: 16 g; Refill: 6    Hypothyroidism, unspecified type  Due to recheck TSH. Continue this dosing of levothyroxine.    Chronic diarrhea  Persistent diarrhea and will refer to GI.   -     Ambulatory referral/consult to Gastroenterology; Future; Expected date: 10/21/2022    Migraine without aura and without status migrainosus, not intractable  Improved on emgality.    Major depressive disorder, recurrent episode, moderate  Well controlled and continue current regimen.     Bipolar 2 disorder  STable on this regimen.    PTSD (post-traumatic stress disorder)  Well controlled and continue current regimen.     HUNTER (generalized anxiety disorder) with Panic attack  No recent flares. She continues on klonopin bid    Psychophysiological insomnia  She continues on seroquel and remeron.    Osteoarthritis of spine with radiculopathy, lumbosacral region  Uncontrolled and worsening. Will get xrays and then consider MRI of lumbar spine. Referral to pain clinic and PT.   -     Ambulatory referral/consult to Physical/Occupational Therapy; Future; Expected date: 10/21/2022  -     Ambulatory referral/consult to Pain Clinic; Future; Expected date: 10/21/2022    Osteoarthritis of cervical and lumbar spine  -     X-Ray Lumbar Spine 5 View; Future; Expected date: 10/14/2022  -     Ambulatory referral/consult to Pain Clinic; Future; Expected date: 10/21/2022    Class 1 obesity due to excess calories with serious comorbidity and body mass index (BMI) of 32.0 to 32.9 in adult  Long discussion on the benefits of  healthy eating and regular exercise to help lose weight and help control hypertension, CAD and hyperlipidemia.     Encounter for screening mammogram for malignant neoplasm of breast  -     Mammo Digital Screening Bilat w/ Yair; Future; Expected date: 10/14/2022    Need for immunization against influenza  -     Influenza - Quadrivalent (PF)    Need for shingles vaccine  -     (In Office Administered) Zoster Recombinant Vaccine    Follow up in about 6 months (around 4/14/2023) for chronic medical issues.

## 2022-10-15 PROBLEM — E66.09 CLASS 1 OBESITY DUE TO EXCESS CALORIES WITH SERIOUS COMORBIDITY AND BODY MASS INDEX (BMI) OF 32.0 TO 32.9 IN ADULT: Status: ACTIVE | Noted: 2022-10-15

## 2022-10-15 PROBLEM — E66.811 CLASS 1 OBESITY DUE TO EXCESS CALORIES WITH SERIOUS COMORBIDITY AND BODY MASS INDEX (BMI) OF 32.0 TO 32.9 IN ADULT: Status: ACTIVE | Noted: 2022-10-15

## 2022-10-19 ENCOUNTER — OFFICE VISIT (OUTPATIENT)
Dept: GASTROENTEROLOGY | Facility: CLINIC | Age: 57
End: 2022-10-19
Payer: COMMERCIAL

## 2022-10-19 ENCOUNTER — HOSPITAL ENCOUNTER (OUTPATIENT)
Dept: RADIOLOGY | Facility: HOSPITAL | Age: 57
Discharge: HOME | End: 2022-10-19
Attending: INTERNAL MEDICINE
Payer: COMMERCIAL

## 2022-10-19 VITALS — WEIGHT: 196 LBS | RESPIRATION RATE: 17 BRPM | HEIGHT: 63 IN | BODY MASS INDEX: 34.73 KG/M2

## 2022-10-19 DIAGNOSIS — M47.816 OSTEOARTHRITIS OF CERVICAL AND LUMBAR SPINE: ICD-10-CM

## 2022-10-19 DIAGNOSIS — R10.30 LOWER ABDOMINAL PAIN: ICD-10-CM

## 2022-10-19 DIAGNOSIS — R91.1 LUNG NODULE: ICD-10-CM

## 2022-10-19 DIAGNOSIS — Z86.010 HISTORY OF COLON POLYPS: ICD-10-CM

## 2022-10-19 DIAGNOSIS — K52.9 CHRONIC DIARRHEA: Primary | ICD-10-CM

## 2022-10-19 DIAGNOSIS — M47.812 OSTEOARTHRITIS OF CERVICAL AND LUMBAR SPINE: ICD-10-CM

## 2022-10-19 DIAGNOSIS — R19.8 IRREGULAR BOWEL HABITS: ICD-10-CM

## 2022-10-19 DIAGNOSIS — R14.3 FLATUS: ICD-10-CM

## 2022-10-19 PROCEDURE — 72110 X-RAY EXAM L-2 SPINE 4/>VWS: CPT | Mod: TC,FY,PO

## 2022-10-19 PROCEDURE — 1160F PR REVIEW ALL MEDS BY PRESCRIBER/CLIN PHARMACIST DOCUMENTED: ICD-10-PCS | Mod: CPTII,S$GLB,, | Performed by: NURSE PRACTITIONER

## 2022-10-19 PROCEDURE — 71250 CT THORAX DX C-: CPT | Mod: TC,PO

## 2022-10-19 PROCEDURE — 1159F PR MEDICATION LIST DOCUMENTED IN MEDICAL RECORD: ICD-10-PCS | Mod: CPTII,S$GLB,, | Performed by: NURSE PRACTITIONER

## 2022-10-19 PROCEDURE — 72110 XR LUMBAR SPINE COMPLETE 5 VIEW: ICD-10-PCS | Mod: 26,,, | Performed by: RADIOLOGY

## 2022-10-19 PROCEDURE — 3044F PR MOST RECENT HEMOGLOBIN A1C LEVEL <7.0%: ICD-10-PCS | Mod: CPTII,S$GLB,, | Performed by: NURSE PRACTITIONER

## 2022-10-19 PROCEDURE — 71250 CT THORAX DX C-: CPT | Mod: 26,,, | Performed by: RADIOLOGY

## 2022-10-19 PROCEDURE — 1160F RVW MEDS BY RX/DR IN RCRD: CPT | Mod: CPTII,S$GLB,, | Performed by: NURSE PRACTITIONER

## 2022-10-19 PROCEDURE — 72110 X-RAY EXAM L-2 SPINE 4/>VWS: CPT | Mod: 26,,, | Performed by: RADIOLOGY

## 2022-10-19 PROCEDURE — 99214 OFFICE O/P EST MOD 30 MIN: CPT | Mod: S$GLB,,, | Performed by: NURSE PRACTITIONER

## 2022-10-19 PROCEDURE — 99214 PR OFFICE/OUTPT VISIT, EST, LEVL IV, 30-39 MIN: ICD-10-PCS | Mod: S$GLB,,, | Performed by: NURSE PRACTITIONER

## 2022-10-19 PROCEDURE — 71250 CT CHEST WITHOUT CONTRAST: ICD-10-PCS | Mod: 26,,, | Performed by: RADIOLOGY

## 2022-10-19 PROCEDURE — 1159F MED LIST DOCD IN RCRD: CPT | Mod: CPTII,S$GLB,, | Performed by: NURSE PRACTITIONER

## 2022-10-19 PROCEDURE — 3044F HG A1C LEVEL LT 7.0%: CPT | Mod: CPTII,S$GLB,, | Performed by: NURSE PRACTITIONER

## 2022-10-19 PROCEDURE — 99999 PR PBB SHADOW E&M-EST. PATIENT-LVL V: CPT | Mod: PBBFAC,,, | Performed by: NURSE PRACTITIONER

## 2022-10-19 PROCEDURE — 99999 PR PBB SHADOW E&M-EST. PATIENT-LVL V: ICD-10-PCS | Mod: PBBFAC,,, | Performed by: NURSE PRACTITIONER

## 2022-10-19 RX ORDER — DICYCLOMINE HYDROCHLORIDE 10 MG/1
10 CAPSULE ORAL
Qty: 120 CAPSULE | Refills: 0 | Status: SHIPPED | OUTPATIENT
Start: 2022-10-19 | End: 2022-11-14

## 2022-10-19 NOTE — PROGRESS NOTES
Subjective:       Patient ID: Delaney Noel is a 57 y.o. female, Body mass index is 34.72 kg/m².    Chief Complaint: Diarrhea      Patient is new to me. Established patient of Dr. Lozoya.  Referred by Dr. Swartz for chronic diarrhea.     Diarrhea   This is a chronic problem. The current episode started more than 1 year ago. The problem occurs 5 to 10 times per day (intermittent). The problem has been gradually worsening (over the past two months). The stool consistency is described as Watery (describes stool as type 6-7 on bristol scale; some days has type 1 stool; denies bloody stools). The patient states that diarrhea does not awaken her from sleep. Associated symptoms include abdominal pain (lower abdominal pain; intermittent) and increased flatus. Pertinent negatives include no bloating, chills, coughing, fever, vomiting or weight loss. Associated symptoms comments: Associated symptoms also include fecal urgency and occ fecal incontinece. The symptoms are aggravated by dairy products. There are no known risk factors (denies recent antibiotics, hospitalization or foreign travel). She has tried bismuth subsalicylate and change of diet (Currently: OTC Pepto bismol PRN- mild improvement) for the symptoms. There is no history of bowel resection, inflammatory bowel disease, irritable bowel syndrome or a recent abdominal surgery.   Review of Systems   Constitutional:  Negative for appetite change, chills, fever, unexpected weight change and weight loss.   HENT:  Negative for trouble swallowing.    Respiratory:  Negative for cough and shortness of breath.    Cardiovascular:  Negative for chest pain.   Gastrointestinal:  Positive for abdominal pain (lower abdominal pain; intermittent), diarrhea, flatus and nausea (associated with migraines). Negative for abdominal distention, anal bleeding, bloating, blood in stool, constipation, rectal pain and vomiting.   Genitourinary:  Negative for difficulty urinating and dysuria.    Musculoskeletal:  Negative for gait problem.   Skin:  Negative for rash.   Neurological:  Negative for speech difficulty.   Psychiatric/Behavioral:  Negative for confusion.      Past Medical History:   Diagnosis Date    Allergy     Depression     Hx of colonic polyps 08/04/2014    per colonoscopy report    Hypertension     Migraine     Suicidal thoughts 12/2019    [facility] tx     Thyroid disease     Hypothyroid, [facility]      Past Surgical History:   Procedure Laterality Date    COLONOSCOPY      COLONOSCOPY N/A 7/1/2020    Procedure: COLONOSCOPY;  Surgeon: Thiago Lozoya Jr., MD;  Location: [facility];  Service: Endoscopy;  Laterality: N/A;    cystoscope      HYSTERECTOMY      JAMIE with BSO- age 42    OOPHORECTOMY        Family History   Problem Relation Age of Onset    Heart disease Mother         had childhood rheumatic fever    Cancer Mother         uterine cancer    Cervical cancer Mother 28    Atrial fibrillation Father     Cancer Maternal Grandmother         uterine cancer    Stroke Maternal Grandmother     Heart disease Maternal Grandmother 50        MI    Stroke Maternal Grandfather     Nephrolithiasis Neg Hx       Wt Readings from Last 10 Encounters:   10/19/22 88.9 kg (195 lb 15.8 oz)   10/14/22 82.5 kg (181 lb 14.1 oz)   04/07/22 84.6 kg (186 lb 9.9 oz)   10/15/21 80 kg (176 lb 5.9 oz)   07/01/20 84.4 kg (186 lb)   05/29/20 85.5 kg (188 lb 6.1 oz)   12/16/19 82 kg (180 lb 12.4 oz)   12/03/19 81.2 kg (179 lb 0.2 oz)   10/12/19 81.2 kg (179 lb 0.2 oz)   09/16/19 81.2 kg (179 lb)     Lab Results   Component Value Date    WBC 6.30 10/13/2022    HGB 14.3 10/13/2022    HCT 43.8 10/13/2022    MCV 91 10/13/2022     10/13/2022     CMP  Sodium   Date Value Ref Range Status   10/13/2022 144 136 - 145 mmol/L Final     Potassium   Date Value Ref Range Status   10/13/2022 4.1 3.5 - 5.1 mmol/L Final     Chloride   Date Value Ref Range Status   10/13/2022 106 95 - 110 mmol/L Final     CO2   Date Value Ref  Range Status   10/13/2022 29 23 - 29 mmol/L Final     Glucose   Date Value Ref Range Status   10/13/2022 104 70 - 110 mg/dL Final     BUN   Date Value Ref Range Status   10/13/2022 13 6 - 20 mg/dL Final     Creatinine   Date Value Ref Range Status   10/13/2022 0.9 0.5 - 1.4 mg/dL Final     Calcium   Date Value Ref Range Status   10/13/2022 9.8 8.7 - 10.5 mg/dL Final     Total Protein   Date Value Ref Range Status   10/13/2022 6.7 6.0 - 8.4 g/dL Final     Albumin   Date Value Ref Range Status   10/13/2022 4.1 3.5 - 5.2 g/dL Final     Total Bilirubin   Date Value Ref Range Status   10/13/2022 0.8 0.1 - 1.0 mg/dL Final     Comment:     For infants and newborns, interpretation of results should be based  on gestational age, weight and in agreement with clinical  observations.    Premature Infant recommended reference ranges:  Up to 24 hours.............<8.0 mg/dL  Up to 48 hours............<12.0 mg/dL  3-5 days..................<15.0 mg/dL  6-29 days.................<15.0 mg/dL       Alkaline Phosphatase   Date Value Ref Range Status   10/13/2022 74 55 - 135 U/L Final     AST   Date Value Ref Range Status   10/13/2022 21 10 - 40 U/L Final     ALT   Date Value Ref Range Status   10/13/2022 25 10 - 44 U/L Final     Anion Gap   Date Value Ref Range Status   10/13/2022 9 8 - 16 mmol/L Final     eGFR if    Date Value Ref Range Status   10/15/2021 >60.0 >60 mL/min/1.73 m^2 Final     eGFR if non    Date Value Ref Range Status   10/15/2021 >60.0 >60 mL/min/1.73 m^2 Final     Comment:     Calculation used to obtain the estimated glomerular filtration  rate (eGFR) is the CKD-EPI equation.        Lab Results   Component Value Date    TSH 3.496 10/13/2022             Reviewed prior medical records including radiology report of CT Renal Stone Study 11/21/17 and CT of abdomen and pelvis 7/8/14 & endoscopy history (see surgical history).     Objective:      Physical Exam  Constitutional:       General:  She is not in acute distress.     Appearance: She is well-developed.   HENT:      Head: Normocephalic.      Right Ear: Hearing normal.      Left Ear: Hearing normal.      Nose: Nose normal.      Mouth/Throat:      Mouth: No oral lesions.      Pharynx: Uvula midline.   Eyes:      General: Lids are normal.      Conjunctiva/sclera: Conjunctivae normal.      Pupils: Pupils are equal, round, and reactive to light.   Neck:      Trachea: Trachea normal.   Cardiovascular:      Rate and Rhythm: Normal rate and regular rhythm.      Heart sounds: Normal heart sounds. No murmur heard.  Pulmonary:      Effort: Pulmonary effort is normal. No respiratory distress.      Breath sounds: Normal breath sounds. No stridor. No wheezing.   Abdominal:      General: Bowel sounds are normal. There is no distension.      Palpations: Abdomen is soft. There is no mass.      Tenderness: There is abdominal tenderness (mild) in the left lower quadrant. There is no guarding or rebound.   Musculoskeletal:         General: Normal range of motion.      Cervical back: Normal range of motion.   Skin:     General: Skin is warm and dry.      Findings: No rash.      Comments: Non jaundiced   Neurological:      Mental Status: She is alert and oriented to person, place, and time.   Psychiatric:         Speech: Speech normal.         Behavior: Behavior normal. Behavior is cooperative.         Assessment:       1. Chronic diarrhea    2. Irregular bowel habits    3. Flatus    4. Lower abdominal pain    5. History of colon polyps           Plan:   All diagnoses and orders for this visit:    Chronic diarrhea, Irregular bowel habits & Flatus        - Stool Exam-Ova,Cysts,Parasites; Future; Expected date: 10/19/2022  - Giardia / Cryptosporidum, EIA; Future; Expected date: 10/19/2022  - Stool culture; Future; Expected date: 10/19/2022  - WBC, Stool; Future; Expected date: 10/19/2022  - Occult blood x 1, stool; Future; Expected date: 10/19/2022  - pH, stool; Future;  Expected date: 10/19/2022  - Pancreatic elastase, fecal; Future; Expected date: 10/19/2022  - Clostridium difficile EIA; Future; Expected date: 10/19/2022  - TISSUE TRANSGLUTAMINASE (TTG), IGA; Future; Expected date: 10/19/2022  - IGA; Future; Expected date: 10/19/2022  - Recommended increase fiber in diet, especially soluble fiber since this can help bulk up the stool consistency and may help to slow down how fast the stool goes through the colon and can prevent diarrhea   - Avoid trigger foods  - Start OTC fiber supplement (Benefiber)  - Start: dicyclomine (BENTYL) 10 MG capsule; Take 1 capsule (10 mg total) by mouth before meals and at bedtime as needed (abdominal pain; diarrhea).  Dispense: 120 capsule; Refill: 0  - Schedule Colonoscopy     Lower abdominal pain  - Start: dicyclomine (BENTYL) 10 MG capsule; Take 1 capsule (10 mg total) by mouth before meals and at bedtime as needed (abdominal pain; diarrhea).  Dispense: 120 capsule; Refill: 0  - Schedule Colonoscopy   - Consider CT of abdomen and pelvis if symptoms persist    History of colon polyps   - Schedule Colonoscopy     If no improvement in symptoms or symptoms worsen, call/follow-up at clinic or go to ER

## 2022-10-22 ENCOUNTER — PATIENT MESSAGE (OUTPATIENT)
Dept: GASTROENTEROLOGY | Facility: CLINIC | Age: 57
End: 2022-10-22
Payer: COMMERCIAL

## 2022-10-23 ENCOUNTER — PATIENT MESSAGE (OUTPATIENT)
Dept: FAMILY MEDICINE | Facility: CLINIC | Age: 57
End: 2022-10-23
Payer: COMMERCIAL

## 2022-10-23 DIAGNOSIS — M47.27 OSTEOARTHRITIS OF SPINE WITH RADICULOPATHY, LUMBOSACRAL REGION: Primary | ICD-10-CM

## 2022-10-23 NOTE — TELEPHONE ENCOUNTER
Please let her know that her xrays showed arthritis most pronounced at lower lumbar levels. We need an MRI    Please get before pain appt on 11/11/2022.    Thanks

## 2022-10-24 NOTE — TELEPHONE ENCOUNTER
Left message to call clinic to get MRI scheduled before pain management on 11/11/22. [employer] message sent . --lp

## 2022-11-07 ENCOUNTER — TELEPHONE (OUTPATIENT)
Dept: PAIN MEDICINE | Facility: CLINIC | Age: 57
End: 2022-11-07
Payer: COMMERCIAL

## 2022-11-07 ENCOUNTER — PATIENT MESSAGE (OUTPATIENT)
Dept: PAIN MEDICINE | Facility: CLINIC | Age: 57
End: 2022-11-07
Payer: COMMERCIAL

## 2022-11-08 ENCOUNTER — HOSPITAL ENCOUNTER (OUTPATIENT)
Dept: RADIOLOGY | Facility: HOSPITAL | Age: 57
Discharge: HOME | End: 2022-11-08
Attending: INTERNAL MEDICINE
Payer: COMMERCIAL

## 2022-11-08 ENCOUNTER — PATIENT MESSAGE (OUTPATIENT)
Dept: PAIN MEDICINE | Facility: CLINIC | Age: 57
End: 2022-11-08
Payer: COMMERCIAL

## 2022-11-08 DIAGNOSIS — M47.27 OSTEOARTHRITIS OF SPINE WITH RADICULOPATHY, LUMBOSACRAL REGION: ICD-10-CM

## 2022-11-08 PROCEDURE — 72148 MRI LUMBAR SPINE W/O DYE: CPT | Mod: TC,PO

## 2022-11-08 PROCEDURE — 72148 MRI LUMBAR SPINE W/O DYE: CPT | Mod: 26,,, | Performed by: RADIOLOGY

## 2022-11-08 PROCEDURE — 72148 MRI LUMBAR SPINE WITHOUT CONTRAST: ICD-10-PCS | Mod: 26,,, | Performed by: RADIOLOGY

## 2022-11-14 ENCOUNTER — OFFICE VISIT (OUTPATIENT)
Dept: PAIN MEDICINE | Facility: CLINIC | Age: 57
End: 2022-11-14
Payer: COMMERCIAL

## 2022-11-14 ENCOUNTER — TELEPHONE (OUTPATIENT)
Dept: PAIN MEDICINE | Facility: CLINIC | Age: 57
End: 2022-11-14

## 2022-11-14 VITALS
DIASTOLIC BLOOD PRESSURE: 79 MMHG | HEART RATE: 74 BPM | OXYGEN SATURATION: 95 % | BODY MASS INDEX: 34.47 KG/M2 | WEIGHT: 194.56 LBS | HEIGHT: 63 IN | SYSTOLIC BLOOD PRESSURE: 161 MMHG

## 2022-11-14 DIAGNOSIS — M54.16 LUMBAR RADICULITIS: Primary | ICD-10-CM

## 2022-11-14 DIAGNOSIS — M54.41 CHRONIC BILATERAL LOW BACK PAIN WITH RIGHT-SIDED SCIATICA: ICD-10-CM

## 2022-11-14 DIAGNOSIS — G89.29 CHRONIC BILATERAL LOW BACK PAIN WITH RIGHT-SIDED SCIATICA: ICD-10-CM

## 2022-11-14 DIAGNOSIS — M54.16 LUMBAR RADICULOPATHY: ICD-10-CM

## 2022-11-14 PROCEDURE — 3077F PR MOST RECENT SYSTOLIC BLOOD PRESSURE >= 140 MM HG: ICD-10-PCS | Mod: CPTII,S$GLB,, | Performed by: ANESTHESIOLOGY

## 2022-11-14 PROCEDURE — 1160F PR REVIEW ALL MEDS BY PRESCRIBER/CLIN PHARMACIST DOCUMENTED: ICD-10-PCS | Mod: CPTII,S$GLB,, | Performed by: ANESTHESIOLOGY

## 2022-11-14 PROCEDURE — 3078F PR MOST RECENT DIASTOLIC BLOOD PRESSURE < 80 MM HG: ICD-10-PCS | Mod: CPTII,S$GLB,, | Performed by: ANESTHESIOLOGY

## 2022-11-14 PROCEDURE — 1159F PR MEDICATION LIST DOCUMENTED IN MEDICAL RECORD: ICD-10-PCS | Mod: CPTII,S$GLB,, | Performed by: ANESTHESIOLOGY

## 2022-11-14 PROCEDURE — 1159F MED LIST DOCD IN RCRD: CPT | Mod: CPTII,S$GLB,, | Performed by: ANESTHESIOLOGY

## 2022-11-14 PROCEDURE — 3044F HG A1C LEVEL LT 7.0%: CPT | Mod: CPTII,S$GLB,, | Performed by: ANESTHESIOLOGY

## 2022-11-14 PROCEDURE — 1160F RVW MEDS BY RX/DR IN RCRD: CPT | Mod: CPTII,S$GLB,, | Performed by: ANESTHESIOLOGY

## 2022-11-14 PROCEDURE — 3008F BODY MASS INDEX DOCD: CPT | Mod: CPTII,S$GLB,, | Performed by: ANESTHESIOLOGY

## 2022-11-14 PROCEDURE — 3078F DIAST BP <80 MM HG: CPT | Mod: CPTII,S$GLB,, | Performed by: ANESTHESIOLOGY

## 2022-11-14 PROCEDURE — 3077F SYST BP >= 140 MM HG: CPT | Mod: CPTII,S$GLB,, | Performed by: ANESTHESIOLOGY

## 2022-11-14 PROCEDURE — 99204 PR OFFICE/OUTPT VISIT, NEW, LEVL IV, 45-59 MIN: ICD-10-PCS | Mod: S$GLB,,, | Performed by: ANESTHESIOLOGY

## 2022-11-14 PROCEDURE — 3044F PR MOST RECENT HEMOGLOBIN A1C LEVEL <7.0%: ICD-10-PCS | Mod: CPTII,S$GLB,, | Performed by: ANESTHESIOLOGY

## 2022-11-14 PROCEDURE — 99204 OFFICE O/P NEW MOD 45 MIN: CPT | Mod: S$GLB,,, | Performed by: ANESTHESIOLOGY

## 2022-11-14 PROCEDURE — 99999 PR PBB SHADOW E&M-EST. PATIENT-LVL V: CPT | Mod: PBBFAC,,, | Performed by: ANESTHESIOLOGY

## 2022-11-14 PROCEDURE — 3008F PR BODY MASS INDEX (BMI) DOCUMENTED: ICD-10-PCS | Mod: CPTII,S$GLB,, | Performed by: ANESTHESIOLOGY

## 2022-11-14 PROCEDURE — 99999 PR PBB SHADOW E&M-EST. PATIENT-LVL V: ICD-10-PCS | Mod: PBBFAC,,, | Performed by: ANESTHESIOLOGY

## 2022-11-14 RX ORDER — KETOROLAC TROMETHAMINE 10 MG/1
TABLET, FILM COATED ORAL
COMMUNITY
Start: 2022-11-11

## 2022-11-14 RX ORDER — SODIUM CHLORIDE, SODIUM LACTATE, POTASSIUM CHLORIDE, CALCIUM CHLORIDE 600; 310; 30; 20 MG/100ML; MG/100ML; MG/100ML; MG/100ML
INJECTION, SOLUTION INTRAVENOUS CONTINUOUS
Status: CANCELLED | OUTPATIENT
Start: 2022-11-14

## 2022-11-14 NOTE — H&P (VIEW-ONLY)
[facility] New Patient Evaluation      Referred by: Caryrefolga Self    PCP: Dr. Swartz    CC:   Chief Complaint   Patient presents with    Back Pain    Hip Pain    Headache    Leg Pain    Neck Pain      No flowsheet data found.      HPI:   Delaney Noel is a 57 y.o. female who presents with back pain.  She is had chronic lower back pain for many years it has been gradually worsening.  Today her pain is 8/10, constant, aching, sharp with radiating pain down the back of the right leg stopping at the knee.  She denies any numbness or weakness.      Pain Intervention History:      Past Spine Surgical History:      Past and current medications:  Antineuropathics:  NSAIDs: mobic, ibuprofen, toradol  Physical therapy: yes, currently   Antidepressants: gabapentin  Muscle relaxers: robaxin  Opioids:  Antiplatelets/Anticoagulants: aspirin    History:    Current Outpatient Medications:     albuterol (PROVENTIL/VENTOLIN HFA) 90 mcg/actuation inhaler, INHALE 2 PUFFS INTO THE LUNGS EVERY 6 (SIX) HOURS AS NEEDED FOR WHEEZING. RESCUE, Disp: 18 g, Rfl: 2    atenoloL (TENORMIN) 50 MG tablet, , Disp: , Rfl:     cetirizine (ZYRTEC) 10 MG tablet, Take 10 mg by mouth once daily. Take one(1) tab by mouth daily, Disp: , Rfl:     clonazePAM (KLONOPIN) 1 MG tablet, Take 1 tablet (1 mg total) by mouth daily as needed for Anxiety., Disp: 30 tablet, Rfl: 5    dicyclomine (BENTYL) 10 MG capsule, TAKE 1 CAPSULE (10 MG TOTAL) BY MOUTH BEFORE MEALS AND AT BEDTIME AS NEEDED (ABDOMINAL PAIN DIARRHEA)., Disp: 120 capsule, Rfl: 0    EMGALITY  mg/mL PnIj, , Disp: , Rfl:     EScitalopram oxalate (LEXAPRO) 20 MG tablet, TAKE 1.5 TABLETS BY MOUTH ONCE DAILY., Disp: 135 tablet, Rfl: 3    fluticasone propionate (FLONASE) 50 mcg/actuation nasal spray, 2 sprays (100 mcg total) by Each Nostril route once daily., Disp: 16 g, Rfl: 6    gabapentin (NEURONTIN) 300 MG capsule, gabapentin 300 mg capsule  TAKE 1 CAPSULE BY MOUTH EVERYDAY AT  BEDTIME, Disp: , Rfl:     ibuprofen (ADVIL,MOTRIN) 800 MG tablet, Take 800 mg by mouth 3 (three) times daily as needed. , Disp: , Rfl:     ketorolac (TORADOL) 10 mg tablet, , Disp: , Rfl:     lamoTRIgine (LAMICTAL) 25 MG tablet, 75 mg once daily. Take three tabs(75mg) by mouth daily, Disp: , Rfl:     levothyroxine (SYNTHROID) 50 MCG tablet, TAKE 1 TABLET BY MOUTH DAILY FOR 3 DAYS THEN ON 4 TH DAY TAKE 1 AND 1/2 TABLETS BY MOUTH DAILY, Disp: 135 tablet, Rfl: 1    levothyroxine (SYNTHROID) 75 MCG tablet, TAKE 1 TABLET ONCE A DAY ON MONDAYS & WEDNESDAYS AND FRIDAYS, Disp: 36 tablet, Rfl: 9    meloxicam (MOBIC) 15 MG tablet, TAKE 1 TABLET BY MOUTH EVERY DAY, Disp: 90 tablet, Rfl: 1    methocarbamoL (ROBAXIN) 750 MG Tab, Take 1 tablet (750 mg total) by mouth nightly as needed., Disp: 90 tablet, Rfl: 2    mirtazapine (REMERON) 30 MG tablet, mirtazapine 30 mg tablet  1 tablet at bedtime; 30 MG; Once a day, Disp: , Rfl:     QUEtiapine (SEROQUEL) 50 MG tablet, Take 1 tablet (50 mg total) by mouth once daily., Disp: 90 tablet, Rfl: 1    rosuvastatin (CRESTOR) 10 MG tablet, Take 10 mg by mouth once daily. , Disp: , Rfl:     aspirin (ECOTRIN) 81 MG EC tablet, Take 1 tablet (81 mg total) by mouth once daily., Disp: , Rfl: 0    Past Medical History:   Diagnosis Date    Allergy     Depression     Hx of colonic polyps 08/04/2014    per colonoscopy report    Hypertension     Migraine     Suicidal thoughts 12/2019    [facility] tx     Thyroid disease     Hypothyroid, MNG       Past Surgical History:   Procedure Laterality Date    COLONOSCOPY      COLONOSCOPY N/A 7/1/2020    Procedure: COLONOSCOPY;  Surgeon: Thiago Lozoya Jr., MD;  Location: [facility];  Service: Endoscopy;  Laterality: N/A;    cystoscope      HYSTERECTOMY      JAMIE with BSO- age 42    OOPHORECTOMY         Family History   Problem Relation Age of Onset    Heart disease Mother         had childhood rheumatic fever    Cancer Mother         uterine cancer     Cervical cancer Mother 28    Atrial fibrillation Father     Cancer Maternal Grandmother         uterine cancer    Stroke Maternal Grandmother     Heart disease Maternal Grandmother 50        MI    Stroke Maternal Grandfather     Nephrolithiasis Neg Hx        Social History     Socioeconomic History    Marital status:     Number of children: 2   Tobacco Use    Smoking status: Former     Packs/day: 0.25     Years: 25.00     Pack years: 6.25     Types: Cigarettes     Quit date: 2015     Years since quittin.4    Smokeless tobacco: Never    Tobacco comments:     had quit x 2   Substance and Sexual Activity    Alcohol use: Yes     Alcohol/week: 2.0 standard drinks     Types: 2 Cans of beer per week     Comment: once a week     Drug use: No    Sexual activity: Yes     Partners: Male     Birth control/protection: None, See Surgical Hx     Social Determinants of Health     Financial Resource Strain: Low Risk     Difficulty of Paying Living Expenses: Not hard at all   Food Insecurity: No Food Insecurity    Worried About Running Out of Food in the Last Year: Never true    Ran Out of Food in the Last Year: Never true   Transportation Needs: No Transportation Needs    Lack of Transportation (Medical): No    Lack of Transportation (Non-Medical): No   Physical Activity: Inactive    Days of Exercise per Week: 0 days    Minutes of Exercise per Session: 0 min   Stress: Stress Concern Present    Feeling of Stress : To some extent   Social Connections: Unknown    Frequency of Communication with Friends and Family: Once a week    Frequency of Social Gatherings with Friends and Family: Never    Active Member of Clubs or Organizations: No    Attends Club or Organization Meetings: Never    Marital Status:    Housing Stability: Low Risk     Unable to Pay for Housing in the Last Year: No    Number of Places Lived in the Last Year: 1    Unstable Housing in the Last Year: No       Review of patient's allergies indicates:  "  Allergen Reactions    Hay fever and allergy relief        Review of Systems:  Positive for joint pain. Balance  of review of systems is negative.    Physical Exam:  Vitals:    11/14/22 1339   BP: (!) 161/79   Pulse: 74   SpO2: 95%   Weight: 88.3 kg (194 lb 8.9 oz)   Height: 5' 3" (1.6 m)   PainSc:   8   PainLoc: Head     Body mass index is 34.46 kg/m².    Gen: NAD  Psych: mood appropriate for given condition  HEENT: eyes anicteric   CV: RRR, 2+ radial pulse  HEENT: anicteric   Respiratory: non-labored, no signs of respiratory distress  Abd: non-distended  Skin: warm, dry and intact.  Gait:  No antalgic gait.     Sensory:  Intact and symmetrical to light touch in C4-T1 dermatomes bilaterally. Intact and symmetrical to light touch in L1-S1 dermatomes bilaterally.    Motor:    Right Left   C4 Shoulder Abduction  5  5   C5 Elbow Flexion    5  5   C6 Wrist Extension  5  5   C7 Elbow Extension   5  5   C8/T1 Hand Intrinsics   5  5        Right Left   L2/3 Iliacus Hip flexion  5  5   L3/4 Qudratus Femoris Knee Extension  5  5   L4/5 Tib Anterior Ankle Dorsiflexion   5  5   L5/S1 Extensor Hallicus Longus Great toe extension  5  5   S1/S2 Gastroc/Soleus Plantar Flexion  5  5      Right Left   Triceps DTR 2+ 2+   Biceps DTR 2+ 2+   Brachioradialis DTR 2+ 2+   Patellar DTR 2+ 2+   Achilles DTR 2+ 2+   Cuevas Absent  Absent                 Imaging:  MRI lumbar spine 11/8/22  FINDINGS:  Alignment: Mild levoconvex curvature of the lumbar spine.     Vertebral column: Vertebral body heights are maintained.  No evidence of an acute fracture or aggressive marrow replacement process. Multilevel disc degeneration, most pronounced at L5-S1 with marked disc space narrowing, degenerative endplate change and marginal osteophyte formation.  Stable probable hemangioma within the L3 vertebral body.     Cord: Normal.  Conus terminates at L1.     Degenerative findings:     T12-L1: No significant disc bulge.  Mild bilateral facet arthropathy. "  No neural foraminal or spinal canal stenosis.  L1-L2: No significant disc abnormality.  Mild bilateral facet arthropathy.  There is no neural foraminal stenosis.  There is no spinal canal stenosis.  L2-L3: No significant disc abnormality.  Mild bilateral facet arthropathy.  There is no neural foraminal stenosis.  There is no spinal canal stenosis.  L3-L4: Minimal diffuse disc bulge.  Mild bilateral facet arthropathy and ligamentum flavum thickening.  Mild left neural foraminal stenosis.  There is no spinal canal stenosis.  L4-L5: Mild diffuse disc bulge with mild broad-based right extraforaminal disc protrusion through an annular fissure.  Moderate bilateral facet arthropathy.  Ligamentum flavum thickening.  Mild bilateral neural foraminal stenosis.  Mild spinal canal stenosis.  L5-S1: Marked disc space narrowing with osteophytic ridging and diffuse disc herniation.  Moderate bilateral facet arthropathy.  Ligamentum flavum thickening.  Moderate left and mild-to-moderate right neural foraminal stenosis.  Mild spinal canal stenosis.     Paraspinal muscles & soft tissues: No significant abnormality.    Labs:  Lab Results   Component Value Date    HGBA1C 5.4 10/13/2022       Lab Results   Component Value Date    WBC 6.30 10/13/2022    HGB 14.3 10/13/2022    HCT 43.8 10/13/2022    MCV 91 10/13/2022     10/13/2022           Assessment:   Problem List Items Addressed This Visit    None  Visit Diagnoses       Lumbar radiculitis    -  Primary    Chronic bilateral low back pain with right-sided sciatica                57 y.o. year old female with PMH bipolar, HTN, hypothyroidism who presents with back pain.  She is had chronic lower back pain for many years it has been gradually worsening.  Today her pain is 8/10, constant, aching, sharp with radiating pain down the back of the right leg stopping at the knee.  She denies any numbness or weakness.    - on exam she is neurologically intact  - I independently reviewed  her lumbar MRI is consistent with multilevel bilateral facet arthropathy worse at L4-5 and L5-S1 as well as diffuse central disc herniation at L5-S1 causing some mild central canal narrowing and bilateral foraminal narrowing  - over the past 6 weeks she has been participating in formal physical therapy and she has also completed formal physical therapy multiple times in the past.  She has also been taking anti-inflammatories and muscle relaxants but continues to have pain that is limiting her mobility and interfering with her daily living  - we will schedule for an L5-S1 ANUJA.  The risks and benefits of this intervention, and alternative therapies were discussed with the patient.  The discussion of risks included infection, bleeding, need for additional procedures or surgery, nerve damage.  Questions regarding the procedure, risks, expected outcome, and possible side effects were solicited and answered to the patient's satisfaction.  Delaney Noel wishes to proceed with the injection or procedure.  Written consent was obtained.  - follow-up 2 weeks post injection    : Not applicable    Chandler Pereira M.D.  Interventional Pain Medicine / Anesthesiology    This note was completed with dictation software and grammatical errors may exist.

## 2022-11-14 NOTE — TELEPHONE ENCOUNTER
This patient will be scheduled for a lumbar epidural with Dr. Pereira. She will need to hold ASA x7 days. Please advise if okay to hold. Thanks!

## 2022-11-14 NOTE — PROGRESS NOTES
[facility] New Patient Evaluation      Referred by: Caryrefolga Self    PCP: Dr. Swartz    CC:   Chief Complaint   Patient presents with    Back Pain    Hip Pain    Headache    Leg Pain    Neck Pain      No flowsheet data found.      HPI:   Delaney Noel is a 57 y.o. female who presents with back pain.  She is had chronic lower back pain for many years it has been gradually worsening.  Today her pain is 8/10, constant, aching, sharp with radiating pain down the back of the right leg stopping at the knee.  She denies any numbness or weakness.      Pain Intervention History:      Past Spine Surgical History:      Past and current medications:  Antineuropathics:  NSAIDs: mobic, ibuprofen, toradol  Physical therapy: yes, currently   Antidepressants: gabapentin  Muscle relaxers: robaxin  Opioids:  Antiplatelets/Anticoagulants: aspirin    History:    Current Outpatient Medications:     albuterol (PROVENTIL/VENTOLIN HFA) 90 mcg/actuation inhaler, INHALE 2 PUFFS INTO THE LUNGS EVERY 6 (SIX) HOURS AS NEEDED FOR WHEEZING. RESCUE, Disp: 18 g, Rfl: 2    atenoloL (TENORMIN) 50 MG tablet, , Disp: , Rfl:     cetirizine (ZYRTEC) 10 MG tablet, Take 10 mg by mouth once daily. Take one(1) tab by mouth daily, Disp: , Rfl:     clonazePAM (KLONOPIN) 1 MG tablet, Take 1 tablet (1 mg total) by mouth daily as needed for Anxiety., Disp: 30 tablet, Rfl: 5    dicyclomine (BENTYL) 10 MG capsule, TAKE 1 CAPSULE (10 MG TOTAL) BY MOUTH BEFORE MEALS AND AT BEDTIME AS NEEDED (ABDOMINAL PAIN DIARRHEA)., Disp: 120 capsule, Rfl: 0    EMGALITY  mg/mL PnIj, , Disp: , Rfl:     EScitalopram oxalate (LEXAPRO) 20 MG tablet, TAKE 1.5 TABLETS BY MOUTH ONCE DAILY., Disp: 135 tablet, Rfl: 3    fluticasone propionate (FLONASE) 50 mcg/actuation nasal spray, 2 sprays (100 mcg total) by Each Nostril route once daily., Disp: 16 g, Rfl: 6    gabapentin (NEURONTIN) 300 MG capsule, gabapentin 300 mg capsule  TAKE 1 CAPSULE BY MOUTH EVERYDAY AT  BEDTIME, Disp: , Rfl:     ibuprofen (ADVIL,MOTRIN) 800 MG tablet, Take 800 mg by mouth 3 (three) times daily as needed. , Disp: , Rfl:     ketorolac (TORADOL) 10 mg tablet, , Disp: , Rfl:     lamoTRIgine (LAMICTAL) 25 MG tablet, 75 mg once daily. Take three tabs(75mg) by mouth daily, Disp: , Rfl:     levothyroxine (SYNTHROID) 50 MCG tablet, TAKE 1 TABLET BY MOUTH DAILY FOR 3 DAYS THEN ON 4 TH DAY TAKE 1 AND 1/2 TABLETS BY MOUTH DAILY, Disp: 135 tablet, Rfl: 1    levothyroxine (SYNTHROID) 75 MCG tablet, TAKE 1 TABLET ONCE A DAY ON MONDAYS & WEDNESDAYS AND FRIDAYS, Disp: 36 tablet, Rfl: 9    meloxicam (MOBIC) 15 MG tablet, TAKE 1 TABLET BY MOUTH EVERY DAY, Disp: 90 tablet, Rfl: 1    methocarbamoL (ROBAXIN) 750 MG Tab, Take 1 tablet (750 mg total) by mouth nightly as needed., Disp: 90 tablet, Rfl: 2    mirtazapine (REMERON) 30 MG tablet, mirtazapine 30 mg tablet  1 tablet at bedtime; 30 MG; Once a day, Disp: , Rfl:     QUEtiapine (SEROQUEL) 50 MG tablet, Take 1 tablet (50 mg total) by mouth once daily., Disp: 90 tablet, Rfl: 1    rosuvastatin (CRESTOR) 10 MG tablet, Take 10 mg by mouth once daily. , Disp: , Rfl:     aspirin (ECOTRIN) 81 MG EC tablet, Take 1 tablet (81 mg total) by mouth once daily., Disp: , Rfl: 0    Past Medical History:   Diagnosis Date    Allergy     Depression     Hx of colonic polyps 08/04/2014    per colonoscopy report    Hypertension     Migraine     Suicidal thoughts 12/2019    [facility] tx     Thyroid disease     Hypothyroid, MNG       Past Surgical History:   Procedure Laterality Date    COLONOSCOPY      COLONOSCOPY N/A 7/1/2020    Procedure: COLONOSCOPY;  Surgeon: Thiago Lozoya Jr., MD;  Location: [facility];  Service: Endoscopy;  Laterality: N/A;    cystoscope      HYSTERECTOMY      JAMIE with BSO- age 42    OOPHORECTOMY         Family History   Problem Relation Age of Onset    Heart disease Mother         had childhood rheumatic fever    Cancer Mother         uterine cancer     Cervical cancer Mother 28    Atrial fibrillation Father     Cancer Maternal Grandmother         uterine cancer    Stroke Maternal Grandmother     Heart disease Maternal Grandmother 50        MI    Stroke Maternal Grandfather     Nephrolithiasis Neg Hx        Social History     Socioeconomic History    Marital status:     Number of children: 2   Tobacco Use    Smoking status: Former     Packs/day: 0.25     Years: 25.00     Pack years: 6.25     Types: Cigarettes     Quit date: 2015     Years since quittin.4    Smokeless tobacco: Never    Tobacco comments:     had quit x 2   Substance and Sexual Activity    Alcohol use: Yes     Alcohol/week: 2.0 standard drinks     Types: 2 Cans of beer per week     Comment: once a week     Drug use: No    Sexual activity: Yes     Partners: Male     Birth control/protection: None, See Surgical Hx     Social Determinants of Health     Financial Resource Strain: Low Risk     Difficulty of Paying Living Expenses: Not hard at all   Food Insecurity: No Food Insecurity    Worried About Running Out of Food in the Last Year: Never true    Ran Out of Food in the Last Year: Never true   Transportation Needs: No Transportation Needs    Lack of Transportation (Medical): No    Lack of Transportation (Non-Medical): No   Physical Activity: Inactive    Days of Exercise per Week: 0 days    Minutes of Exercise per Session: 0 min   Stress: Stress Concern Present    Feeling of Stress : To some extent   Social Connections: Unknown    Frequency of Communication with Friends and Family: Once a week    Frequency of Social Gatherings with Friends and Family: Never    Active Member of Clubs or Organizations: No    Attends Club or Organization Meetings: Never    Marital Status:    Housing Stability: Low Risk     Unable to Pay for Housing in the Last Year: No    Number of Places Lived in the Last Year: 1    Unstable Housing in the Last Year: No       Review of patient's allergies indicates:  "  Allergen Reactions    Hay fever and allergy relief        Review of Systems:  Positive for joint pain. Balance  of review of systems is negative.    Physical Exam:  Vitals:    11/14/22 1339   BP: (!) 161/79   Pulse: 74   SpO2: 95%   Weight: 88.3 kg (194 lb 8.9 oz)   Height: 5' 3" (1.6 m)   PainSc:   8   PainLoc: Head     Body mass index is 34.46 kg/m².    Gen: NAD  Psych: mood appropriate for given condition  HEENT: eyes anicteric   CV: RRR, 2+ radial pulse  HEENT: anicteric   Respiratory: non-labored, no signs of respiratory distress  Abd: non-distended  Skin: warm, dry and intact.  Gait:  No antalgic gait.     Sensory:  Intact and symmetrical to light touch in C4-T1 dermatomes bilaterally. Intact and symmetrical to light touch in L1-S1 dermatomes bilaterally.    Motor:    Right Left   C4 Shoulder Abduction  5  5   C5 Elbow Flexion    5  5   C6 Wrist Extension  5  5   C7 Elbow Extension   5  5   C8/T1 Hand Intrinsics   5  5        Right Left   L2/3 Iliacus Hip flexion  5  5   L3/4 Qudratus Femoris Knee Extension  5  5   L4/5 Tib Anterior Ankle Dorsiflexion   5  5   L5/S1 Extensor Hallicus Longus Great toe extension  5  5   S1/S2 Gastroc/Soleus Plantar Flexion  5  5      Right Left   Triceps DTR 2+ 2+   Biceps DTR 2+ 2+   Brachioradialis DTR 2+ 2+   Patellar DTR 2+ 2+   Achilles DTR 2+ 2+   Cuevas Absent  Absent                 Imaging:  MRI lumbar spine 11/8/22  FINDINGS:  Alignment: Mild levoconvex curvature of the lumbar spine.     Vertebral column: Vertebral body heights are maintained.  No evidence of an acute fracture or aggressive marrow replacement process. Multilevel disc degeneration, most pronounced at L5-S1 with marked disc space narrowing, degenerative endplate change and marginal osteophyte formation.  Stable probable hemangioma within the L3 vertebral body.     Cord: Normal.  Conus terminates at L1.     Degenerative findings:     T12-L1: No significant disc bulge.  Mild bilateral facet arthropathy. "  No neural foraminal or spinal canal stenosis.  L1-L2: No significant disc abnormality.  Mild bilateral facet arthropathy.  There is no neural foraminal stenosis.  There is no spinal canal stenosis.  L2-L3: No significant disc abnormality.  Mild bilateral facet arthropathy.  There is no neural foraminal stenosis.  There is no spinal canal stenosis.  L3-L4: Minimal diffuse disc bulge.  Mild bilateral facet arthropathy and ligamentum flavum thickening.  Mild left neural foraminal stenosis.  There is no spinal canal stenosis.  L4-L5: Mild diffuse disc bulge with mild broad-based right extraforaminal disc protrusion through an annular fissure.  Moderate bilateral facet arthropathy.  Ligamentum flavum thickening.  Mild bilateral neural foraminal stenosis.  Mild spinal canal stenosis.  L5-S1: Marked disc space narrowing with osteophytic ridging and diffuse disc herniation.  Moderate bilateral facet arthropathy.  Ligamentum flavum thickening.  Moderate left and mild-to-moderate right neural foraminal stenosis.  Mild spinal canal stenosis.     Paraspinal muscles & soft tissues: No significant abnormality.    Labs:  Lab Results   Component Value Date    HGBA1C 5.4 10/13/2022       Lab Results   Component Value Date    WBC 6.30 10/13/2022    HGB 14.3 10/13/2022    HCT 43.8 10/13/2022    MCV 91 10/13/2022     10/13/2022           Assessment:   Problem List Items Addressed This Visit    None  Visit Diagnoses       Lumbar radiculitis    -  Primary    Chronic bilateral low back pain with right-sided sciatica                57 y.o. year old female with PMH bipolar, HTN, hypothyroidism who presents with back pain.  She is had chronic lower back pain for many years it has been gradually worsening.  Today her pain is 8/10, constant, aching, sharp with radiating pain down the back of the right leg stopping at the knee.  She denies any numbness or weakness.    - on exam she is neurologically intact  - I independently reviewed  her lumbar MRI is consistent with multilevel bilateral facet arthropathy worse at L4-5 and L5-S1 as well as diffuse central disc herniation at L5-S1 causing some mild central canal narrowing and bilateral foraminal narrowing  - over the past 6 weeks she has been participating in formal physical therapy and she has also completed formal physical therapy multiple times in the past.  She has also been taking anti-inflammatories and muscle relaxants but continues to have pain that is limiting her mobility and interfering with her daily living  - we will schedule for an L5-S1 ANUJA.  The risks and benefits of this intervention, and alternative therapies were discussed with the patient.  The discussion of risks included infection, bleeding, need for additional procedures or surgery, nerve damage.  Questions regarding the procedure, risks, expected outcome, and possible side effects were solicited and answered to the patient's satisfaction.  Delaney Noel wishes to proceed with the injection or procedure.  Written consent was obtained.  - follow-up 2 weeks post injection    : Not applicable    Chandler Pereira M.D.  Interventional Pain Medicine / Anesthesiology    This note was completed with dictation software and grammatical errors may exist.

## 2022-11-15 NOTE — TELEPHONE ENCOUNTER
Call placed to Pt to advise per Yesi Bolton, it is fine to hold aspirin for 7 days. No answer. V/M left.

## 2022-11-18 ENCOUNTER — TELEPHONE (OUTPATIENT)
Dept: SURGERY | Facility: HOSPITAL | Age: 57
End: 2022-11-18
Payer: COMMERCIAL

## 2022-11-18 NOTE — TELEPHONE ENCOUNTER
Good afternoon,    Patient Scheduled for lumbar ANUJA on 11/23. Patient last took ASA 11/17. Please contact patient should this interfere with procedure on 11/23. Thank you!

## 2022-11-21 NOTE — TELEPHONE ENCOUNTER
Call placed to Pt to advise per Dr. Devries that Ok to proceed with ANUJA on 11-23-22. No answer. V/m left.

## 2022-11-23 ENCOUNTER — HOSPITAL ENCOUNTER (OUTPATIENT)
Facility: HOSPITAL | Age: 57
Discharge: HOME | End: 2022-11-23
Attending: ANESTHESIOLOGY | Admitting: ANESTHESIOLOGY
Payer: COMMERCIAL

## 2022-11-23 ENCOUNTER — HOSPITAL ENCOUNTER (OUTPATIENT)
Dept: RADIOLOGY | Facility: HOSPITAL | Age: 57
Discharge: HOME | End: 2022-11-23
Attending: ANESTHESIOLOGY | Admitting: ANESTHESIOLOGY
Payer: COMMERCIAL

## 2022-11-23 VITALS
RESPIRATION RATE: 16 BRPM | WEIGHT: 194 LBS | SYSTOLIC BLOOD PRESSURE: 120 MMHG | DIASTOLIC BLOOD PRESSURE: 60 MMHG | OXYGEN SATURATION: 96 % | HEART RATE: 69 BPM | BODY MASS INDEX: 34.38 KG/M2 | TEMPERATURE: 98 F | HEIGHT: 63 IN

## 2022-11-23 DIAGNOSIS — M54.50 LOWER BACK PAIN: ICD-10-CM

## 2022-11-23 DIAGNOSIS — M54.16 LUMBAR RADICULOPATHY: Primary | ICD-10-CM

## 2022-11-23 PROCEDURE — 62323 NJX INTERLAMINAR LMBR/SAC: CPT | Mod: PO | Performed by: ANESTHESIOLOGY

## 2022-11-23 PROCEDURE — 62323 NJX INTERLAMINAR LMBR/SAC: CPT | Mod: ,,, | Performed by: ANESTHESIOLOGY

## 2022-11-23 PROCEDURE — 25500020 PHARM REV CODE 255: Mod: PO | Performed by: ANESTHESIOLOGY

## 2022-11-23 PROCEDURE — A4216 STERILE WATER/SALINE, 10 ML: HCPCS | Mod: PO | Performed by: ANESTHESIOLOGY

## 2022-11-23 PROCEDURE — 76000 FLUOROSCOPY <1 HR PHYS/QHP: CPT | Mod: TC,PO

## 2022-11-23 PROCEDURE — 25000003 PHARM REV CODE 250: Mod: PO | Performed by: ANESTHESIOLOGY

## 2022-11-23 PROCEDURE — 62323 PR INJ LUMBAR/SACRAL, W/IMAGING GUIDANCE: ICD-10-PCS | Mod: ,,, | Performed by: ANESTHESIOLOGY

## 2022-11-23 PROCEDURE — 63600175 PHARM REV CODE 636 W HCPCS: Mod: PO | Performed by: ANESTHESIOLOGY

## 2022-11-23 RX ORDER — SODIUM CHLORIDE, SODIUM LACTATE, POTASSIUM CHLORIDE, CALCIUM CHLORIDE 600; 310; 30; 20 MG/100ML; MG/100ML; MG/100ML; MG/100ML
INJECTION, SOLUTION INTRAVENOUS CONTINUOUS
Status: DISCONTINUED | OUTPATIENT
Start: 2022-11-23 | End: 2022-11-23 | Stop reason: HOSPADM

## 2022-11-23 RX ORDER — LIDOCAINE HYDROCHLORIDE 10 MG/ML
INJECTION, SOLUTION EPIDURAL; INFILTRATION; INTRACAUDAL; PERINEURAL
Status: DISCONTINUED | OUTPATIENT
Start: 2022-11-23 | End: 2022-11-23 | Stop reason: HOSPADM

## 2022-11-23 RX ORDER — SODIUM CHLORIDE 9 MG/ML
INJECTION, SOLUTION INTRAMUSCULAR; INTRAVENOUS; SUBCUTANEOUS
Status: DISCONTINUED | OUTPATIENT
Start: 2022-11-23 | End: 2022-11-23 | Stop reason: HOSPADM

## 2022-11-23 RX ORDER — MIDAZOLAM HYDROCHLORIDE 2 MG/2ML
INJECTION, SOLUTION INTRAMUSCULAR; INTRAVENOUS
Status: DISCONTINUED | OUTPATIENT
Start: 2022-11-23 | End: 2022-11-23 | Stop reason: HOSPADM

## 2022-11-23 RX ORDER — METHYLPREDNISOLONE ACETATE 80 MG/ML
INJECTION, SUSPENSION INTRA-ARTICULAR; INTRALESIONAL; INTRAMUSCULAR; SOFT TISSUE
Status: DISCONTINUED | OUTPATIENT
Start: 2022-11-23 | End: 2022-11-23 | Stop reason: HOSPADM

## 2022-11-23 RX ADMIN — SODIUM CHLORIDE, SODIUM LACTATE, POTASSIUM CHLORIDE, AND CALCIUM CHLORIDE: .6; .31; .03; .02 INJECTION, SOLUTION INTRAVENOUS at 09:11

## 2022-11-23 NOTE — DISCHARGE INSTRUCTIONS

## 2022-11-23 NOTE — OP NOTE
"Procedure Note    Procedure Date: 11/23/2022    Procedure Performed:  L5/S1 lumbar interlaminar epidural steroid injection under fluoroscopy.    Indications: Patient has failed conservative therapy.      Pre-op diagnosis: Lumbar Radiculopathy    Post-op diagnosis: same    Physician: Chandler Pereira MD    IV anxiolysis medications: versed mg    Medications injected: depomedrol 80mg, 1% Lidocaine 1ml, 2 mL sterile, preservative-free normal saline.    Local anesthetic used: 1% Lidocaine, 1 ml    Estimated Blood Loss: non    Complications:  on    Technique:  The patient was interviewed in the holding area and Risks/Benefits were discussed, including, but not limited to, the possibility of new or different pain, bleeding or infection.   All questions were answered.  The patient understood and accepted risks.  Consent was verfied.  A time-out was taken to identify patient and procedure prior to starting the procedure. The patient was placed in the prone position on the fluoroscopy table. The area of the lumbar spine was prepped with Chloraprep x2 and draped in a sterile manner. The L5/S1 interspace was identified and marked under AP fluoroscopy. The skin and subcutaneous tissues overlying the targeted interspace were anesthetized with 3-5 mL of 1% lidocaine using a 25G, 1.5" needle.  A 20G, 3.5" Tuohy epidural needle was directed toward the interspace under fluoroscopic guidance until the ligamentum flavum was engaged. From this point, a loss of resistance technique with a glass syringe and saline was used to identify entrance of the needle into the epidural space. Once loss of resistance was observed 1 mL of contrast solution was injected. An appropriate epidurogram was noted.  A 4 mL mixture consisting of saline, 1 mL 1% Lidocaine and 80 mg of depomedrol was injected slowly and without resistance.  The needle was flushed with normal saline and removed. The contrast was seen to be displaced after injection. Patient was " awake/responsive during all injections.  The patient tolerated the procedure well and was transferred to the .AC.. in stable condition.  The patient was monitored after the procedure and was given post-procedure and discharge instructions to follow at home. The patient was discharged in a stable condition.

## 2022-11-23 NOTE — DISCHARGE SUMMARY
[facility]  Discharge Note  Short Stay    Admit Date: 11/23/2022    Discharge Date: 11/23/2022    Attending Physician: Chandler Pereira     Discharge Provider: Chandler Pereira    Diagnoses:  There are no hospital problems to display for this patient.      Discharged Condition: Good    Final Diagnoses: Lumbar radiculopathy [M54.16]    Disposition: Home or Self Care    Hospital Course: No complications, uneventful    Outcome of Hospitalization, Treatment, Procedure, or Surgery:  Patient was admitted for outpatient interventional pain management procedure. The patient tolerated the procedure well with no complications.    Follow up/Patient Instructions:  Follow up as scheduled in Pain Management office in 2-3 weeks.  Patient has received instructions and follow up date and time.    Medications:  Continue previous medications, except restart aspirin in 24 hours    Discharge Procedure Orders   Notify your health care provider if you experience any of the following:  temperature >100.4     Notify your health care provider if you experience any of the following:  persistent nausea and vomiting or diarrhea     Notify your health care provider if you experience any of the following:  severe uncontrolled pain     Notify your health care provider if you experience any of the following:  redness, tenderness, or signs of infection (pain, swelling, redness, odor or green/yellow discharge around incision site)     Notify your health care provider if you experience any of the following:  difficulty breathing or increased cough     Notify your health care provider if you experience any of the following:  severe persistent headache     Notify your health care provider if you experience any of the following:  worsening rash     Notify your health care provider if you experience any of the following:  persistent dizziness, light-headedness, or visual disturbances     Notify your health care provider if you experience any of the  following:  increased confusion or weakness     Activity as tolerated

## 2023-01-07 DIAGNOSIS — K52.9 CHRONIC DIARRHEA: Primary | ICD-10-CM

## 2023-01-08 ENCOUNTER — PATIENT MESSAGE (OUTPATIENT)
Dept: PAIN MEDICINE | Facility: CLINIC | Age: 58
End: 2023-01-08
Payer: COMMERCIAL

## 2023-01-09 ENCOUNTER — OFFICE VISIT (OUTPATIENT)
Dept: PAIN MEDICINE | Facility: CLINIC | Age: 58
End: 2023-01-09
Payer: COMMERCIAL

## 2023-01-09 DIAGNOSIS — M54.16 LUMBAR RADICULITIS: Primary | ICD-10-CM

## 2023-01-09 DIAGNOSIS — M54.41 CHRONIC BILATERAL LOW BACK PAIN WITH RIGHT-SIDED SCIATICA: ICD-10-CM

## 2023-01-09 DIAGNOSIS — M47.816 LUMBAR SPONDYLOSIS: ICD-10-CM

## 2023-01-09 DIAGNOSIS — G89.29 CHRONIC BILATERAL LOW BACK PAIN WITH RIGHT-SIDED SCIATICA: ICD-10-CM

## 2023-01-09 DIAGNOSIS — M54.16 LUMBAR RADICULOPATHY: ICD-10-CM

## 2023-01-09 PROCEDURE — 99213 OFFICE O/P EST LOW 20 MIN: CPT | Mod: 95,,,

## 2023-01-09 PROCEDURE — 99213 PR OFFICE/OUTPT VISIT, EST, LEVL III, 20-29 MIN: ICD-10-PCS | Mod: 95,,,

## 2023-01-09 NOTE — PROGRESS NOTES
The patient location is:  [place]  The chief complaint leading to consultation is:  Low back pain    Visit type: audiovisual    Face to Face time with patient:  7 minutes  20 minutes minutes of total time spent on the encounter, which includes face to face time and non-face to face time preparing to see the patient (eg, review of tests), Obtaining and/or reviewing separately obtained history, Documenting clinical information in the electronic or other health record, Independently interpreting results (not separately reported) and communicating results to the patient/family/caregiver, or Care coordination (not separately reported).         Each patient to whom he or she provides medical services by telemedicine is:  (1) informed of the relationship between the physician and patient and the respective role of any other health care provider with respect to management of the patient; and (2) notified that he or she may decline to receive medical services by telemedicine and may withdraw from such care at any time.    Notes:         [facility] Follow Up Evaluation      Referred by: No ref. provider found    PCP: Dr. Swartz    CC:   No chief complaint on file.     No flowsheet data found.    Interval HPI 1/9/2023: Delaney Noel returns for virtual follow-up.  She is s/p L5/S1 ANUJA on 11/23/2022 with initially 100% relief and now 30% relief.  She does report complete resolution of her previous right-sided radicular pain.  However she is reporting continued lower back pain today, 5/10, located across her belt line and tailbone, that is worsened with standing for extended periods of time and cooking and somewhat alleviated with rest.  She denies any radiating pain, new numbness, weakness or any changes to her bowel bladder function.      HPI:   Delaney Noel is a 57 y.o. female who presents with back pain.  She is had chronic lower back pain for many years it has been gradually worsening.  Today her pain is  8/10, constant, aching, sharp with radiating pain down the back of the right leg stopping at the knee.  She denies any numbness or weakness.      Pain Intervention History:  - s/p L5/S1 ANUJA on 11/23/2022 with initially 100% relief and now 30% relief.    Past Spine Surgical History:      Past and current medications:  Antineuropathics:  NSAIDs: mobic, ibuprofen, toradol  Physical therapy: yes, currently   Antidepressants: gabapentin  Muscle relaxers: robaxin  Opioids:  Antiplatelets/Anticoagulants: aspirin    History:    Current Outpatient Medications:     albuterol (PROVENTIL/VENTOLIN HFA) 90 mcg/actuation inhaler, INHALE 2 PUFFS INTO THE LUNGS EVERY 6 (SIX) HOURS AS NEEDED FOR WHEEZING. RESCUE, Disp: 18 g, Rfl: 2    aspirin (ECOTRIN) 81 MG EC tablet, Take 1 tablet (81 mg total) by mouth once daily., Disp: , Rfl: 0    atenoloL (TENORMIN) 50 MG tablet, , Disp: , Rfl:     cetirizine (ZYRTEC) 10 MG tablet, Take 10 mg by mouth once daily. Take one(1) tab by mouth daily, Disp: , Rfl:     clonazePAM (KLONOPIN) 1 MG tablet, Take 1 tablet (1 mg total) by mouth daily as needed for Anxiety., Disp: 30 tablet, Rfl: 5    dicyclomine (BENTYL) 10 MG capsule, TAKE 1 CAPSULE BY MOUTH BEFORE MEALS AND AT BEDTIME AS NEEDED (ABDOMINAL PAIN DIARRHEA)., Disp: 120 capsule, Rfl: 0    EMGALITY  mg/mL PnIj, , Disp: , Rfl:     EScitalopram oxalate (LEXAPRO) 20 MG tablet, TAKE 1.5 TABLETS BY MOUTH ONCE DAILY., Disp: 135 tablet, Rfl: 3    fluticasone propionate (FLONASE) 50 mcg/actuation nasal spray, 2 sprays (100 mcg total) by Each Nostril route once daily., Disp: 16 g, Rfl: 6    gabapentin (NEURONTIN) 300 MG capsule, gabapentin 300 mg capsule  TAKE 1 CAPSULE BY MOUTH EVERYDAY AT BEDTIME, Disp: , Rfl:     ibuprofen (ADVIL,MOTRIN) 800 MG tablet, Take 800 mg by mouth 3 (three) times daily as needed. , Disp: , Rfl:     ketorolac (TORADOL) 10 mg tablet, , Disp: , Rfl:     lamoTRIgine (LAMICTAL) 25 MG tablet, 75 mg once daily. Take three  tabs(75mg) by mouth daily, Disp: , Rfl:     levothyroxine (SYNTHROID) 50 MCG tablet, TAKE 1 TABLET BY MOUTH DAILY FOR 3 DAYS THEN ON 4 TH DAY TAKE 1 AND 1/2 TABLETS BY MOUTH DAILY, Disp: 135 tablet, Rfl: 1    levothyroxine (SYNTHROID) 75 MCG tablet, TAKE 1 TABLET ONCE A DAY ON MONDAYS & WEDNESDAYS AND FRIDAYS, Disp: 36 tablet, Rfl: 9    meloxicam (MOBIC) 15 MG tablet, TAKE 1 TABLET BY MOUTH EVERY DAY, Disp: 90 tablet, Rfl: 1    methocarbamoL (ROBAXIN) 750 MG Tab, Take 1 tablet (750 mg total) by mouth nightly as needed., Disp: 90 tablet, Rfl: 2    mirtazapine (REMERON) 30 MG tablet, mirtazapine 30 mg tablet  1 tablet at bedtime; 30 MG; Once a day, Disp: , Rfl:     QUEtiapine (SEROQUEL) 50 MG tablet, Take 1 tablet (50 mg total) by mouth once daily., Disp: 90 tablet, Rfl: 1    rosuvastatin (CRESTOR) 10 MG tablet, Take 10 mg by mouth once daily. , Disp: , Rfl:     Past Medical History:   Diagnosis Date    Allergy     Depression     Hx of colonic polyps 08/04/2014    per colonoscopy report    Hypertension     Migraine     Suicidal thoughts 12/2019    [facility] tx     Thyroid disease     Hypothyroid, MNG       Past Surgical History:   Procedure Laterality Date    COLONOSCOPY      COLONOSCOPY N/A 7/1/2020    Procedure: COLONOSCOPY;  Surgeon: Thiago Lozoya Jr., MD;  Location: [facility] ENDO;  Service: Endoscopy;  Laterality: N/A;    cystoscope      EPIDURAL STEROID INJECTION INTO LUMBAR SPINE N/A 11/23/2022    Procedure: Injection-steroid-epidural-lumbar L5/S1;  Surgeon: Chandler Pereira MD;  Location: [facility] OR;  Service: Pain Management;  Laterality: N/A;    HYSTERECTOMY      JAMIE with BSO- age 42    OOPHORECTOMY         Family History   Problem Relation Age of Onset    Heart disease Mother         had childhood rheumatic fever    Cancer Mother         uterine cancer    Cervical cancer Mother 28    Atrial fibrillation Father     Cancer Maternal Grandmother         uterine cancer    Stroke Maternal Grandmother     Heart  disease Maternal Grandmother 50        MI    Stroke Maternal Grandfather     Nephrolithiasis Neg Hx        Social History     Socioeconomic History    Marital status:     Number of children: 2   Tobacco Use    Smoking status: Former     Packs/day: 0.25     Years: 25.00     Pack years: 6.25     Types: Cigarettes     Quit date: 2015     Years since quittin.6    Smokeless tobacco: Never    Tobacco comments:     had quit x 2   Substance and Sexual Activity    Alcohol use: Yes     Alcohol/week: 2.0 standard drinks     Types: 2 Cans of beer per week     Comment: once a week     Drug use: No    Sexual activity: Yes     Partners: Male     Birth control/protection: None, See Surgical Hx     Social Determinants of Health     Financial Resource Strain: Low Risk     Difficulty of Paying Living Expenses: Not hard at all   Food Insecurity: No Food Insecurity    Worried About Running Out of Food in the Last Year: Never true    Ran Out of Food in the Last Year: Never true   Transportation Needs: No Transportation Needs    Lack of Transportation (Medical): No    Lack of Transportation (Non-Medical): No   Physical Activity: Inactive    Days of Exercise per Week: 0 days    Minutes of Exercise per Session: 0 min   Stress: Stress Concern Present    Feeling of Stress : To some extent   Social Connections: Unknown    Frequency of Communication with Friends and Family: Once a week    Frequency of Social Gatherings with Friends and Family: Never    Active Member of Clubs or Organizations: No    Attends Club or Organization Meetings: Never    Marital Status:    Housing Stability: Low Risk     Unable to Pay for Housing in the Last Year: No    Number of Places Lived in the Last Year: 1    Unstable Housing in the Last Year: No       Review of patient's allergies indicates:   Allergen Reactions    Hay fever and allergy relief        Review of Systems:  Positive for joint pain. Balance  of review of systems is  negative.    Physical Exam:  There were no vitals filed for this visit.    There is no height or weight on file to calculate BMI.    Gen: NAD  Psych: mood appropriate for given condition      Imaging:  MRI lumbar spine 11/8/22  FINDINGS:  Alignment: Mild levoconvex curvature of the lumbar spine.     Vertebral column: Vertebral body heights are maintained.  No evidence of an acute fracture or aggressive marrow replacement process. Multilevel disc degeneration, most pronounced at L5-S1 with marked disc space narrowing, degenerative endplate change and marginal osteophyte formation.  Stable probable hemangioma within the L3 vertebral body.     Cord: Normal.  Conus terminates at L1.     Degenerative findings:     T12-L1: No significant disc bulge.  Mild bilateral facet arthropathy.  No neural foraminal or spinal canal stenosis.  L1-L2: No significant disc abnormality.  Mild bilateral facet arthropathy.  There is no neural foraminal stenosis.  There is no spinal canal stenosis.  L2-L3: No significant disc abnormality.  Mild bilateral facet arthropathy.  There is no neural foraminal stenosis.  There is no spinal canal stenosis.  L3-L4: Minimal diffuse disc bulge.  Mild bilateral facet arthropathy and ligamentum flavum thickening.  Mild left neural foraminal stenosis.  There is no spinal canal stenosis.  L4-L5: Mild diffuse disc bulge with mild broad-based right extraforaminal disc protrusion through an annular fissure.  Moderate bilateral facet arthropathy.  Ligamentum flavum thickening.  Mild bilateral neural foraminal stenosis.  Mild spinal canal stenosis.  L5-S1: Marked disc space narrowing with osteophytic ridging and diffuse disc herniation.  Moderate bilateral facet arthropathy.  Ligamentum flavum thickening.  Moderate left and mild-to-moderate right neural foraminal stenosis.  Mild spinal canal stenosis.     Paraspinal muscles & soft tissues: No significant abnormality.    Labs:  Lab Results   Component Value Date     HGBA1C 5.4 10/13/2022       Lab Results   Component Value Date    WBC 6.30 10/13/2022    HGB 14.3 10/13/2022    HCT 43.8 10/13/2022    MCV 91 10/13/2022     10/13/2022           Assessment:   Problem List Items Addressed This Visit    None  Visit Diagnoses       Lumbar radiculitis    -  Primary    Lumbar radiculopathy        Chronic bilateral low back pain with right-sided sciatica        Lumbar spondylosis                  57 y.o. year old female with PMH bipolar, HTN, hypothyroidism who presents with back pain.  She is had chronic lower back pain for many years it has been gradually worsening.  Today her pain is 8/10, constant, aching, sharp with radiating pain down the back of the right leg stopping at the knee.  She denies any numbness or weakness.    1/9/2023: Delaney Noel returns for virtual follow-up.  She is s/p L5/S1 ANUJA on 11/23/2022 with initially 100% relief and now 30% relief.  She does report complete resolution of her previous right-sided radicular pain.  However she is reporting continued lower back pain today, 5/10, located across her belt line and tailbone, that is worsened with standing for extended periods of time and cooking and somewhat alleviated with rest.  She denies any radiating pain, new numbness, weakness or any changes to her bowel bladder function.    - She is s/p L5/S1 ANUJA on 11/23/2022 with initially 100% relief and now 30% relief.   - I independently reviewed her lumbar MRI is consistent with multilevel bilateral facet arthropathy worse at L4-5 and L5-S1 as well as diffuse central disc herniation at L5-S1 causing some mild central canal narrowing and bilateral foraminal narrowing  - I believe she responded appropriately to the ANUJA as her previous right-sided radicular pain has completely resolved.  - I believe her remaining pain is most likely combination of myofascial pain and axial facet mediated pain.  - for her continued pain discussed potentially trying diagnostic  medial branch blocks or continuing to maximize conservative therapy.  - at this time she would like to continue to maximize conservative therapy with continued formal physical therapy.  Orders placed.  She has attended [facility] in the past  - follow up as needed.  In the future if her pain persists despite PT can consider diagnostic medial branch blocks.    : Not applicable    This note was completed with dictation software and grammatical errors may exist.

## 2023-01-21 ENCOUNTER — PATIENT MESSAGE (OUTPATIENT)
Dept: ENDOSCOPY | Facility: HOSPITAL | Age: 58
End: 2023-01-21
Payer: COMMERCIAL

## 2023-01-23 NOTE — TELEPHONE ENCOUNTER
Per patient's my chart message, procedure cancelled as condition improved per message.  My chart message sent to patient regarding this.

## 2023-01-27 ENCOUNTER — IMMUNIZATION (OUTPATIENT)
Dept: PHARMACY | Facility: CLINIC | Age: 58
End: 2023-01-27
Payer: COMMERCIAL

## 2023-01-27 DIAGNOSIS — Z23 NEED FOR VACCINATION: Primary | ICD-10-CM

## 2023-01-30 ENCOUNTER — PATIENT MESSAGE (OUTPATIENT)
Dept: FAMILY MEDICINE | Facility: CLINIC | Age: 58
End: 2023-01-30
Payer: COMMERCIAL

## 2023-01-30 NOTE — TELEPHONE ENCOUNTER
No new care gaps identified.  [facility] Embedded Care Gaps. Reference number: [id number]. 1/30/2023   1:49:55 PM CST

## 2023-01-31 RX ORDER — CLONAZEPAM 1 MG/1
1 TABLET ORAL DAILY PRN
Qty: 30 TABLET | Refills: 5 | Status: SHIPPED | OUTPATIENT
Start: 2023-01-31 | End: 2023-09-14 | Stop reason: SDUPTHER

## 2023-03-01 ENCOUNTER — PATIENT MESSAGE (OUTPATIENT)
Dept: FAMILY MEDICINE | Facility: CLINIC | Age: 58
End: 2023-03-01
Payer: COMMERCIAL

## 2023-03-13 ENCOUNTER — HOSPITAL ENCOUNTER (OUTPATIENT)
Dept: RADIOLOGY | Facility: HOSPITAL | Age: 58
Discharge: HOME | End: 2023-03-13
Attending: INTERNAL MEDICINE
Payer: COMMERCIAL

## 2023-03-13 DIAGNOSIS — Z12.31 ENCOUNTER FOR SCREENING MAMMOGRAM FOR MALIGNANT NEOPLASM OF BREAST: ICD-10-CM

## 2023-03-13 PROCEDURE — 77067 SCR MAMMO BI INCL CAD: CPT | Mod: TC,PO

## 2023-03-13 PROCEDURE — 77067 SCR MAMMO BI INCL CAD: CPT | Mod: 26,,, | Performed by: RADIOLOGY

## 2023-03-13 PROCEDURE — 77063 BREAST TOMOSYNTHESIS BI: CPT | Mod: 26,,, | Performed by: RADIOLOGY

## 2023-03-13 PROCEDURE — 77063 MAMMO DIGITAL SCREENING BILAT WITH TOMO: ICD-10-PCS | Mod: 26,,, | Performed by: RADIOLOGY

## 2023-03-13 PROCEDURE — 77067 MAMMO DIGITAL SCREENING BILAT WITH TOMO: ICD-10-PCS | Mod: 26,,, | Performed by: RADIOLOGY

## 2023-04-19 ENCOUNTER — OFFICE VISIT (OUTPATIENT)
Dept: FAMILY MEDICINE | Facility: CLINIC | Age: 58
End: 2023-04-19
Payer: COMMERCIAL

## 2023-04-19 VITALS
WEIGHT: 195.44 LBS | HEART RATE: 60 BPM | OXYGEN SATURATION: 96 % | DIASTOLIC BLOOD PRESSURE: 76 MMHG | HEIGHT: 63 IN | BODY MASS INDEX: 34.63 KG/M2 | RESPIRATION RATE: 16 BRPM | SYSTOLIC BLOOD PRESSURE: 116 MMHG | TEMPERATURE: 98 F

## 2023-04-19 DIAGNOSIS — F41.1 GAD (GENERALIZED ANXIETY DISORDER): ICD-10-CM

## 2023-04-19 DIAGNOSIS — R91.1 LUNG NODULE: ICD-10-CM

## 2023-04-19 DIAGNOSIS — E03.9 HYPOTHYROIDISM, UNSPECIFIED TYPE: ICD-10-CM

## 2023-04-19 DIAGNOSIS — G47.33 OSA (OBSTRUCTIVE SLEEP APNEA): ICD-10-CM

## 2023-04-19 DIAGNOSIS — G43.009 MIGRAINE WITHOUT AURA AND WITHOUT STATUS MIGRAINOSUS, NOT INTRACTABLE: ICD-10-CM

## 2023-04-19 DIAGNOSIS — F43.10 PTSD (POST-TRAUMATIC STRESS DISORDER): ICD-10-CM

## 2023-04-19 DIAGNOSIS — I10 ESSENTIAL HYPERTENSION: ICD-10-CM

## 2023-04-19 DIAGNOSIS — M47.27 OSTEOARTHRITIS OF SPINE WITH RADICULOPATHY, LUMBOSACRAL REGION: ICD-10-CM

## 2023-04-19 DIAGNOSIS — E66.09 CLASS 1 OBESITY DUE TO EXCESS CALORIES WITH SERIOUS COMORBIDITY AND BODY MASS INDEX (BMI) OF 32.0 TO 32.9 IN ADULT: ICD-10-CM

## 2023-04-19 DIAGNOSIS — Z00.00 LABORATORY EXAMINATION ORDERED AS PART OF A COMPLETE PHYSICAL EXAMINATION: ICD-10-CM

## 2023-04-19 DIAGNOSIS — K58.0 IRRITABLE BOWEL SYNDROME WITH DIARRHEA: ICD-10-CM

## 2023-04-19 DIAGNOSIS — F41.0 PANIC ATTACK: ICD-10-CM

## 2023-04-19 DIAGNOSIS — J30.9 CHRONIC ALLERGIC RHINITIS: ICD-10-CM

## 2023-04-19 DIAGNOSIS — I25.10 CORONARY ARTERY DISEASE INVOLVING NATIVE CORONARY ARTERY OF NATIVE HEART WITHOUT ANGINA PECTORIS: Primary | ICD-10-CM

## 2023-04-19 DIAGNOSIS — E78.5 HYPERLIPIDEMIA, UNSPECIFIED HYPERLIPIDEMIA TYPE: ICD-10-CM

## 2023-04-19 DIAGNOSIS — F51.04 PSYCHOPHYSIOLOGICAL INSOMNIA: ICD-10-CM

## 2023-04-19 DIAGNOSIS — F33.1 MAJOR DEPRESSIVE DISORDER, RECURRENT EPISODE, MODERATE: ICD-10-CM

## 2023-04-19 DIAGNOSIS — Z23 NEED FOR PROPHYLACTIC VACCINATION AGAINST STREPTOCOCCUS PNEUMONIAE (PNEUMOCOCCUS): ICD-10-CM

## 2023-04-19 DIAGNOSIS — F31.81 BIPOLAR 2 DISORDER: ICD-10-CM

## 2023-04-19 PROCEDURE — 90677 PNEUMOCOCCAL CONJUGATE VACCINE 20-VALENT: ICD-10-PCS | Mod: S$GLB,,, | Performed by: INTERNAL MEDICINE

## 2023-04-19 PROCEDURE — 90677 PCV20 VACCINE IM: CPT | Mod: S$GLB,,, | Performed by: INTERNAL MEDICINE

## 2023-04-19 PROCEDURE — 3074F PR MOST RECENT SYSTOLIC BLOOD PRESSURE < 130 MM HG: ICD-10-PCS | Mod: CPTII,S$GLB,, | Performed by: INTERNAL MEDICINE

## 2023-04-19 PROCEDURE — 99214 PR OFFICE/OUTPT VISIT, EST, LEVL IV, 30-39 MIN: ICD-10-PCS | Mod: 25,S$GLB,, | Performed by: INTERNAL MEDICINE

## 2023-04-19 PROCEDURE — 1159F PR MEDICATION LIST DOCUMENTED IN MEDICAL RECORD: ICD-10-PCS | Mod: CPTII,S$GLB,, | Performed by: INTERNAL MEDICINE

## 2023-04-19 PROCEDURE — 3078F PR MOST RECENT DIASTOLIC BLOOD PRESSURE < 80 MM HG: ICD-10-PCS | Mod: CPTII,S$GLB,, | Performed by: INTERNAL MEDICINE

## 2023-04-19 PROCEDURE — 90471 PNEUMOCOCCAL CONJUGATE VACCINE 20-VALENT: ICD-10-PCS | Mod: S$GLB,,, | Performed by: INTERNAL MEDICINE

## 2023-04-19 PROCEDURE — 3078F DIAST BP <80 MM HG: CPT | Mod: CPTII,S$GLB,, | Performed by: INTERNAL MEDICINE

## 2023-04-19 PROCEDURE — 1160F RVW MEDS BY RX/DR IN RCRD: CPT | Mod: CPTII,S$GLB,, | Performed by: INTERNAL MEDICINE

## 2023-04-19 PROCEDURE — 3074F SYST BP LT 130 MM HG: CPT | Mod: CPTII,S$GLB,, | Performed by: INTERNAL MEDICINE

## 2023-04-19 PROCEDURE — 3008F BODY MASS INDEX DOCD: CPT | Mod: CPTII,S$GLB,, | Performed by: INTERNAL MEDICINE

## 2023-04-19 PROCEDURE — 1160F PR REVIEW ALL MEDS BY PRESCRIBER/CLIN PHARMACIST DOCUMENTED: ICD-10-PCS | Mod: CPTII,S$GLB,, | Performed by: INTERNAL MEDICINE

## 2023-04-19 PROCEDURE — 3008F PR BODY MASS INDEX (BMI) DOCUMENTED: ICD-10-PCS | Mod: CPTII,S$GLB,, | Performed by: INTERNAL MEDICINE

## 2023-04-19 PROCEDURE — 1159F MED LIST DOCD IN RCRD: CPT | Mod: CPTII,S$GLB,, | Performed by: INTERNAL MEDICINE

## 2023-04-19 PROCEDURE — 90471 IMMUNIZATION ADMIN: CPT | Mod: S$GLB,,, | Performed by: INTERNAL MEDICINE

## 2023-04-19 PROCEDURE — 99214 OFFICE O/P EST MOD 30 MIN: CPT | Mod: 25,S$GLB,, | Performed by: INTERNAL MEDICINE

## 2023-04-19 NOTE — PROGRESS NOTES
Subjective:       Patient ID: Delaney Noel is a 58 y.o. female.    Medication List with Changes/Refills   Current Medications    ALBUTEROL (PROVENTIL/VENTOLIN HFA) 90 MCG/ACTUATION INHALER    INHALE 2 PUFFS INTO THE LUNGS EVERY 6 (SIX) HOURS AS NEEDED FOR WHEEZING. RESCUE    ASPIRIN (ECOTRIN) 81 MG EC TABLET    Take 1 tablet (81 mg total) by mouth once daily.    ATENOLOL (TENORMIN) 50 MG TABLET    2 (two) times a day.    CETIRIZINE (ZYRTEC) 10 MG TABLET    Take 10 mg by mouth once daily. Take one(1) tab by mouth daily    CLONAZEPAM (KLONOPIN) 1 MG TABLET    Take 1 tablet (1 mg total) by mouth daily as needed for Anxiety.    DICYCLOMINE (BENTYL) 10 MG CAPSULE    TAKE 1 CAPSULE BY MOUTH BEFORE MEALS AND AT BEDTIME AS NEEDED (ABDOMINAL PAIN DIARRHEA).    EMGALITY  MG/ML PNIJ        ESCITALOPRAM OXALATE (LEXAPRO) 20 MG TABLET    TAKE 1.5 TABLETS BY MOUTH ONCE DAILY.    FLUTICASONE PROPIONATE (FLONASE) 50 MCG/ACTUATION NASAL SPRAY    2 sprays (100 mcg total) by Each Nostril route once daily.    GABAPENTIN (NEURONTIN) 300 MG CAPSULE    gabapentin 300 mg capsule   TAKE 1 CAPSULE BY MOUTH EVERYDAY AT BEDTIME    IBUPROFEN (ADVIL,MOTRIN) 800 MG TABLET    Take 800 mg by mouth 3 (three) times daily as needed.     KETOROLAC (TORADOL) 10 MG TABLET        LAMOTRIGINE (LAMICTAL) 25 MG TABLET    75 mg once daily. Take three tabs(75mg) by mouth daily    LEVOTHYROXINE (SYNTHROID) 50 MCG TABLET    TAKE 1 TABLET BY MOUTH DAILY FOR 3 DAYS THEN ON 4 TH DAY TAKE 1 AND 1/2 TABLETS BY MOUTH DAILY    LEVOTHYROXINE (SYNTHROID) 75 MCG TABLET    TAKE 1 TABLET ONCE A DAY ON MONDAYS & WEDNESDAYS AND FRIDAYS    MELOXICAM (MOBIC) 15 MG TABLET    TAKE 1 TABLET BY MOUTH EVERY DAY    METHOCARBAMOL (ROBAXIN) 750 MG TAB    Take 1 tablet (750 mg total) by mouth nightly as needed.    MIRTAZAPINE (REMERON) 30 MG TABLET    mirtazapine 30 mg tablet   1 tablet at bedtime; 30 MG; Once a day    QUETIAPINE (SEROQUEL) 50 MG TABLET    Take 1 tablet (50 mg  total) by mouth once daily.    ROSUVASTATIN (CRESTOR) 10 MG TABLET    Take 10 mg by mouth once daily.        Chief Complaint: Follow-up  She is here today to f/u on chronic medical issues      She has hypertension and is taking atenolol 50 mg qday.  She had a positive stress test and then subsequent cath (do not have records) and she reports she had non-obstructive CAD. She denies chest pain or shortness of breath. No lower leg swelling.        She has hyperlipidemia and is taking crestor 10 mg qday. Her lipids on 10/2022 were 130/160/38/60.  She is on aspirin daily.        She has hypothyroid and is taking levothyroxine 50 mcg qday on Tues-Thurs-Sat and Sun and 75 mcg qday on Mon-Wed-Fri.  Her TSH was normal on 10/2022.  She did have a thyroid u/s on 11/2018 showing thyroiditis and unchanged from previous.      She has a small 4 mm lung nodule seen on CT on 7/2014. Repeat CT on on 10/2022 was unchanged. No further imaging needed.      She has fatty liver seen on CT on 12/2019.      She has IBS with diarrhea. She has normal stools studies on 11/2021.  She was started on bentyl and has done well. She takes once to three times a day. She was seen by GI on 10/2022 who recommended a colonoscopy but she did not want this done. Overall her symptoms are improved on bentyl.      She has chronic migraines and is followed by neurology.  She is taking emgality monthly and toradol as needed. Symptoms associated with migraines are sensitive to light with nausea, dizziness and confusion.  She reports having increased headaches over the last couple months. She is having 4 to 5 migraines a month.      She has LOUISE and is using cpap. Diagnosed with sleep study on 12/2022.      She has multiple psychiatric issues. She has major depression with suicidal ideations (recent hospitalization for suicidal thoughts on 12/3/2019), anxiety with panic attack, bipolar type 2, PTSD with insomnia and OCD. She continues on klonopin 1 mg daily,  lamictal 75 mg daily and lexapro 20 mg daily and seroquel 50 mg nightly.  She takes remeron 30 mg nightly. She is followed by psychiatry. She feels this is helping her symptoms and denies any suicidal ideations at this time.      She has chronic low back pain since 10/2017. Around the time it was diagnosed she was having pain down her right leg and trouble walking.  She had an MRI of lumbar and cervical spine in 1/2019 showing mild spondylotic changes in L4-L5, L5-S1 and mild disease in cervical vertebra. She sent to PT which she feels helped with her symptoms. She is taking mobic 15 mg qam and robaxin 750 mg qhs PRN muscle spasms and gabapentin 300 mg when she has radicular pain.  She reports pain worse with prolonged sitting or driving, and better with moving, home exercises, mobic and gabapentin.  Rated 1/10 to 10/10.  No weakness but her pain does radiate down her right leg.  She continues to have worsening low back pain and is able to do less due to her pain.  She did have an ANUJA on 11/2021 which lessened her pain but she did not resolve her pain.      She lives with her  and feels safe at home. She does not exercise.  She is no longer working. She enjoys traveling in her RV.      Colonoscopy---7/2020  Mammogram----3/2023 neg   Pap-----12/2017 neg HPV neg---JAMIE  Tdap---9/2019  Influenza vaccine-----10/2021   Prevnar 20----none   Shingles vaccine-----10/2022  covid vaccine----4 doses     Review of Systems   Constitutional:  Negative for appetite change, fatigue, fever and unexpected weight change.   HENT:  Negative for congestion, ear pain, hearing loss, sore throat and trouble swallowing.    Eyes:  Negative for pain and visual disturbance.   Respiratory:  Negative for cough, chest tightness, shortness of breath and wheezing.    Cardiovascular:  Negative for chest pain, palpitations and leg swelling.   Gastrointestinal:  Positive for diarrhea. Negative for abdominal pain, blood in stool, constipation,  "nausea and vomiting.   Endocrine: Negative for polyuria.   Genitourinary:  Negative for dysuria and hematuria.   Musculoskeletal:  Positive for back pain. Negative for arthralgias and myalgias.   Skin:  Negative for rash.   Neurological:  Positive for headaches. Negative for dizziness, weakness and numbness.   Hematological:  Does not bruise/bleed easily.   Psychiatric/Behavioral:  Positive for dysphoric mood and sleep disturbance. Negative for suicidal ideas. The patient is nervous/anxious.      Objective:      Vitals:    04/19/23 0918   BP: 116/76   BP Location: Right arm   Patient Position: Sitting   Pulse: 60   Resp: 16   Temp: 98.2 °F (36.8 °C)   SpO2: 96%   Weight: 88.6 kg (195 lb 7 oz)   Height: 5' 3" (1.6 m)     Body mass index is 34.62 kg/m².  Physical Exam    General appearance: No acute distress, cooperative  Eyes: PERRL, EOMI, conjunctiva clear  Ears: normal external ear and pinna, tm clear without drainage, canals clear  Nose: Normal mucosa without drainage  Throat: no exudates or erythema, tonsils not enlarged  Mouth: no sores or lesions, moist mucous membranes  Neck: FROM, soft, supple, no thyromegaly, no bruits  Lymph: no anterior or posterior cervical adenopathy  Heart::  Regular rate and rhythm, no murmur  Lung: Clear to ascultation bilaterally, no wheezing, no rales, no rhonchi, no distress  Abdomen: Soft, nontender, no distention, no hepatosplenomegaly, bowel sounds normal, no guarding, no rebound, no peritoneal signs  Skin: no rashes, no lesions  Extremities: no edema, no cyanosis  Neuro: CN 2-12 intact, 5/5 muscle strength upper and lower extremity bilaterally, 2+ DTRs UE and LE bilaterally, normal gait  Peripheral pulses: 2+ pedal pulses bilaterally, good perfusion and color  Musculoskeletal: FROM, good strenth, no tenderness  Joint: normal appearance, no swelling, no warmth, no deformity in all joints    Assessment:       1. Coronary artery disease involving native coronary artery of native " heart without angina pectoris    2. Essential hypertension    3. Hyperlipidemia, unspecified hyperlipidemia type    4. Lung nodule    5. Chronic allergic rhinitis    6. Hypothyroidism, unspecified type    7. Irritable bowel syndrome with diarrhea    8. Migraine without aura and without status migrainosus, not intractable    9. Major depressive disorder, recurrent episode, moderate    10. Bipolar 2 disorder    11. PTSD (post-traumatic stress disorder)    12. HUNTER (generalized anxiety disorder)    13. Panic attack    14. Psychophysiological insomnia    15. LOUISE (obstructive sleep apnea)    16. Osteoarthritis of spine with radiculopathy, lumbosacral region    17. Class 1 obesity due to excess calories with serious comorbidity and body mass index (BMI) of 32.0 to 32.9 in adult    18. Laboratory examination ordered as part of a complete physical examination    19. Need for prophylactic vaccination against Streptococcus pneumoniae (pneumococcus)        Plan:       Coronary artery disease involving native coronary artery of native heart without angina pectoris  Stable and continue to monitor    Essential hypertension  Well controlled and continue current regimen.     Hyperlipidemia, unspecified hyperlipidemia type  Good control on crestor and aspirin daily    Lung nodule  No further imaging needed    Chronic allergic rhinitis  Uncontrolled due to worsening allergy symptoms. Continue flonase and antihistamines    Hypothyroidism, unspecified type  Good control on this dose of levothyroxine    Irritable bowel syndrome with diarrhea  Improved with use of bentyl    Migraine without aura and without status migrainosus, not intractable  Uncontrolled and advised to f/u with neurology to adjust her medications.    Major depressive disorder, recurrent episode, moderate  Well controlled and continue current regimen.     Bipolar 2 disorder  Stable on lamictal.     PTSD (post-traumatic stress disorder)  Stable and doing well    HUNTER  (generalized anxiety disorder) with panic attacks  Well controlled and continue current regimen.     Psychophysiological insomnia  Controlled on remeron and seroquel.     LOUISE (obstructive sleep apnea)  Stable and patient is compliant with use. Patient benefits from regular use of CPAP.     Osteoarthritis of spine with radiculopathy, lumbosacral region  Uncontrolled with worsening low back pain. She has some response to ANUJA but still with pain. Advised to take gabapentin daily.     Class 1 obesity due to excess calories with serious comorbidity and body mass index (BMI) of 32.0 to 32.9 in adult  Long discussion on the benefits of healthy eating and regular exercise to help lose weight and help control hypertension and hyperlipidemia and cad.     Laboratory examination ordered as part of a complete physical examination  -     CBC Auto Differential; Future; Expected date: 04/19/2023  -     Comprehensive Metabolic Panel; Future; Expected date: 04/19/2023  -     Lipid Panel; Future; Expected date: 04/19/2023  -     TSH; Future; Expected date: 04/19/2023  -     Hemoglobin A1C; Future; Expected date: 04/19/2023    Need for prophylactic vaccination against Streptococcus pneumoniae (pneumococcus)  -     (In Office Administered) Pneumococcal Conjugate Vaccine (20 Valent) (IM)    Follow up in about 6 months (around 10/19/2023) for chronic medical issues.

## 2023-05-06 DIAGNOSIS — R10.30 LOWER ABDOMINAL PAIN: ICD-10-CM

## 2023-05-06 DIAGNOSIS — K52.9 CHRONIC DIARRHEA: ICD-10-CM

## 2023-05-08 RX ORDER — DICYCLOMINE HYDROCHLORIDE 10 MG/1
CAPSULE ORAL
Qty: 120 CAPSULE | Refills: 0 | Status: SHIPPED | OUTPATIENT
Start: 2023-05-08 | End: 2023-06-01

## 2023-06-01 DIAGNOSIS — R10.30 LOWER ABDOMINAL PAIN: ICD-10-CM

## 2023-06-01 DIAGNOSIS — K52.9 CHRONIC DIARRHEA: ICD-10-CM

## 2023-06-01 RX ORDER — DICYCLOMINE HYDROCHLORIDE 10 MG/1
CAPSULE ORAL
Qty: 120 CAPSULE | Refills: 0 | Status: SHIPPED | OUTPATIENT
Start: 2023-06-01 | End: 2023-06-30

## 2023-06-03 ENCOUNTER — PATIENT MESSAGE (OUTPATIENT)
Dept: FAMILY MEDICINE | Facility: CLINIC | Age: 58
End: 2023-06-03
Payer: COMMERCIAL

## 2023-06-03 DIAGNOSIS — R06.02 SHORTNESS OF BREATH: ICD-10-CM

## 2023-06-03 NOTE — TELEPHONE ENCOUNTER
No care due was identified.  [facility] Embedded Care Due Messages. Reference number: [id number].   6/03/2023 10:00:25 AM CDT

## 2023-06-05 RX ORDER — ALBUTEROL SULFATE 90 UG/1
2 AEROSOL, METERED RESPIRATORY (INHALATION) EVERY 6 HOURS PRN
Qty: 18 G | Refills: 2 | Status: SHIPPED | OUTPATIENT
Start: 2023-06-05

## 2023-06-05 RX ORDER — LAMOTRIGINE 25 MG/1
75 TABLET ORAL DAILY
Qty: 270 TABLET | Refills: 3 | Status: SHIPPED | OUTPATIENT
Start: 2023-06-05 | End: 2023-11-28 | Stop reason: SDUPTHER

## 2023-06-05 RX ORDER — ROSUVASTATIN CALCIUM 10 MG/1
10 TABLET, COATED ORAL DAILY
Qty: 90 TABLET | Refills: 3 | Status: SHIPPED | OUTPATIENT
Start: 2023-06-05

## 2023-06-05 RX ORDER — GABAPENTIN 300 MG/1
CAPSULE ORAL
Qty: 90 CAPSULE | Refills: 3 | OUTPATIENT
Start: 2023-06-05

## 2023-06-05 RX ORDER — ATENOLOL 50 MG/1
50 TABLET ORAL DAILY
Qty: 90 TABLET | Refills: 3 | Status: SHIPPED | OUTPATIENT
Start: 2023-06-05

## 2023-06-05 NOTE — TELEPHONE ENCOUNTER
No care due was identified.  [facility] Embedded Care Due Messages. Reference number: [id number].   6/05/2023 11:35:39 AM CDT

## 2023-06-13 RX ORDER — GABAPENTIN 300 MG/1
CAPSULE ORAL
Qty: 90 CAPSULE | Refills: 3 | OUTPATIENT
Start: 2023-06-13

## 2023-06-13 NOTE — TELEPHONE ENCOUNTER
Called pharmacy to confirm receipt of prescription on 6/5/2023.  Renewal was not received.  Verbal order given to Miah.    
No care due was identified.  [facility] Embedded Care Due Messages. Reference number: [id number].   6/13/2023 9:21:41 AM CDT  
2

## 2023-06-30 DIAGNOSIS — R10.30 LOWER ABDOMINAL PAIN: ICD-10-CM

## 2023-06-30 DIAGNOSIS — K52.9 CHRONIC DIARRHEA: ICD-10-CM

## 2023-06-30 RX ORDER — DICYCLOMINE HYDROCHLORIDE 10 MG/1
CAPSULE ORAL
Qty: 120 CAPSULE | Refills: 0 | Status: SHIPPED | OUTPATIENT
Start: 2023-06-30 | End: 2023-07-14

## 2023-07-14 ENCOUNTER — TELEPHONE (OUTPATIENT)
Dept: PAIN MEDICINE | Facility: CLINIC | Age: 58
End: 2023-07-14

## 2023-07-14 ENCOUNTER — OFFICE VISIT (OUTPATIENT)
Dept: PAIN MEDICINE | Facility: CLINIC | Age: 58
End: 2023-07-14
Payer: COMMERCIAL

## 2023-07-14 VITALS
WEIGHT: 195.31 LBS | SYSTOLIC BLOOD PRESSURE: 137 MMHG | HEART RATE: 75 BPM | DIASTOLIC BLOOD PRESSURE: 98 MMHG | HEIGHT: 63 IN | BODY MASS INDEX: 34.61 KG/M2

## 2023-07-14 DIAGNOSIS — M47.816 LUMBAR SPONDYLOSIS: ICD-10-CM

## 2023-07-14 DIAGNOSIS — G89.29 CHRONIC BILATERAL LOW BACK PAIN WITH RIGHT-SIDED SCIATICA: ICD-10-CM

## 2023-07-14 DIAGNOSIS — M54.16 LUMBAR RADICULOPATHY: Primary | ICD-10-CM

## 2023-07-14 DIAGNOSIS — M54.41 CHRONIC BILATERAL LOW BACK PAIN WITH RIGHT-SIDED SCIATICA: ICD-10-CM

## 2023-07-14 DIAGNOSIS — K52.9 CHRONIC DIARRHEA: ICD-10-CM

## 2023-07-14 DIAGNOSIS — R10.30 LOWER ABDOMINAL PAIN: ICD-10-CM

## 2023-07-14 PROCEDURE — 3075F SYST BP GE 130 - 139MM HG: CPT | Mod: CPTII,S$GLB,,

## 2023-07-14 PROCEDURE — 3080F PR MOST RECENT DIASTOLIC BLOOD PRESSURE >= 90 MM HG: ICD-10-PCS | Mod: CPTII,S$GLB,,

## 2023-07-14 PROCEDURE — 3008F BODY MASS INDEX DOCD: CPT | Mod: CPTII,S$GLB,,

## 2023-07-14 PROCEDURE — 99999 PR PBB SHADOW E&M-EST. PATIENT-LVL IV: ICD-10-PCS | Mod: PBBFAC,,,

## 2023-07-14 PROCEDURE — 1159F MED LIST DOCD IN RCRD: CPT | Mod: CPTII,S$GLB,,

## 2023-07-14 PROCEDURE — 99214 PR OFFICE/OUTPT VISIT, EST, LEVL IV, 30-39 MIN: ICD-10-PCS | Mod: S$GLB,,,

## 2023-07-14 PROCEDURE — 3075F PR MOST RECENT SYSTOLIC BLOOD PRESS GE 130-139MM HG: ICD-10-PCS | Mod: CPTII,S$GLB,,

## 2023-07-14 PROCEDURE — 99214 OFFICE O/P EST MOD 30 MIN: CPT | Mod: S$GLB,,,

## 2023-07-14 PROCEDURE — 1159F PR MEDICATION LIST DOCUMENTED IN MEDICAL RECORD: ICD-10-PCS | Mod: CPTII,S$GLB,,

## 2023-07-14 PROCEDURE — 99999 PR PBB SHADOW E&M-EST. PATIENT-LVL IV: CPT | Mod: PBBFAC,,,

## 2023-07-14 PROCEDURE — 3008F PR BODY MASS INDEX (BMI) DOCUMENTED: ICD-10-PCS | Mod: CPTII,S$GLB,,

## 2023-07-14 PROCEDURE — 3080F DIAST BP >= 90 MM HG: CPT | Mod: CPTII,S$GLB,,

## 2023-07-14 RX ORDER — DICYCLOMINE HYDROCHLORIDE 10 MG/1
CAPSULE ORAL
Qty: 360 CAPSULE | Refills: 1 | Status: SHIPPED | OUTPATIENT
Start: 2023-07-14

## 2023-07-14 RX ORDER — ALPRAZOLAM 0.5 MG/1
0.5 TABLET, ORALLY DISINTEGRATING ORAL ONCE AS NEEDED
Status: CANCELLED | OUTPATIENT
Start: 2023-07-14 | End: 2034-12-10

## 2023-07-14 NOTE — PROGRESS NOTES
[facility] Follow Up Evaluation      Referred by: No ref. provider found    PCP: Dr. Swartz    CC:   Chief Complaint   Patient presents with    Back Pain    Low-back Pain    Mid-back Pain      No flowsheet data found.    Interval HPI 7/14/2023: Delaney Noel returns to the office for follow up.  Today she is reporting return of her lower back pain, 7/10, across her lower back, with radiation down the back of her right leg into her foot.  She denies any associated numbness or maricarmen weakness.  She does feel like her left leg can give out on her sometimes due to the severity of the pain.  She denies any new changes to her bowel bladder function.  She is been taking methocarbamol, Mobic and gabapentin without significant relief of her pain.      HPI:   Delaney Noel is a 58 y.o. female who presents with back pain.  She is had chronic lower back pain for many years it has been gradually worsening.  Today her pain is 8/10, constant, aching, sharp with radiating pain down the back of the right leg stopping at the knee.  She denies any numbness or weakness.      Pain Intervention History:  - s/p L5/S1 ANUJA on 11/23/2022 with initially 100% relief and now 30% relief.    Past Spine Surgical History:      Past and current medications:  Antineuropathics:  NSAIDs: mobic, ibuprofen, toradol  Physical therapy: yes, currently   Antidepressants: gabapentin  Muscle relaxers: robaxin  Opioids:  Antiplatelets/Anticoagulants: aspirin    History:    Current Outpatient Medications:     albuterol (PROVENTIL/VENTOLIN HFA) 90 mcg/actuation inhaler, Inhale 2 puffs into the lungs every 6 (six) hours as needed for Wheezing. Rescue, Disp: 18 g, Rfl: 2    atenoloL (TENORMIN) 50 MG tablet, Take 1 tablet (50 mg total) by mouth once daily., Disp: 90 tablet, Rfl: 3    cetirizine (ZYRTEC) 10 MG tablet, Take 10 mg by mouth once daily. Take one(1) tab by mouth daily, Disp: , Rfl:     clonazePAM (KLONOPIN) 1 MG tablet, Take 1 tablet (1  mg total) by mouth daily as needed for Anxiety., Disp: 30 tablet, Rfl: 5    dicyclomine (BENTYL) 10 MG capsule, TAKE 1 CAPSULE BY MOUTH BEFORE MEALS AND AT BEDTIME AS NEEDED (ABDOMINAL PAIN DIARRHEA)., Disp: 120 capsule, Rfl: 0    EMGALITY  mg/mL PnIj, , Disp: , Rfl:     EScitalopram oxalate (LEXAPRO) 20 MG tablet, TAKE 1.5 TABLETS BY MOUTH ONCE DAILY., Disp: 135 tablet, Rfl: 3    fluticasone propionate (FLONASE) 50 mcg/actuation nasal spray, 2 sprays (100 mcg total) by Each Nostril route once daily., Disp: 16 g, Rfl: 6    gabapentin (NEURONTIN) 300 MG capsule, gabapentin 300 mg capsule  TAKE 1 CAPSULE BY MOUTH EVERYDAY AT BEDTIME, Disp: 90 capsule, Rfl: 3    ibuprofen (ADVIL,MOTRIN) 800 MG tablet, Take 800 mg by mouth 3 (three) times daily as needed. , Disp: , Rfl:     ketorolac (TORADOL) 10 mg tablet, , Disp: , Rfl:     lamoTRIgine (LAMICTAL) 25 MG tablet, Take 3 tablets (75 mg total) by mouth once daily. Take three tabs(75mg) by mouth daily, Disp: 270 tablet, Rfl: 3    levothyroxine (SYNTHROID) 50 MCG tablet, TAKE 1 TABLET BY MOUTH DAILY FOR 3 DAYS THEN ON 4 TH DAY TAKE 1 AND 1/2 TABLETS BY MOUTH DAILY, Disp: 135 tablet, Rfl: 1    levothyroxine (SYNTHROID) 75 MCG tablet, TAKE 1 TABLET ONCE A DAY ON MONDAYS & WEDNESDAYS AND FRIDAYS, Disp: 36 tablet, Rfl: 9    meloxicam (MOBIC) 15 MG tablet, TAKE 1 TABLET BY MOUTH EVERY DAY, Disp: 90 tablet, Rfl: 1    methocarbamoL (ROBAXIN) 750 MG Tab, Take 1 tablet (750 mg total) by mouth nightly as needed., Disp: 90 tablet, Rfl: 2    mirtazapine (REMERON) 30 MG tablet, mirtazapine 30 mg tablet  1 tablet at bedtime; 30 MG; Once a day, Disp: , Rfl:     QUEtiapine (SEROQUEL) 50 MG tablet, Take 1 tablet (50 mg total) by mouth once daily., Disp: 90 tablet, Rfl: 1    rosuvastatin (CRESTOR) 10 MG tablet, Take 1 tablet (10 mg total) by mouth once daily., Disp: 90 tablet, Rfl: 3    aspirin (ECOTRIN) 81 MG EC tablet, Take 1 tablet (81 mg total) by mouth once daily., Disp: , Rfl:  0    Past Medical History:   Diagnosis Date    Allergy     Depression     Hx of colonic polyps 2014    per colonoscopy report    Hypertension     Migraine     Suicidal thoughts 2019    [facility] tx     Thyroid disease     Hypothyroid, MNG       Past Surgical History:   Procedure Laterality Date    COLONOSCOPY      COLONOSCOPY N/A 2020    Procedure: COLONOSCOPY;  Surgeon: Thiago Lozoya Jr., MD;  Location: [facility] ENDO;  Service: Endoscopy;  Laterality: N/A;    cystoscope      EPIDURAL STEROID INJECTION INTO LUMBAR SPINE N/A 2022    Procedure: Injection-steroid-epidural-lumbar L5/S1;  Surgeon: Chandler Pereira MD;  Location: [facility] OR;  Service: Pain Management;  Laterality: N/A;    HYSTERECTOMY      JAMIE with BSO- age 42    OOPHORECTOMY         Family History   Problem Relation Age of Onset    Heart disease Mother         had childhood rheumatic fever    Cancer Mother         uterine cancer    Cervical cancer Mother 28    Atrial fibrillation Father     Cancer Maternal Grandmother         uterine cancer    Stroke Maternal Grandmother     Heart disease Maternal Grandmother 50        MI    Stroke Maternal Grandfather     Nephrolithiasis Neg Hx        Social History     Socioeconomic History    Marital status:     Number of children: 2   Tobacco Use    Smoking status: Former     Packs/day: 0.25     Years: 25.00     Pack years: 6.25     Types: Cigarettes     Quit date: 2015     Years since quittin.1    Smokeless tobacco: Never    Tobacco comments:     had quit x 2   Substance and Sexual Activity    Alcohol use: Yes     Alcohol/week: 2.0 standard drinks     Types: 2 Cans of beer per week     Comment: once a week     Drug use: No    Sexual activity: Yes     Partners: Male     Birth control/protection: None, See Surgical Hx     Social Determinants of Health     Financial Resource Strain: Low Risk     Difficulty of Paying Living Expenses: Not hard at all   Food Insecurity: No Food  "Insecurity    Worried About Running Out of Food in the Last Year: Never true    Ran Out of Food in the Last Year: Never true   Transportation Needs: No Transportation Needs    Lack of Transportation (Medical): No    Lack of Transportation (Non-Medical): No   Physical Activity: Inactive    Days of Exercise per Week: 0 days    Minutes of Exercise per Session: 0 min   Stress: Stress Concern Present    Feeling of Stress : To some extent   Social Connections: Unknown    Frequency of Communication with Friends and Family: Once a week    Frequency of Social Gatherings with Friends and Family: Never    Active Member of Clubs or Organizations: No    Attends Club or Organization Meetings: Never    Marital Status:    Housing Stability: Low Risk     Unable to Pay for Housing in the Last Year: No    Number of Places Lived in the Last Year: 1    Unstable Housing in the Last Year: No       Review of patient's allergies indicates:   Allergen Reactions    Hay fever and allergy relief        Review of Systems:  Positive for joint pain. Balance  of review of systems is negative.    Physical Exam:  Vitals:    07/14/23 0743   BP: (!) 137/98   Pulse: 75   Weight: 88.6 kg (195 lb 5.2 oz)   Height: 5' 3" (1.6 m)   PainSc:   6   PainLoc: Back       Body mass index is 34.6 kg/m².    Gen: NAD  Psych: mood appropriate for given condition  HEENT: eyes anicteric   CV: RRR, 2+ radial pulse  HEENT: anicteric   Respiratory: non-labored, no signs of respiratory distress  Abd: non-distended  Skin: warm, dry and intact.  Gait:  No antalgic gait.      Sensory:   Intact and symmetrical to light touch in L1-S1 dermatomes bilaterally.     Motor:           Right Left   L2/3 Iliacus Hip flexion  5  5   L3/4 Qudratus Femoris Knee Extension  5  5   L4/5 Tib Anterior Ankle Dorsiflexion   5  5   L5/S1 Extensor Hallicus Longus Great toe extension  5  5   S1/S2 Gastroc/Soleus Plantar Flexion  5  5        Right Left   Triceps DTR     Biceps DTR   "   Brachioradialis DTR     Patellar DTR 2+ 2+   Achilles DTR 0+ 2+   Cuevas Absent  Absent                          Imaging:  MRI lumbar spine 11/8/22  FINDINGS:  Alignment: Mild levoconvex curvature of the lumbar spine.     Vertebral column: Vertebral body heights are maintained.  No evidence of an acute fracture or aggressive marrow replacement process. Multilevel disc degeneration, most pronounced at L5-S1 with marked disc space narrowing, degenerative endplate change and marginal osteophyte formation.  Stable probable hemangioma within the L3 vertebral body.     Cord: Normal.  Conus terminates at L1.     Degenerative findings:     T12-L1: No significant disc bulge.  Mild bilateral facet arthropathy.  No neural foraminal or spinal canal stenosis.  L1-L2: No significant disc abnormality.  Mild bilateral facet arthropathy.  There is no neural foraminal stenosis.  There is no spinal canal stenosis.  L2-L3: No significant disc abnormality.  Mild bilateral facet arthropathy.  There is no neural foraminal stenosis.  There is no spinal canal stenosis.  L3-L4: Minimal diffuse disc bulge.  Mild bilateral facet arthropathy and ligamentum flavum thickening.  Mild left neural foraminal stenosis.  There is no spinal canal stenosis.  L4-L5: Mild diffuse disc bulge with mild broad-based right extraforaminal disc protrusion through an annular fissure.  Moderate bilateral facet arthropathy.  Ligamentum flavum thickening.  Mild bilateral neural foraminal stenosis.  Mild spinal canal stenosis.  L5-S1: Marked disc space narrowing with osteophytic ridging and diffuse disc herniation.  Moderate bilateral facet arthropathy.  Ligamentum flavum thickening.  Moderate left and mild-to-moderate right neural foraminal stenosis.  Mild spinal canal stenosis.     Paraspinal muscles & soft tissues: No significant abnormality.    Labs:  Lab Results   Component Value Date    HGBA1C 5.4 10/13/2022       Lab Results   Component Value Date    WBC  6.30 10/13/2022    HGB 14.3 10/13/2022    HCT 43.8 10/13/2022    MCV 91 10/13/2022     10/13/2022           Assessment:   Problem List Items Addressed This Visit    None  Visit Diagnoses       Lumbar radiculopathy    -  Primary    Chronic bilateral low back pain with right-sided sciatica        Lumbar spondylosis                    58 y.o. year old female with PMH bipolar, HTN, hypothyroidism who presents with back pain.  She is had chronic lower back pain for many years it has been gradually worsening.  Today her pain is 8/10, constant, aching, sharp with radiating pain down the back of the right leg stopping at the knee.  She denies any numbness or weakness.    7/14/2023: Delaney oNel returns to the office for follow up.  Today she is reporting return of her lower back pain, 7/10, across her lower back, with radiation down the back of her right leg into her foot.  She denies any associated numbness or maricarmen weakness.  She does feel like her left leg can give out on her sometimes due to the severity of the pain.  She denies any new changes to her bowel bladder function.  She is been taking methocarbamol, Mobic and gabapentin without significant relief of her pain.    - on exam she is full strength in her lower extremities.  She has increased pain with bilateral axial facet loading.  - I independently reviewed her lumbar MRI is consistent with multilevel bilateral facet arthropathy worse at L4-5 and L5-S1 as well as diffuse central disc herniation at L5-S1 causing some mild central canal narrowing and bilateral foraminal narrowing.  Modic type 2 endplate changes at L5/S1.  - I believe her lumbar radicular pain has returned.  - this pain is limiting her mobility interfering with her ADLs.  - she is not finding significant relief with Mobic, methocarbamol and gabapentin.  She maintains her at-home PT directed exercises.  - her pain is too severe to restart formal physical therapy  - she is likely having a  combination of lumbar radicular pain and axial facet mediated pain.  - she responded very well to lumbar epidural in the past giving her near 100% relief of previous radicular pain lasting over 8 months.  - at this time to address her radicular pain I would like to schedule her for a repeat L5/S1 ANUJA.  - follow-up 2 weeks post procedure or sooner if needed.  If she gets good relief of her radicular pain but has some persistent lower back pain can consider diagnostic medial branch for the facet mediated pain.      : Not applicable    This note was completed with dictation software and grammatical errors may exist.

## 2023-07-14 NOTE — TELEPHONE ENCOUNTER
Patient is scheduled to have an epidural injection on 7/25/23 by dr. Pereira and needs to hold aspirin for 7 days. Please advise on clearance.

## 2023-07-14 NOTE — H&P (VIEW-ONLY)
[facility] Follow Up Evaluation      Referred by: No ref. provider found    PCP: Dr. Swartz    CC:   Chief Complaint   Patient presents with    Back Pain    Low-back Pain    Mid-back Pain      No flowsheet data found.    Interval HPI 7/14/2023: Delaney Noel returns to the office for follow up.  Today she is reporting return of her lower back pain, 7/10, across her lower back, with radiation down the back of her right leg into her foot.  She denies any associated numbness or maricarmen weakness.  She does feel like her left leg can give out on her sometimes due to the severity of the pain.  She denies any new changes to her bowel bladder function.  She is been taking methocarbamol, Mobic and gabapentin without significant relief of her pain.      HPI:   Delaney Noel is a 58 y.o. female who presents with back pain.  She is had chronic lower back pain for many years it has been gradually worsening.  Today her pain is 8/10, constant, aching, sharp with radiating pain down the back of the right leg stopping at the knee.  She denies any numbness or weakness.      Pain Intervention History:  - s/p L5/S1 ANUJA on 11/23/2022 with initially 100% relief and now 30% relief.    Past Spine Surgical History:      Past and current medications:  Antineuropathics:  NSAIDs: mobic, ibuprofen, toradol  Physical therapy: yes, currently   Antidepressants: gabapentin  Muscle relaxers: robaxin  Opioids:  Antiplatelets/Anticoagulants: aspirin    History:    Current Outpatient Medications:     albuterol (PROVENTIL/VENTOLIN HFA) 90 mcg/actuation inhaler, Inhale 2 puffs into the lungs every 6 (six) hours as needed for Wheezing. Rescue, Disp: 18 g, Rfl: 2    atenoloL (TENORMIN) 50 MG tablet, Take 1 tablet (50 mg total) by mouth once daily., Disp: 90 tablet, Rfl: 3    cetirizine (ZYRTEC) 10 MG tablet, Take 10 mg by mouth once daily. Take one(1) tab by mouth daily, Disp: , Rfl:     clonazePAM (KLONOPIN) 1 MG tablet, Take 1 tablet (1  mg total) by mouth daily as needed for Anxiety., Disp: 30 tablet, Rfl: 5    dicyclomine (BENTYL) 10 MG capsule, TAKE 1 CAPSULE BY MOUTH BEFORE MEALS AND AT BEDTIME AS NEEDED (ABDOMINAL PAIN DIARRHEA)., Disp: 120 capsule, Rfl: 0    EMGALITY  mg/mL PnIj, , Disp: , Rfl:     EScitalopram oxalate (LEXAPRO) 20 MG tablet, TAKE 1.5 TABLETS BY MOUTH ONCE DAILY., Disp: 135 tablet, Rfl: 3    fluticasone propionate (FLONASE) 50 mcg/actuation nasal spray, 2 sprays (100 mcg total) by Each Nostril route once daily., Disp: 16 g, Rfl: 6    gabapentin (NEURONTIN) 300 MG capsule, gabapentin 300 mg capsule  TAKE 1 CAPSULE BY MOUTH EVERYDAY AT BEDTIME, Disp: 90 capsule, Rfl: 3    ibuprofen (ADVIL,MOTRIN) 800 MG tablet, Take 800 mg by mouth 3 (three) times daily as needed. , Disp: , Rfl:     ketorolac (TORADOL) 10 mg tablet, , Disp: , Rfl:     lamoTRIgine (LAMICTAL) 25 MG tablet, Take 3 tablets (75 mg total) by mouth once daily. Take three tabs(75mg) by mouth daily, Disp: 270 tablet, Rfl: 3    levothyroxine (SYNTHROID) 50 MCG tablet, TAKE 1 TABLET BY MOUTH DAILY FOR 3 DAYS THEN ON 4 TH DAY TAKE 1 AND 1/2 TABLETS BY MOUTH DAILY, Disp: 135 tablet, Rfl: 1    levothyroxine (SYNTHROID) 75 MCG tablet, TAKE 1 TABLET ONCE A DAY ON MONDAYS & WEDNESDAYS AND FRIDAYS, Disp: 36 tablet, Rfl: 9    meloxicam (MOBIC) 15 MG tablet, TAKE 1 TABLET BY MOUTH EVERY DAY, Disp: 90 tablet, Rfl: 1    methocarbamoL (ROBAXIN) 750 MG Tab, Take 1 tablet (750 mg total) by mouth nightly as needed., Disp: 90 tablet, Rfl: 2    mirtazapine (REMERON) 30 MG tablet, mirtazapine 30 mg tablet  1 tablet at bedtime; 30 MG; Once a day, Disp: , Rfl:     QUEtiapine (SEROQUEL) 50 MG tablet, Take 1 tablet (50 mg total) by mouth once daily., Disp: 90 tablet, Rfl: 1    rosuvastatin (CRESTOR) 10 MG tablet, Take 1 tablet (10 mg total) by mouth once daily., Disp: 90 tablet, Rfl: 3    aspirin (ECOTRIN) 81 MG EC tablet, Take 1 tablet (81 mg total) by mouth once daily., Disp: , Rfl:  0    Past Medical History:   Diagnosis Date    Allergy     Depression     Hx of colonic polyps 2014    per colonoscopy report    Hypertension     Migraine     Suicidal thoughts 2019    [facility] tx     Thyroid disease     Hypothyroid, MNG       Past Surgical History:   Procedure Laterality Date    COLONOSCOPY      COLONOSCOPY N/A 2020    Procedure: COLONOSCOPY;  Surgeon: Thiago Lozoya Jr., MD;  Location: [facility];  Service: Endoscopy;  Laterality: N/A;    cystoscope      EPIDURAL STEROID INJECTION INTO LUMBAR SPINE N/A 2022    Procedure: Injection-steroid-epidural-lumbar L5/S1;  Surgeon: Chandler Pereira MD;  Location: [facility];  Service: Pain Management;  Laterality: N/A;    HYSTERECTOMY      JAMIE with BSO- age 42    OOPHORECTOMY         Family History   Problem Relation Age of Onset    Heart disease Mother         had childhood rheumatic fever    Cancer Mother         uterine cancer    Cervical cancer Mother 28    Atrial fibrillation Father     Cancer Maternal Grandmother         uterine cancer    Stroke Maternal Grandmother     Heart disease Maternal Grandmother 50        MI    Stroke Maternal Grandfather     Nephrolithiasis Neg Hx        Social History     Socioeconomic History    Marital status:     Number of children: 2   Tobacco Use    Smoking status: Former     Packs/day: 0.25     Years: 25.00     Pack years: 6.25     Types: Cigarettes     Quit date: 2015     Years since quittin.1    Smokeless tobacco: Never    Tobacco comments:     had quit x 2   Substance and Sexual Activity    Alcohol use: Yes     Alcohol/week: 2.0 standard drinks     Types: 2 Cans of beer per week     Comment: once a week     Drug use: No    Sexual activity: Yes     Partners: Male     Birth control/protection: None, See Surgical Hx     Social Determinants of Health     Financial Resource Strain: Low Risk     Difficulty of Paying Living Expenses: Not hard at all   Food Insecurity: No Food  "Insecurity    Worried About Running Out of Food in the Last Year: Never true    Ran Out of Food in the Last Year: Never true   Transportation Needs: No Transportation Needs    Lack of Transportation (Medical): No    Lack of Transportation (Non-Medical): No   Physical Activity: Inactive    Days of Exercise per Week: 0 days    Minutes of Exercise per Session: 0 min   Stress: Stress Concern Present    Feeling of Stress : To some extent   Social Connections: Unknown    Frequency of Communication with Friends and Family: Once a week    Frequency of Social Gatherings with Friends and Family: Never    Active Member of Clubs or Organizations: No    Attends Club or Organization Meetings: Never    Marital Status:    Housing Stability: Low Risk     Unable to Pay for Housing in the Last Year: No    Number of Places Lived in the Last Year: 1    Unstable Housing in the Last Year: No       Review of patient's allergies indicates:   Allergen Reactions    Hay fever and allergy relief        Review of Systems:  Positive for joint pain. Balance  of review of systems is negative.    Physical Exam:  Vitals:    07/14/23 0743   BP: (!) 137/98   Pulse: 75   Weight: 88.6 kg (195 lb 5.2 oz)   Height: 5' 3" (1.6 m)   PainSc:   6   PainLoc: Back       Body mass index is 34.6 kg/m².    Gen: NAD  Psych: mood appropriate for given condition  HEENT: eyes anicteric   CV: RRR, 2+ radial pulse  HEENT: anicteric   Respiratory: non-labored, no signs of respiratory distress  Abd: non-distended  Skin: warm, dry and intact.  Gait:  No antalgic gait.      Sensory:   Intact and symmetrical to light touch in L1-S1 dermatomes bilaterally.     Motor:           Right Left   L2/3 Iliacus Hip flexion  5  5   L3/4 Qudratus Femoris Knee Extension  5  5   L4/5 Tib Anterior Ankle Dorsiflexion   5  5   L5/S1 Extensor Hallicus Longus Great toe extension  5  5   S1/S2 Gastroc/Soleus Plantar Flexion  5  5        Right Left   Triceps DTR     Biceps DTR   "   Brachioradialis DTR     Patellar DTR 2+ 2+   Achilles DTR 0+ 2+   Cuevas Absent  Absent                          Imaging:  MRI lumbar spine 11/8/22  FINDINGS:  Alignment: Mild levoconvex curvature of the lumbar spine.     Vertebral column: Vertebral body heights are maintained.  No evidence of an acute fracture or aggressive marrow replacement process. Multilevel disc degeneration, most pronounced at L5-S1 with marked disc space narrowing, degenerative endplate change and marginal osteophyte formation.  Stable probable hemangioma within the L3 vertebral body.     Cord: Normal.  Conus terminates at L1.     Degenerative findings:     T12-L1: No significant disc bulge.  Mild bilateral facet arthropathy.  No neural foraminal or spinal canal stenosis.  L1-L2: No significant disc abnormality.  Mild bilateral facet arthropathy.  There is no neural foraminal stenosis.  There is no spinal canal stenosis.  L2-L3: No significant disc abnormality.  Mild bilateral facet arthropathy.  There is no neural foraminal stenosis.  There is no spinal canal stenosis.  L3-L4: Minimal diffuse disc bulge.  Mild bilateral facet arthropathy and ligamentum flavum thickening.  Mild left neural foraminal stenosis.  There is no spinal canal stenosis.  L4-L5: Mild diffuse disc bulge with mild broad-based right extraforaminal disc protrusion through an annular fissure.  Moderate bilateral facet arthropathy.  Ligamentum flavum thickening.  Mild bilateral neural foraminal stenosis.  Mild spinal canal stenosis.  L5-S1: Marked disc space narrowing with osteophytic ridging and diffuse disc herniation.  Moderate bilateral facet arthropathy.  Ligamentum flavum thickening.  Moderate left and mild-to-moderate right neural foraminal stenosis.  Mild spinal canal stenosis.     Paraspinal muscles & soft tissues: No significant abnormality.    Labs:  Lab Results   Component Value Date    HGBA1C 5.4 10/13/2022       Lab Results   Component Value Date    WBC  6.30 10/13/2022    HGB 14.3 10/13/2022    HCT 43.8 10/13/2022    MCV 91 10/13/2022     10/13/2022           Assessment:   Problem List Items Addressed This Visit    None  Visit Diagnoses       Lumbar radiculopathy    -  Primary    Chronic bilateral low back pain with right-sided sciatica        Lumbar spondylosis                    58 y.o. year old female with PMH bipolar, HTN, hypothyroidism who presents with back pain.  She is had chronic lower back pain for many years it has been gradually worsening.  Today her pain is 8/10, constant, aching, sharp with radiating pain down the back of the right leg stopping at the knee.  She denies any numbness or weakness.    7/14/2023: Delaney Noel returns to the office for follow up.  Today she is reporting return of her lower back pain, 7/10, across her lower back, with radiation down the back of her right leg into her foot.  She denies any associated numbness or maricarmen weakness.  She does feel like her left leg can give out on her sometimes due to the severity of the pain.  She denies any new changes to her bowel bladder function.  She is been taking methocarbamol, Mobic and gabapentin without significant relief of her pain.    - on exam she is full strength in her lower extremities.  She has increased pain with bilateral axial facet loading.  - I independently reviewed her lumbar MRI is consistent with multilevel bilateral facet arthropathy worse at L4-5 and L5-S1 as well as diffuse central disc herniation at L5-S1 causing some mild central canal narrowing and bilateral foraminal narrowing.  Modic type 2 endplate changes at L5/S1.  - I believe her lumbar radicular pain has returned.  - this pain is limiting her mobility interfering with her ADLs.  - she is not finding significant relief with Mobic, methocarbamol and gabapentin.  She maintains her at-home PT directed exercises.  - her pain is too severe to restart formal physical therapy  - she is likely having a  combination of lumbar radicular pain and axial facet mediated pain.  - she responded very well to lumbar epidural in the past giving her near 100% relief of previous radicular pain lasting over 8 months.  - at this time to address her radicular pain I would like to schedule her for a repeat L5/S1 ANUJA.  - follow-up 2 weeks post procedure or sooner if needed.  If she gets good relief of her radicular pain but has some persistent lower back pain can consider diagnostic medial branch for the facet mediated pain.      : Not applicable    This note was completed with dictation software and grammatical errors may exist.

## 2023-07-14 NOTE — TELEPHONE ENCOUNTER
Please schedule patient for the following procedure:    Physician - Dr Pereira    Type of Procedure/Injection - Lumbar Epidural  L5/S1           Laterality - NA      Type of Sedation - Local      Need to hold medication - Yes      Aspirin for 7 days and NSAIDs for 2 days      Clearance needed - Yes      Follow up - 2 week

## 2023-07-25 ENCOUNTER — HOSPITAL ENCOUNTER (OUTPATIENT)
Dept: RADIOLOGY | Facility: HOSPITAL | Age: 58
Discharge: HOME | End: 2023-07-25
Attending: ANESTHESIOLOGY | Admitting: ANESTHESIOLOGY
Payer: COMMERCIAL

## 2023-07-25 ENCOUNTER — HOSPITAL ENCOUNTER (OUTPATIENT)
Facility: HOSPITAL | Age: 58
Discharge: HOME | End: 2023-07-25
Attending: ANESTHESIOLOGY | Admitting: ANESTHESIOLOGY
Payer: COMMERCIAL

## 2023-07-25 VITALS
HEIGHT: 63 IN | HEART RATE: 60 BPM | SYSTOLIC BLOOD PRESSURE: 121 MMHG | WEIGHT: 195 LBS | TEMPERATURE: 97 F | DIASTOLIC BLOOD PRESSURE: 70 MMHG | BODY MASS INDEX: 34.55 KG/M2 | OXYGEN SATURATION: 95 % | RESPIRATION RATE: 16 BRPM

## 2023-07-25 DIAGNOSIS — M54.16 LUMBAR RADICULOPATHY: Primary | ICD-10-CM

## 2023-07-25 DIAGNOSIS — M54.50 LOWER BACK PAIN: ICD-10-CM

## 2023-07-25 PROCEDURE — 25000003 PHARM REV CODE 250: Mod: PO

## 2023-07-25 PROCEDURE — 76000 FLUOROSCOPY <1 HR PHYS/QHP: CPT | Mod: TC,PO

## 2023-07-25 PROCEDURE — 25000003 PHARM REV CODE 250: Mod: PO | Performed by: ANESTHESIOLOGY

## 2023-07-25 PROCEDURE — 25500020 PHARM REV CODE 255: Mod: PO | Performed by: ANESTHESIOLOGY

## 2023-07-25 PROCEDURE — A4216 STERILE WATER/SALINE, 10 ML: HCPCS | Mod: PO | Performed by: ANESTHESIOLOGY

## 2023-07-25 PROCEDURE — 62323 PR INJ LUMBAR/SACRAL, W/IMAGING GUIDANCE: ICD-10-PCS | Mod: ,,, | Performed by: ANESTHESIOLOGY

## 2023-07-25 PROCEDURE — 63600175 PHARM REV CODE 636 W HCPCS: Mod: PO | Performed by: ANESTHESIOLOGY

## 2023-07-25 PROCEDURE — 62323 NJX INTERLAMINAR LMBR/SAC: CPT | Mod: ,,, | Performed by: ANESTHESIOLOGY

## 2023-07-25 PROCEDURE — 62323 NJX INTERLAMINAR LMBR/SAC: CPT | Mod: PO | Performed by: ANESTHESIOLOGY

## 2023-07-25 RX ORDER — METHYLPREDNISOLONE ACETATE 80 MG/ML
INJECTION, SUSPENSION INTRA-ARTICULAR; INTRALESIONAL; INTRAMUSCULAR; SOFT TISSUE
Status: DISCONTINUED | OUTPATIENT
Start: 2023-07-25 | End: 2023-07-25 | Stop reason: HOSPADM

## 2023-07-25 RX ORDER — ALPRAZOLAM 0.5 MG/1
0.5 TABLET, ORALLY DISINTEGRATING ORAL ONCE AS NEEDED
Status: COMPLETED | OUTPATIENT
Start: 2023-07-25 | End: 2023-07-25

## 2023-07-25 RX ORDER — SODIUM CHLORIDE 9 MG/ML
INJECTION, SOLUTION INTRAMUSCULAR; INTRAVENOUS; SUBCUTANEOUS
Status: DISCONTINUED | OUTPATIENT
Start: 2023-07-25 | End: 2023-07-25 | Stop reason: HOSPADM

## 2023-07-25 RX ORDER — LIDOCAINE HYDROCHLORIDE 10 MG/ML
INJECTION, SOLUTION EPIDURAL; INFILTRATION; INTRACAUDAL; PERINEURAL
Status: DISCONTINUED | OUTPATIENT
Start: 2023-07-25 | End: 2023-07-25 | Stop reason: HOSPADM

## 2023-07-25 RX ADMIN — ALPRAZOLAM 0.5 MG: 0.5 TABLET, ORALLY DISINTEGRATING ORAL at 02:07

## 2023-07-25 NOTE — INTERVAL H&P NOTE
The patient has been examined and the H&P has been reviewed:    I concur with the findings and no changes have occurred since H&P was written.  The risks and benefits of this intervention, and alternative therapies were discussed with the patient.  The discussion of risks included infection, bleeding, need for additional procedures or surgery, nerve damage.  Questions regarding the procedure, risks, expected outcome, and possible side effects were solicited and answered to the patient's satisfaction.  Delaney Noel wishes to proceed with the injection or procedure.  Written consent was obtained.      There are no hospital problems to display for this patient.

## 2023-07-25 NOTE — DISCHARGE SUMMARY
[facility]  Discharge Note  Short Stay    Admit Date: 7/25/2023    Discharge Date: 7/25/2023    Attending Physician: Chandler Pereira     Discharge Provider: Chandler Pereira    Diagnoses:  There are no hospital problems to display for this patient.      Discharged Condition: Good    Final Diagnoses: Lumbar radiculopathy [M54.16]    Disposition: Home or Self Care    Hospital Course: No complications, uneventful    Outcome of Hospitalization, Treatment, Procedure, or Surgery:  Patient was admitted for outpatient interventional pain management procedure. The patient tolerated the procedure well with no complications.    Follow up/Patient Instructions:  Follow up as scheduled in Pain Management office in 2-3 weeks.  Patient has received instructions and follow up date and time.    Medications:  Continue previous medications    Discharge Procedure Orders   Notify your health care provider if you experience any of the following:  temperature >100.4     Notify your health care provider if you experience any of the following:  persistent nausea and vomiting or diarrhea     Notify your health care provider if you experience any of the following:  severe uncontrolled pain     Notify your health care provider if you experience any of the following:  redness, tenderness, or signs of infection (pain, swelling, redness, odor or green/yellow discharge around incision site)     Notify your health care provider if you experience any of the following:  difficulty breathing or increased cough     Notify your health care provider if you experience any of the following:  severe persistent headache     Notify your health care provider if you experience any of the following:  worsening rash     Notify your health care provider if you experience any of the following:  persistent dizziness, light-headedness, or visual disturbances     Notify your health care provider if you experience any of the following:  increased confusion or  weakness     Activity as tolerated

## 2023-07-25 NOTE — OP NOTE
"Procedure Note    Procedure Date: 7/25/2023    Procedure Performed:  L5/S1 lumbar interlaminar epidural steroid injection under fluoroscopy.    Indications:  Delaney Noel presents with lumbar radiculitis/radiculopathy secondary to disc herniation, osteophyte/osteophyte complexes, and/or severe degenerative disc disease producing foraminal or central spinal stenosis.  The pain has been present for at least 4 weeks and the patient has failed to respond to noninvasive conservative care.  Pain rated by NRS at baseline prior to intervention is 6/10.  Their radiculitis/radiculopathy and/or neurogenic claudication is severe enough to greatly impact their quality of life or function.     Pre-op diagnosis: Lumbar Radiculopathy    Post-op diagnosis: same    Physician: Chandler Pereira MD    IV anxiolysis medications: none    Medications injected: depomedrol 80mg, 1% Lidocaine 1ml, 2 mL sterile, preservative-free normal saline.    Local anesthetic used: 1% Lidocaine, 1 ml    Estimated Blood Loss: none    Complications:  none    Technique:  The patient was interviewed in the holding area and Risks/Benefits were discussed, including, but not limited to, the possibility of new or different pain, bleeding or infection.   All questions were answered.  The patient understood and accepted risks.  Consent was verfied.  A time-out was taken to identify patient and procedure prior to starting the procedure. The patient was placed in the prone position on the fluoroscopy table. The area of the lumbar spine was prepped with Chloraprep x2 and draped in a sterile manner. The L5/S1 interspace was identified and marked under AP fluoroscopy. The skin and subcutaneous tissues overlying the targeted interspace were anesthetized with 3-5 mL of 1% lidocaine using a 25G, 1.5" needle.  A 20G, 3.5" Tuohy epidural needle was directed toward the interspace under fluoroscopic guidance until the ligamentum flavum was engaged. From this point, a loss of " resistance technique with a glass syringe and saline was used to identify entrance of the needle into the epidural space. Once loss of resistance was observed 1 mL of contrast solution was injected. An appropriate epidurogram was noted.  A 4 mL mixture consisting of saline, 1 mL 1% Lidocaine and 80 mg of depomedrol was injected slowly and without resistance.  The needle was flushed with normal saline and removed. The contrast was seen to be displaced after injection. Patient was awake/responsive during all injections.  The patient tolerated the procedure well and was transferred to the [facility] in stable condition.  The patient was monitored after the procedure and was given post-procedure and discharge instructions to follow at home. The patient was discharged in a stable condition.

## 2023-08-04 ENCOUNTER — OFFICE VISIT (OUTPATIENT)
Dept: NEUROLOGY | Facility: CLINIC | Age: 58
End: 2023-08-04
Payer: COMMERCIAL

## 2023-08-04 DIAGNOSIS — G43.109 MIGRAINE WITH AURA AND WITHOUT STATUS MIGRAINOSUS, NOT INTRACTABLE: Primary | ICD-10-CM

## 2023-08-04 PROCEDURE — 1160F RVW MEDS BY RX/DR IN RCRD: CPT | Mod: CPTII,95,, | Performed by: PHYSICIAN ASSISTANT

## 2023-08-04 PROCEDURE — 99204 OFFICE O/P NEW MOD 45 MIN: CPT | Mod: 95,,, | Performed by: PHYSICIAN ASSISTANT

## 2023-08-04 PROCEDURE — 99204 PR OFFICE/OUTPT VISIT, NEW, LEVL IV, 45-59 MIN: ICD-10-PCS | Mod: 95,,, | Performed by: PHYSICIAN ASSISTANT

## 2023-08-04 PROCEDURE — 1159F PR MEDICATION LIST DOCUMENTED IN MEDICAL RECORD: ICD-10-PCS | Mod: CPTII,95,, | Performed by: PHYSICIAN ASSISTANT

## 2023-08-04 PROCEDURE — 1160F PR REVIEW ALL MEDS BY PRESCRIBER/CLIN PHARMACIST DOCUMENTED: ICD-10-PCS | Mod: CPTII,95,, | Performed by: PHYSICIAN ASSISTANT

## 2023-08-04 PROCEDURE — 1159F MED LIST DOCD IN RCRD: CPT | Mod: CPTII,95,, | Performed by: PHYSICIAN ASSISTANT

## 2023-08-04 NOTE — PROGRESS NOTES
"New Patient     The patient location is: [place]  The chief complaint leading to consultation is: headache     Visit type: audiovisual    Face to Face time with patient: 20 min  27 minutes of total time spent on the encounter, which includes face to face time and non-face to face time preparing to see the patient (eg, review of tests), Obtaining and/or reviewing separately obtained history, Documenting clinical information in the electronic or other health record, Independently interpreting results (not separately reported) and communicating results to the patient/family/caregiver, or Care coordination (not separately reported).     Each patient to whom he or she provides medical services by telemedicine is:  (1) informed of the relationship between the physician and patient and the respective role of any other health care provider with respect to management of the patient; and (2) notified that he or she may decline to receive medical services by telemedicine and may withdraw from such care at any time.    Notes:       SUBJECTIVE:  Patient ID: Delaney Noel   MRN: [id number]  Referred By: No ref. provider found  Chief Complaint: Headache      History of Present Illness:   58 y.o. female with migraine, depression, obesity, HLD, bipolar d/o, hypothyroidism, HTN, back pain, osteoarthritis, h/o asthma when smoking cigarettes but now resolved since smoking cessation, who presents to virtual visit alone for evaluation of headaches.   PMHx negative for TBI, Meningitis, Aneurysms, Kidney Stones, GI bleed, CAD/MI, CVA/TIA, DM, cancer  Family Hx + for Migraines in mother    Pt has a h/o migraines "my whole life" that she has been seeing a neurologist in [place] for (Dr. Simmons). Her ospina's were well controlled on emgality. However, due to her neurologist retiring, pt needs refills and to establish care w/ a HA specialist at [facility]. She feels her ha's recently worsened which she isn't sure if it is due to the heat/humidity recently " or another trigger. She also recalls ajovy being more effective but insurance preferred emgality. She currently takes toradol as an abortive and feels this helps greatly.   Location/Radiation - right frontalis or occipitalis or hemispheric  Quality - throbbing, pressure  Duration - 2 hrs - 3 days  Intensity (range) - mild to severe  Frequency - 6/30 ha days per month  Triggers - msg in food, changes in barometric pressure, work, bright lights,   Prodrome/Aura - tunnel vision  Associated symptoms with the headache: photophobia, phonophobia, osmophobia, dizziness    Treatments Tried   Atenolol  Emgality - helps  Ajovy - helps, but insurance prefers emgality  Lexapro  Trokendi  Depakote   Effexor   Cymbalta   Gabapentin  Lamictal  Toradol - helps  Imitrex 100mg - helped   Nurtec - didn't help    Current Medications:    Current Outpatient Medications:     albuterol (PROVENTIL/VENTOLIN HFA) 90 mcg/actuation inhaler, Inhale 2 puffs into the lungs every 6 (six) hours as needed for Wheezing. Rescue, Disp: 18 g, Rfl: 2    aspirin (ECOTRIN) 81 MG EC tablet, Take 1 tablet (81 mg total) by mouth once daily., Disp: , Rfl: 0    atenoloL (TENORMIN) 50 MG tablet, Take 1 tablet (50 mg total) by mouth once daily., Disp: 90 tablet, Rfl: 3    cetirizine (ZYRTEC) 10 MG tablet, Take 10 mg by mouth once daily. Take one(1) tab by mouth daily, Disp: , Rfl:     clonazePAM (KLONOPIN) 1 MG tablet, Take 1 tablet (1 mg total) by mouth daily as needed for Anxiety., Disp: 30 tablet, Rfl: 5    dicyclomine (BENTYL) 10 MG capsule, TAKE 1 CAPSULE BY MOUTH BEFORE MEALS AND AT BEDTIME AS NEEDED (ABDOMINAL PAIN DIARRHEA)., Disp: 360 capsule, Rfl: 1    EScitalopram oxalate (LEXAPRO) 20 MG tablet, TAKE 1.5 TABLETS BY MOUTH ONCE DAILY., Disp: 135 tablet, Rfl: 3    fluticasone propionate (FLONASE) 50 mcg/actuation nasal spray, 2 sprays (100 mcg total) by Each Nostril route once daily., Disp: 16 g, Rfl: 6    gabapentin (NEURONTIN) 300 MG capsule, gabapentin  300 mg capsule  TAKE 1 CAPSULE BY MOUTH EVERYDAY AT BEDTIME, Disp: 90 capsule, Rfl: 3    galcanezumab-gnlm 120 mg/mL PnIj, Inject 120 mg into the skin every 28 days., Disp: 1 mL, Rfl: 11    ibuprofen (ADVIL,MOTRIN) 800 MG tablet, Take 800 mg by mouth 3 (three) times daily as needed. , Disp: , Rfl:     ketorolac (TORADOL) 10 mg tablet, , Disp: , Rfl:     lamoTRIgine (LAMICTAL) 25 MG tablet, Take 3 tablets (75 mg total) by mouth once daily. Take three tabs(75mg) by mouth daily, Disp: 270 tablet, Rfl: 3    levothyroxine (SYNTHROID) 50 MCG tablet, TAKE 1 TABLET BY MOUTH DAILY FOR 3 DAYS THEN ON 4 TH DAY TAKE 1 AND 1/2 TABLETS BY MOUTH DAILY, Disp: 135 tablet, Rfl: 1    levothyroxine (SYNTHROID) 75 MCG tablet, TAKE 1 TABLET ONCE A DAY ON MONDAYS & WEDNESDAYS AND FRIDAYS, Disp: 36 tablet, Rfl: 9    meloxicam (MOBIC) 15 MG tablet, TAKE 1 TABLET BY MOUTH EVERY DAY, Disp: 90 tablet, Rfl: 1    methocarbamoL (ROBAXIN) 750 MG Tab, Take 1 tablet (750 mg total) by mouth nightly as needed., Disp: 90 tablet, Rfl: 2    mirtazapine (REMERON) 30 MG tablet, mirtazapine 30 mg tablet  1 tablet at bedtime; 30 MG; Once a day, Disp: , Rfl:     QUEtiapine (SEROQUEL) 50 MG tablet, Take 1 tablet (50 mg total) by mouth once daily., Disp: 90 tablet, Rfl: 1    rosuvastatin (CRESTOR) 10 MG tablet, Take 1 tablet (10 mg total) by mouth once daily., Disp: 90 tablet, Rfl: 3    Review of Systems - as per HPI, otherwise a balanced 10 systems review is negative.    OBJECTIVE:  Vitals:  There were no vitals taken for this visit.    Physical Exam: certain limitations due to video visit platform, able to perform the following with the patient's assistance:  Constitutional: she appears well-developed and well-nourished. she is well groomed. NAD   HENT:    Head: Normocephalic and atraumatic  Eyes: Conjunctivae and EOM are normal  Musculoskeletal: Normal range of motion. No joint stiffness.   Psychiatric: Mood and affect are normal    Neuro: Patient is alert  and oriented to person, place, and time. Language is intact and fluent. Speech is clear and fluent. Recent and remote memory are intact.  Normal attention and concentration.  Facial movement is symmetric. Moves all 4 extremities against gravity.     Review of Data:   Labs:  No visits with results within 3 Month(s) from this visit.   Latest known visit with results is:   Lab Visit on 11/08/2022   Component Date Value Ref Range Status    Stool Exam-Ova,Cysts,Parasites 11/08/2022 FINAL 11/11/2022 1848   Final    Giardia Antigen - EIA 11/08/2022 Negative  Negative Final    Cryptosporidium Antigen 11/08/2022 Negative  Negative Final    Stool Culture 11/08/2022 No Salmonella,Shigella,Vibrio,Campylobacter,Yersinia isolated.   Final    Stool WBC 11/08/2022 No neutrophils seen  No neutrophils seen Final    Occult Blood 11/08/2022 Negative  Negative Final    pH, Stool 11/08/2022 7.0  5.0 - 8.5 Final    Elastase 1, Fecal 11/08/2022 >500  >200 (Normal) mcg/g Final    C. diff Antigen 11/08/2022 Negative  Negative Final    C difficile Toxins A+B, EIA 11/08/2022 Negative  Negative Final    Shiga Toxin 1 E.coli 11/08/2022 Negative   Final    Shiga Toxin 2 E.coli 11/08/2022 Negative   Final     Imaging:  No results found for this or any previous visit.  Note: I have independently reviewed any/all imaging/labs/tests and agree with the report (s) as documented.  Any discrepancies will be as noted/demarcated by free text.  CAROL LIMON 8/4/2023    ASSESSMENT:  1. Migraine with aura and without status migrainosus, not intractable          PLAN:  - Discussed symptoms appear to be consistent with migraines. Discussed this with patient along with treatment options and patient agreed with the following plan  - ppx - continue emgality, consider switching to ajovy or making further ppx adjustments if ha's worsen  - abortive - continue toradol tabs, discussed risks, importance of limiting and to avoid other nsaids while taking  - risks, benefits,  and potential side effects of emgality, ajovy, toradol discussed   - alternative treatment options offered   - importance of healthy diet, regular exercise and sleep hygiene in the treatment of headaches    - Start tracking headaches via Migraine Buddy dhara on phone   - RTC in 3 mo      Orders Placed This Encounter    galcanezumab-gnlm 120 mg/mL PnIj       I have discussed realistic goals of care with patient at length as well as medication options, and need for lifestyle adjustment. I have explained that treatment will take time. We have agreed that the goal will be to reduce frequency/intensity/quantity of HA, not to be completely HA free. I have explained my non narcotic policy regarding headache treatment.    Patient agreeable to work on lifestyle adjustments.    Discussed potential for teratogenicity with treatment, patient understands if her family planning status should change she will contact office immediately and we will safely adjust medications as needed.     Questions and concerns were sought and answered to the patient's stated verbal satisfaction.  The patient verbalizes understanding and agreement with the above stated treatment plan.     CC: Yesi Swartz DO Sarena Patel, PA-C  [facility]  [phone]    Dr. Negron was available during today's encounter.

## 2023-08-04 NOTE — PATIENT INSTRUCTIONS
Supplements for Migraine:  1. Magnesium Oxide - 400mg by mouth daily  2. Riboflavin (Vitamin B2) - 400mg by mouth daily  3. Coenzyme Q10 - 200mg tablet by mouth daily    - Please download Migraine Ad dhara on phone and begin tracking your headaches

## 2023-08-22 ENCOUNTER — PATIENT MESSAGE (OUTPATIENT)
Dept: PAIN MEDICINE | Facility: CLINIC | Age: 58
End: 2023-08-22
Payer: COMMERCIAL

## 2023-08-22 ENCOUNTER — OFFICE VISIT (OUTPATIENT)
Dept: PAIN MEDICINE | Facility: CLINIC | Age: 58
End: 2023-08-22
Payer: COMMERCIAL

## 2023-08-22 DIAGNOSIS — M54.41 CHRONIC BILATERAL LOW BACK PAIN WITH RIGHT-SIDED SCIATICA: ICD-10-CM

## 2023-08-22 DIAGNOSIS — M54.16 LUMBAR RADICULOPATHY: Primary | ICD-10-CM

## 2023-08-22 DIAGNOSIS — M47.816 LUMBAR SPONDYLOSIS: ICD-10-CM

## 2023-08-22 DIAGNOSIS — M54.51 VERTEBROGENIC LOW BACK PAIN: ICD-10-CM

## 2023-08-22 DIAGNOSIS — G89.29 CHRONIC BILATERAL LOW BACK PAIN WITH RIGHT-SIDED SCIATICA: ICD-10-CM

## 2023-08-22 PROCEDURE — 99213 PR OFFICE/OUTPT VISIT, EST, LEVL III, 20-29 MIN: ICD-10-PCS | Mod: 95,,,

## 2023-08-22 PROCEDURE — 99213 OFFICE O/P EST LOW 20 MIN: CPT | Mod: 95,,,

## 2023-08-22 NOTE — PROGRESS NOTES
The patient location is:  [place]  The chief complaint leading to consultation is:  Low back pain    Visit type: audiovisual    Face to Face time with patient: 3 min  10 minutes of total time spent on the encounter, which includes face to face time and non-face to face time preparing to see the patient (eg, review of tests), Obtaining and/or reviewing separately obtained history, Documenting clinical information in the electronic or other health record, Independently interpreting results (not separately reported) and communicating results to the patient/family/caregiver, or Care coordination (not separately reported).         Each patient to whom he or she provides medical services by telemedicine is:  (1) informed of the relationship between the physician and patient and the respective role of any other health care provider with respect to management of the patient; and (2) notified that he or she may decline to receive medical services by telemedicine and may withdraw from such care at any time.    Notes:       [facility] Follow Up Evaluation      Referred by: No ref. provider found    PCP: Dr. Swartz    CC:   No chief complaint on file.     No flowsheet data found.    Interval HPI 8/22/2023: Delaney Noel returns for virtual follow up.  She is s/p L5/S1 ANUJA on 07/25/2023 with 85% relief.  Previous right-sided radicular pain has completely resolved.  No significant remaining back pain.  She denies any new numbness, weakness or any new changes to her bowel or bladder function.  Overall, doing very well no significant remaining pain complaints.      HPI:   Delaney Noel is a 58 y.o. female who presents with back pain.  She is had chronic lower back pain for many years it has been gradually worsening.  Today her pain is 8/10, constant, aching, sharp with radiating pain down the back of the right leg stopping at the knee.  She denies any numbness or weakness.      Pain Intervention History:  - s/p  L5/S1 ANUJA on 11/23/2022 with initially 100% relief and now 30% relief.  - s/p L5/S1 ANUJA on 07/25/2023 with 85% relief.    Past Spine Surgical History:      Past and current medications:  Antineuropathics:  NSAIDs: mobic, ibuprofen, toradol  Physical therapy: yes, currently   Antidepressants: gabapentin  Muscle relaxers: robaxin  Opioids:  Antiplatelets/Anticoagulants: aspirin    History:    Current Outpatient Medications:     albuterol (PROVENTIL/VENTOLIN HFA) 90 mcg/actuation inhaler, Inhale 2 puffs into the lungs every 6 (six) hours as needed for Wheezing. Rescue, Disp: 18 g, Rfl: 2    aspirin (ECOTRIN) 81 MG EC tablet, Take 1 tablet (81 mg total) by mouth once daily., Disp: , Rfl: 0    atenoloL (TENORMIN) 50 MG tablet, Take 1 tablet (50 mg total) by mouth once daily., Disp: 90 tablet, Rfl: 3    cetirizine (ZYRTEC) 10 MG tablet, Take 10 mg by mouth once daily. Take one(1) tab by mouth daily, Disp: , Rfl:     clonazePAM (KLONOPIN) 1 MG tablet, Take 1 tablet (1 mg total) by mouth daily as needed for Anxiety., Disp: 30 tablet, Rfl: 5    dicyclomine (BENTYL) 10 MG capsule, TAKE 1 CAPSULE BY MOUTH BEFORE MEALS AND AT BEDTIME AS NEEDED (ABDOMINAL PAIN DIARRHEA)., Disp: 360 capsule, Rfl: 1    EScitalopram oxalate (LEXAPRO) 20 MG tablet, TAKE 1.5 TABLETS BY MOUTH ONCE DAILY., Disp: 135 tablet, Rfl: 3    fluticasone propionate (FLONASE) 50 mcg/actuation nasal spray, 2 sprays (100 mcg total) by Each Nostril route once daily., Disp: 16 g, Rfl: 6    gabapentin (NEURONTIN) 300 MG capsule, gabapentin 300 mg capsule  TAKE 1 CAPSULE BY MOUTH EVERYDAY AT BEDTIME, Disp: 90 capsule, Rfl: 3    galcanezumab-gnlm 120 mg/mL PnIj, Inject 120 mg into the skin every 28 days., Disp: 1 mL, Rfl: 11    ibuprofen (ADVIL,MOTRIN) 800 MG tablet, Take 800 mg by mouth 3 (three) times daily as needed. , Disp: , Rfl:     ketorolac (TORADOL) 10 mg tablet, , Disp: , Rfl:     lamoTRIgine (LAMICTAL) 25 MG tablet, Take 3 tablets (75 mg total) by mouth once  daily. Take three tabs(75mg) by mouth daily, Disp: 270 tablet, Rfl: 3    levothyroxine (SYNTHROID) 50 MCG tablet, TAKE 1 TABLET BY MOUTH DAILY FOR 3 DAYS THEN ON 4 TH DAY TAKE 1 AND 1/2 TABLETS BY MOUTH DAILY, Disp: 135 tablet, Rfl: 1    levothyroxine (SYNTHROID) 75 MCG tablet, TAKE 1 TABLET ONCE A DAY ON MONDAYS & WEDNESDAYS AND FRIDAYS, Disp: 36 tablet, Rfl: 9    meloxicam (MOBIC) 15 MG tablet, TAKE 1 TABLET BY MOUTH EVERY DAY, Disp: 90 tablet, Rfl: 1    methocarbamoL (ROBAXIN) 750 MG Tab, Take 1 tablet (750 mg total) by mouth nightly as needed., Disp: 90 tablet, Rfl: 2    mirtazapine (REMERON) 30 MG tablet, mirtazapine 30 mg tablet  1 tablet at bedtime; 30 MG; Once a day, Disp: , Rfl:     QUEtiapine (SEROQUEL) 50 MG tablet, Take 1 tablet (50 mg total) by mouth once daily., Disp: 90 tablet, Rfl: 1    rosuvastatin (CRESTOR) 10 MG tablet, Take 1 tablet (10 mg total) by mouth once daily., Disp: 90 tablet, Rfl: 3    Past Medical History:   Diagnosis Date    Allergy     Depression     Hx of colonic polyps 08/04/2014    per colonoscopy report    Hypertension     Migraine     Suicidal thoughts 12/2019    [facility] tx     Thyroid disease     Hypothyroid, MNG       Past Surgical History:   Procedure Laterality Date    COLONOSCOPY      COLONOSCOPY N/A 7/1/2020    Procedure: COLONOSCOPY;  Surgeon: Thiago Lozoya Jr., MD;  Location: [facility] ENDO;  Service: Endoscopy;  Laterality: N/A;    cystoscope      EPIDURAL STEROID INJECTION INTO LUMBAR SPINE N/A 11/23/2022    Procedure: Injection-steroid-epidural-lumbar L5/S1;  Surgeon: Chandler Pereira MD;  Location: [facility] OR;  Service: Pain Management;  Laterality: N/A;    EPIDURAL STEROID INJECTION INTO LUMBAR SPINE N/A 7/25/2023    Procedure: Injection-steroid-epidural-lumbar L5/S1;  Surgeon: Chandler Pereira MD;  Location: [facility] OR;  Service: Pain Management;  Laterality: N/A;    HYSTERECTOMY      JAMIE with BSO- age 42    OOPHORECTOMY         Family History   Problem Relation Age  of Onset    Heart disease Mother         had childhood rheumatic fever    Cancer Mother         uterine cancer    Cervical cancer Mother 28    Atrial fibrillation Father     Cancer Maternal Grandmother         uterine cancer    Stroke Maternal Grandmother     Heart disease Maternal Grandmother 50        MI    Stroke Maternal Grandfather     Nephrolithiasis Neg Hx        Social History     Socioeconomic History    Marital status:     Number of children: 2   Tobacco Use    Smoking status: Former     Current packs/day: 0.00     Average packs/day: 0.3 packs/day for 25.0 years (6.3 ttl pk-yrs)     Types: Cigarettes     Start date: 1990     Quit date: 2015     Years since quittin.2    Smokeless tobacco: Never    Tobacco comments:     had quit x 2   Substance and Sexual Activity    Alcohol use: Yes     Alcohol/week: 2.0 standard drinks of alcohol     Types: 2 Cans of beer per week     Comment: once a week     Drug use: No    Sexual activity: Yes     Partners: Male     Birth control/protection: None, See Surgical Hx     Social Determinants of Health     Financial Resource Strain: Low Risk  (8/3/2023)    Overall Financial Resource Strain (CARDIA)     Difficulty of Paying Living Expenses: Not very hard   Food Insecurity: No Food Insecurity (8/3/2023)    Hunger Vital Sign     Worried About Running Out of Food in the Last Year: Never true     Ran Out of Food in the Last Year: Never true   Transportation Needs: No Transportation Needs (8/3/2023)    PRAPARE - Transportation     Lack of Transportation (Medical): No     Lack of Transportation (Non-Medical): No   Physical Activity: Inactive (8/3/2023)    Exercise Vital Sign     Days of Exercise per Week: 0 days     Minutes of Exercise per Session: 0 min   Stress: Stress Concern Present (8/3/2023)    [employer] - Occupational Stress Questionnaire     Feeling of Stress : To some extent   Social Connections: Unknown (8/3/2023)    Social  Connection and Isolation Panel [NHANES]     Frequency of Communication with Friends and Family: Never     Frequency of Social Gatherings with Friends and Family: Never     Active Member of Clubs or Organizations: No     Attends Club or Organization Meetings: Never     Marital Status:    Housing Stability: Low Risk  (8/3/2023)    Housing Stability Vital Sign     Unable to Pay for Housing in the Last Year: No     Number of Places Lived in the Last Year: 1     Unstable Housing in the Last Year: No       Review of patient's allergies indicates:   Allergen Reactions    Hay fever and allergy relief      Patient states environmental allergies; NKDA       Review of Systems:  Positive for joint pain. Balance  of review of systems is negative.    Physical Exam:  There were no vitals filed for this visit.      There is no height or weight on file to calculate BMI.    Gen: NAD  Psych: mood appropriate for given condition  HEENT: eyes anicteric           Imaging:  MRI lumbar spine 11/8/22  FINDINGS:  Alignment: Mild levoconvex curvature of the lumbar spine.     Vertebral column: Vertebral body heights are maintained.  No evidence of an acute fracture or aggressive marrow replacement process. Multilevel disc degeneration, most pronounced at L5-S1 with marked disc space narrowing, degenerative endplate change and marginal osteophyte formation.  Stable probable hemangioma within the L3 vertebral body.     Cord: Normal.  Conus terminates at L1.     Degenerative findings:     T12-L1: No significant disc bulge.  Mild bilateral facet arthropathy.  No neural foraminal or spinal canal stenosis.  L1-L2: No significant disc abnormality.  Mild bilateral facet arthropathy.  There is no neural foraminal stenosis.  There is no spinal canal stenosis.  L2-L3: No significant disc abnormality.  Mild bilateral facet arthropathy.  There is no neural foraminal stenosis.  There is no spinal canal stenosis.  L3-L4: Minimal diffuse disc bulge.   Mild bilateral facet arthropathy and ligamentum flavum thickening.  Mild left neural foraminal stenosis.  There is no spinal canal stenosis.  L4-L5: Mild diffuse disc bulge with mild broad-based right extraforaminal disc protrusion through an annular fissure.  Moderate bilateral facet arthropathy.  Ligamentum flavum thickening.  Mild bilateral neural foraminal stenosis.  Mild spinal canal stenosis.  L5-S1: Marked disc space narrowing with osteophytic ridging and diffuse disc herniation.  Moderate bilateral facet arthropathy.  Ligamentum flavum thickening.  Moderate left and mild-to-moderate right neural foraminal stenosis.  Mild spinal canal stenosis.     Paraspinal muscles & soft tissues: No significant abnormality.    Labs:  Lab Results   Component Value Date    HGBA1C 5.4 10/13/2022       Lab Results   Component Value Date    WBC 6.30 10/13/2022    HGB 14.3 10/13/2022    HCT 43.8 10/13/2022    MCV 91 10/13/2022     10/13/2022           Assessment:   Problem List Items Addressed This Visit    None  Visit Diagnoses       Lumbar radiculopathy    -  Primary    Chronic bilateral low back pain with right-sided sciatica        Lumbar spondylosis        Vertebrogenic low back pain                      58 y.o. year old female with PMH bipolar, HTN, hypothyroidism who presents with back pain.  She is had chronic lower back pain for many years it has been gradually worsening.  Today her pain is 8/10, constant, aching, sharp with radiating pain down the back of the right leg stopping at the knee.  She denies any numbness or weakness.    8/22/2023: Delaney Noel returns for virtual follow up.  She is s/p L5/S1 ANUJA on 07/25/2023 with 85% relief.  Previous right-sided radicular pain has completely resolved.  No significant remaining back pain.  She denies any new numbness, weakness or any new changes to her bowel or bladder function.  Overall, doing very well no significant remaining pain complaints.      - She is s/p  L5/S1 ANUJA on 07/25/2023 with 85% relief  - I independently reviewed her lumbar MRI is consistent with multilevel bilateral facet arthropathy worse at L4-5 and L5-S1 as well as diffuse central disc herniation at L5-S1 causing some mild central canal narrowing and bilateral foraminal narrowing.  Modic type 2 endplate changes at L5/S1.  - she responded very well to the repeat lumbar epidural  - discussed continuing to maintain her at-home PT directed exercises  - follow up as needed  - in future if pain persists or returns shooting later can consider updating her lumbar MRI or potentially intercept procedure for her Modic endplate changes.    : Not applicable    This note was completed with dictation software and grammatical errors may exist.

## 2023-09-14 DIAGNOSIS — M47.816 OSTEOARTHRITIS OF CERVICAL AND LUMBAR SPINE: ICD-10-CM

## 2023-09-14 DIAGNOSIS — M47.812 OSTEOARTHRITIS OF CERVICAL AND LUMBAR SPINE: ICD-10-CM

## 2023-09-14 NOTE — TELEPHONE ENCOUNTER
Care Due:                  Date            Visit Type   Department     Provider  --------------------------------------------------------------------------------                                EP -                              PRIMARY      [facility]  Last Visit: 04-      CARE ([facility])   MEDICINE       Yesi Swartz                              EP -                              PRIMARY      [facility]  Next Visit: 10-      CARE ([facility])   MEDICINE       Yesi Swartz                                                            Last  Test          Frequency    Reason                     Performed    Due Date  --------------------------------------------------------------------------------    CMP.........  12 months..  rosuvastatin.............  10-   10-    Lipid Panel.  12 months..  rosuvastatin.............  10-   10-    [facility] Embedded Care Due Messages. Reference number: [id number].   9/14/2023 10:13:37 AM CDT

## 2023-09-15 RX ORDER — QUETIAPINE FUMARATE 50 MG/1
50 TABLET, FILM COATED ORAL DAILY
Qty: 90 TABLET | Refills: 1 | Status: SHIPPED | OUTPATIENT
Start: 2023-09-15 | End: 2023-11-28

## 2023-09-15 RX ORDER — LEVOTHYROXINE SODIUM 50 UG/1
TABLET ORAL
Qty: 135 TABLET | Refills: 1 | Status: SHIPPED | OUTPATIENT
Start: 2023-09-15 | End: 2023-11-28

## 2023-09-15 RX ORDER — CLONAZEPAM 1 MG/1
1 TABLET ORAL DAILY PRN
Qty: 30 TABLET | Refills: 5 | Status: SHIPPED | OUTPATIENT
Start: 2023-09-15 | End: 2024-03-23 | Stop reason: SDUPTHER

## 2023-09-15 RX ORDER — MELOXICAM 15 MG/1
15 TABLET ORAL DAILY
Qty: 90 TABLET | Refills: 1 | Status: SHIPPED | OUTPATIENT
Start: 2023-09-15

## 2023-11-14 DIAGNOSIS — G43.109 MIGRAINE WITH AURA AND WITHOUT STATUS MIGRAINOSUS, NOT INTRACTABLE: ICD-10-CM

## 2023-11-14 RX ORDER — ESCITALOPRAM OXALATE 20 MG/1
30 TABLET ORAL DAILY
Qty: 135 TABLET | Refills: 3 | Status: SHIPPED | OUTPATIENT
Start: 2023-11-14

## 2023-11-14 NOTE — TELEPHONE ENCOUNTER
Care Due:                  Date            Visit Type   Department     Provider  --------------------------------------------------------------------------------                                EP -                              PRIMARY      [facility]  Last Visit: 04-      CARE ([facility])   MEDICINE       Yesi Swartz                              EP -                              PRIMARY      [facility]  Next Visit: 11-      CARE ([facility])   [facility]       Yesi Swartz                                                            Last  Test          Frequency    Reason                     Performed    Due Date  --------------------------------------------------------------------------------    CBC.........  12 months..  meloxicam................  10-   10-    CMP.........  12 months..  meloxicam, rosuvastatin..  10-   10-    Lipid Panel.  12 months..  rosuvastatin.............  10-   10-    [facility] Embedded Care Due Messages. Reference number: [id number].   11/14/2023 9:58:03 AM CST

## 2023-11-20 ENCOUNTER — PATIENT MESSAGE (OUTPATIENT)
Dept: NEUROLOGY | Facility: CLINIC | Age: 58
End: 2023-11-20
Payer: COMMERCIAL

## 2023-11-22 ENCOUNTER — LAB VISIT (OUTPATIENT)
Dept: LAB | Facility: HOSPITAL | Age: 58
End: 2023-11-22
Attending: INTERNAL MEDICINE
Payer: COMMERCIAL

## 2023-11-22 ENCOUNTER — TELEPHONE (OUTPATIENT)
Dept: FAMILY MEDICINE | Facility: CLINIC | Age: 58
End: 2023-11-22
Payer: COMMERCIAL

## 2023-11-22 DIAGNOSIS — Z00.00 ROUTINE GENERAL MEDICAL EXAMINATION AT A HEALTH CARE FACILITY: Primary | ICD-10-CM

## 2023-11-22 DIAGNOSIS — Z00.00 ROUTINE GENERAL MEDICAL EXAMINATION AT A HEALTH CARE FACILITY: ICD-10-CM

## 2023-11-22 DIAGNOSIS — K52.9 CHRONIC DIARRHEA: ICD-10-CM

## 2023-11-22 LAB
ALBUMIN SERPL BCP-MCNC: 3.9 G/DL (ref 3.5–5.2)
ALP SERPL-CCNC: 80 U/L (ref 55–135)
ALT SERPL W/O P-5'-P-CCNC: 21 U/L (ref 10–44)
ANION GAP SERPL CALC-SCNC: 9 MMOL/L (ref 8–16)
AST SERPL-CCNC: 22 U/L (ref 10–40)
BASOPHILS # BLD AUTO: 0.04 K/UL (ref 0–0.2)
BASOPHILS NFR BLD: 0.6 % (ref 0–1.9)
BILIRUB SERPL-MCNC: 0.9 MG/DL (ref 0.1–1)
BUN SERPL-MCNC: 17 MG/DL (ref 6–20)
CALCIUM SERPL-MCNC: 9.7 MG/DL (ref 8.7–10.5)
CHLORIDE SERPL-SCNC: 105 MMOL/L (ref 95–110)
CHOLEST SERPL-MCNC: 139 MG/DL (ref 120–199)
CHOLEST/HDLC SERPL: 4.2 {RATIO} (ref 2–5)
CO2 SERPL-SCNC: 28 MMOL/L (ref 23–29)
CREAT SERPL-MCNC: 1 MG/DL (ref 0.5–1.4)
DIFFERENTIAL METHOD: ABNORMAL
EOSINOPHIL # BLD AUTO: 0.3 K/UL (ref 0–0.5)
EOSINOPHIL NFR BLD: 4.1 % (ref 0–8)
ERYTHROCYTE [DISTWIDTH] IN BLOOD BY AUTOMATED COUNT: 12.7 % (ref 11.5–14.5)
EST. GFR  (NO RACE VARIABLE): >60 ML/MIN/1.73 M^2
ESTIMATED AVG GLUCOSE: 100 MG/DL (ref 68–131)
GLUCOSE SERPL-MCNC: 111 MG/DL (ref 70–110)
HBA1C MFR BLD: 5.1 % (ref 4–5.6)
HCT VFR BLD AUTO: 45.4 % (ref 37–48.5)
HDLC SERPL-MCNC: 33 MG/DL (ref 40–75)
HDLC SERPL: 23.7 % (ref 20–50)
HGB BLD-MCNC: 14.8 G/DL (ref 12–16)
IGA SERPL-MCNC: 101 MG/DL (ref 40–350)
IMM GRANULOCYTES # BLD AUTO: 0.02 K/UL (ref 0–0.04)
IMM GRANULOCYTES NFR BLD AUTO: 0.3 % (ref 0–0.5)
LDLC SERPL CALC-MCNC: 66.2 MG/DL (ref 63–159)
LYMPHOCYTES # BLD AUTO: 1 K/UL (ref 1–4.8)
LYMPHOCYTES NFR BLD: 15.7 % (ref 18–48)
MCH RBC QN AUTO: 29.1 PG (ref 27–31)
MCHC RBC AUTO-ENTMCNC: 32.6 G/DL (ref 32–36)
MCV RBC AUTO: 89 FL (ref 82–98)
MONOCYTES # BLD AUTO: 0.5 K/UL (ref 0.3–1)
MONOCYTES NFR BLD: 8.6 % (ref 4–15)
NEUTROPHILS # BLD AUTO: 4.5 K/UL (ref 1.8–7.7)
NEUTROPHILS NFR BLD: 70.7 % (ref 38–73)
NONHDLC SERPL-MCNC: 106 MG/DL
NRBC BLD-RTO: 0 /100 WBC
PLATELET # BLD AUTO: 210 K/UL (ref 150–450)
PMV BLD AUTO: 11.8 FL (ref 9.2–12.9)
POTASSIUM SERPL-SCNC: 4.2 MMOL/L (ref 3.5–5.1)
PROT SERPL-MCNC: 6.8 G/DL (ref 6–8.4)
RBC # BLD AUTO: 5.09 M/UL (ref 4–5.4)
SODIUM SERPL-SCNC: 142 MMOL/L (ref 136–145)
T4 FREE SERPL-MCNC: 0.67 NG/DL (ref 0.71–1.51)
TRIGL SERPL-MCNC: 199 MG/DL (ref 30–150)
TSH SERPL DL<=0.005 MIU/L-ACNC: 4.68 UIU/ML (ref 0.4–4)
WBC # BLD AUTO: 6.3 K/UL (ref 3.9–12.7)

## 2023-11-22 PROCEDURE — 84443 ASSAY THYROID STIM HORMONE: CPT | Performed by: INTERNAL MEDICINE

## 2023-11-22 PROCEDURE — 83036 HEMOGLOBIN GLYCOSYLATED A1C: CPT | Performed by: INTERNAL MEDICINE

## 2023-11-22 PROCEDURE — 82784 ASSAY IGA/IGD/IGG/IGM EACH: CPT | Performed by: NURSE PRACTITIONER

## 2023-11-22 PROCEDURE — 85025 COMPLETE CBC W/AUTO DIFF WBC: CPT | Performed by: INTERNAL MEDICINE

## 2023-11-22 PROCEDURE — 86364 TISS TRNSGLTMNASE EA IG CLAS: CPT | Performed by: NURSE PRACTITIONER

## 2023-11-22 PROCEDURE — 36415 COLL VENOUS BLD VENIPUNCTURE: CPT | Mod: PO | Performed by: NURSE PRACTITIONER

## 2023-11-22 PROCEDURE — 80061 LIPID PANEL: CPT | Performed by: INTERNAL MEDICINE

## 2023-11-22 PROCEDURE — 84439 ASSAY OF FREE THYROXINE: CPT | Performed by: INTERNAL MEDICINE

## 2023-11-22 PROCEDURE — 80053 COMPREHEN METABOLIC PANEL: CPT | Performed by: INTERNAL MEDICINE

## 2023-11-22 NOTE — TELEPHONE ENCOUNTER
Lab orders were entered as future. Pt is currently at the lab for blood work. Orders needed to be re-entered.

## 2023-11-27 LAB — TTG IGA SER-ACNC: 0.7 U/ML

## 2023-11-28 ENCOUNTER — OFFICE VISIT (OUTPATIENT)
Dept: FAMILY MEDICINE | Facility: CLINIC | Age: 58
End: 2023-11-28
Payer: COMMERCIAL

## 2023-11-28 VITALS
HEIGHT: 63 IN | HEART RATE: 79 BPM | WEIGHT: 195.69 LBS | OXYGEN SATURATION: 95 % | SYSTOLIC BLOOD PRESSURE: 126 MMHG | BODY MASS INDEX: 34.67 KG/M2 | TEMPERATURE: 98 F | RESPIRATION RATE: 16 BRPM | DIASTOLIC BLOOD PRESSURE: 78 MMHG

## 2023-11-28 DIAGNOSIS — J30.9 CHRONIC ALLERGIC RHINITIS: ICD-10-CM

## 2023-11-28 DIAGNOSIS — F51.04 PSYCHOPHYSIOLOGICAL INSOMNIA: ICD-10-CM

## 2023-11-28 DIAGNOSIS — E78.5 HYPERLIPIDEMIA, UNSPECIFIED HYPERLIPIDEMIA TYPE: ICD-10-CM

## 2023-11-28 DIAGNOSIS — R07.9 CHEST PAIN, UNSPECIFIED TYPE: ICD-10-CM

## 2023-11-28 DIAGNOSIS — E03.9 HYPOTHYROIDISM, UNSPECIFIED TYPE: ICD-10-CM

## 2023-11-28 DIAGNOSIS — K58.0 IRRITABLE BOWEL SYNDROME WITH DIARRHEA: ICD-10-CM

## 2023-11-28 DIAGNOSIS — R91.1 LUNG NODULE: ICD-10-CM

## 2023-11-28 DIAGNOSIS — I10 ESSENTIAL HYPERTENSION: ICD-10-CM

## 2023-11-28 DIAGNOSIS — Z00.00 WELL ADULT EXAM: Primary | ICD-10-CM

## 2023-11-28 DIAGNOSIS — E66.09 CLASS 1 OBESITY DUE TO EXCESS CALORIES WITH SERIOUS COMORBIDITY AND BODY MASS INDEX (BMI) OF 32.0 TO 32.9 IN ADULT: ICD-10-CM

## 2023-11-28 DIAGNOSIS — F41.0 PANIC ATTACK: ICD-10-CM

## 2023-11-28 DIAGNOSIS — F41.1 GAD (GENERALIZED ANXIETY DISORDER): ICD-10-CM

## 2023-11-28 DIAGNOSIS — Z23 NEED FOR VACCINATION: ICD-10-CM

## 2023-11-28 DIAGNOSIS — Z12.31 ENCOUNTER FOR SCREENING MAMMOGRAM FOR MALIGNANT NEOPLASM OF BREAST: ICD-10-CM

## 2023-11-28 DIAGNOSIS — F31.81 BIPOLAR 2 DISORDER: ICD-10-CM

## 2023-11-28 DIAGNOSIS — Z78.0 ASYMPTOMATIC MENOPAUSAL STATE: ICD-10-CM

## 2023-11-28 DIAGNOSIS — G43.009 MIGRAINE WITHOUT AURA AND WITHOUT STATUS MIGRAINOSUS, NOT INTRACTABLE: ICD-10-CM

## 2023-11-28 DIAGNOSIS — F43.10 PTSD (POST-TRAUMATIC STRESS DISORDER): ICD-10-CM

## 2023-11-28 DIAGNOSIS — M47.27 OSTEOARTHRITIS OF SPINE WITH RADICULOPATHY, LUMBOSACRAL REGION: ICD-10-CM

## 2023-11-28 DIAGNOSIS — F33.1 MAJOR DEPRESSIVE DISORDER, RECURRENT EPISODE, MODERATE: ICD-10-CM

## 2023-11-28 DIAGNOSIS — G47.33 OSA (OBSTRUCTIVE SLEEP APNEA): ICD-10-CM

## 2023-11-28 DIAGNOSIS — I25.118 CORONARY ARTERY DISEASE INVOLVING NATIVE CORONARY ARTERY OF NATIVE HEART WITH OTHER FORM OF ANGINA PECTORIS: ICD-10-CM

## 2023-11-28 PROCEDURE — 1160F RVW MEDS BY RX/DR IN RCRD: CPT | Mod: CPTII,S$GLB,, | Performed by: INTERNAL MEDICINE

## 2023-11-28 PROCEDURE — 99396 PR PREVENTIVE VISIT,EST,40-64: ICD-10-PCS | Mod: 25,S$GLB,, | Performed by: INTERNAL MEDICINE

## 2023-11-28 PROCEDURE — 1160F PR REVIEW ALL MEDS BY PRESCRIBER/CLIN PHARMACIST DOCUMENTED: ICD-10-PCS | Mod: CPTII,S$GLB,, | Performed by: INTERNAL MEDICINE

## 2023-11-28 PROCEDURE — 1159F MED LIST DOCD IN RCRD: CPT | Mod: CPTII,S$GLB,, | Performed by: INTERNAL MEDICINE

## 2023-11-28 PROCEDURE — 90471 IMMUNIZATION ADMIN: CPT | Mod: S$GLB,,, | Performed by: INTERNAL MEDICINE

## 2023-11-28 PROCEDURE — 3044F HG A1C LEVEL LT 7.0%: CPT | Mod: CPTII,S$GLB,, | Performed by: INTERNAL MEDICINE

## 2023-11-28 PROCEDURE — 93005 EKG 12-LEAD: ICD-10-PCS | Mod: S$GLB,,, | Performed by: INTERNAL MEDICINE

## 2023-11-28 PROCEDURE — 1159F PR MEDICATION LIST DOCUMENTED IN MEDICAL RECORD: ICD-10-PCS | Mod: CPTII,S$GLB,, | Performed by: INTERNAL MEDICINE

## 2023-11-28 PROCEDURE — 90686 IIV4 VACC NO PRSV 0.5 ML IM: CPT | Mod: S$GLB,,, | Performed by: INTERNAL MEDICINE

## 2023-11-28 PROCEDURE — 3074F PR MOST RECENT SYSTOLIC BLOOD PRESSURE < 130 MM HG: ICD-10-PCS | Mod: CPTII,S$GLB,, | Performed by: INTERNAL MEDICINE

## 2023-11-28 PROCEDURE — 93010 EKG 12-LEAD: ICD-10-PCS | Mod: S$GLB,,, | Performed by: INTERNAL MEDICINE

## 2023-11-28 PROCEDURE — 3008F PR BODY MASS INDEX (BMI) DOCUMENTED: ICD-10-PCS | Mod: CPTII,S$GLB,, | Performed by: INTERNAL MEDICINE

## 2023-11-28 PROCEDURE — 3044F PR MOST RECENT HEMOGLOBIN A1C LEVEL <7.0%: ICD-10-PCS | Mod: CPTII,S$GLB,, | Performed by: INTERNAL MEDICINE

## 2023-11-28 PROCEDURE — 3078F DIAST BP <80 MM HG: CPT | Mod: CPTII,S$GLB,, | Performed by: INTERNAL MEDICINE

## 2023-11-28 PROCEDURE — 90471 FLU VACCINE (QUAD) GREATER THAN OR EQUAL TO 3YO PRESERVATIVE FREE IM: ICD-10-PCS | Mod: S$GLB,,, | Performed by: INTERNAL MEDICINE

## 2023-11-28 PROCEDURE — 3078F PR MOST RECENT DIASTOLIC BLOOD PRESSURE < 80 MM HG: ICD-10-PCS | Mod: CPTII,S$GLB,, | Performed by: INTERNAL MEDICINE

## 2023-11-28 PROCEDURE — 99396 PREV VISIT EST AGE 40-64: CPT | Mod: 25,S$GLB,, | Performed by: INTERNAL MEDICINE

## 2023-11-28 PROCEDURE — 93005 ELECTROCARDIOGRAM TRACING: CPT | Mod: S$GLB,,, | Performed by: INTERNAL MEDICINE

## 2023-11-28 PROCEDURE — 3008F BODY MASS INDEX DOCD: CPT | Mod: CPTII,S$GLB,, | Performed by: INTERNAL MEDICINE

## 2023-11-28 PROCEDURE — 93010 ELECTROCARDIOGRAM REPORT: CPT | Mod: S$GLB,,, | Performed by: INTERNAL MEDICINE

## 2023-11-28 PROCEDURE — 3074F SYST BP LT 130 MM HG: CPT | Mod: CPTII,S$GLB,, | Performed by: INTERNAL MEDICINE

## 2023-11-28 PROCEDURE — 90686 FLU VACCINE (QUAD) GREATER THAN OR EQUAL TO 3YO PRESERVATIVE FREE IM: ICD-10-PCS | Mod: S$GLB,,, | Performed by: INTERNAL MEDICINE

## 2023-11-28 RX ORDER — QUETIAPINE FUMARATE 100 MG/1
100 TABLET, FILM COATED ORAL DAILY
Qty: 90 TABLET | Refills: 3 | Status: SHIPPED | OUTPATIENT
Start: 2023-11-28 | End: 2024-11-27

## 2023-11-28 RX ORDER — LAMOTRIGINE 25 MG/1
75 TABLET ORAL DAILY
Qty: 270 TABLET | Refills: 3 | Status: SHIPPED | OUTPATIENT
Start: 2023-11-28

## 2023-11-28 RX ORDER — LEVOTHYROXINE SODIUM 75 UG/1
75 TABLET ORAL
Qty: 90 TABLET | Refills: 3 | Status: SHIPPED | OUTPATIENT
Start: 2023-11-28

## 2023-11-28 NOTE — PROGRESS NOTES
Subjective:       Patient ID: Delaney Noel is a 58 y.o. female.    Medication List with Changes/Refills   New Medications    QUETIAPINE (SEROQUEL) 100 MG TAB    Take 1 tablet (100 mg total) by mouth once daily.   Current Medications    ALBUTEROL (PROVENTIL/VENTOLIN HFA) 90 MCG/ACTUATION INHALER    Inhale 2 puffs into the lungs every 6 (six) hours as needed for Wheezing. Rescue    ASPIRIN (ECOTRIN) 81 MG EC TABLET    Take 1 tablet (81 mg total) by mouth once daily.    ATENOLOL (TENORMIN) 50 MG TABLET    Take 1 tablet (50 mg total) by mouth once daily.    CETIRIZINE (ZYRTEC) 10 MG TABLET    Take 10 mg by mouth once daily. Take one(1) tab by mouth daily    CLONAZEPAM (KLONOPIN) 1 MG TABLET    Take 1 tablet (1 mg total) by mouth daily as needed for Anxiety.    DICYCLOMINE (BENTYL) 10 MG CAPSULE    TAKE 1 CAPSULE BY MOUTH BEFORE MEALS AND AT BEDTIME AS NEEDED (ABDOMINAL PAIN DIARRHEA).    ESCITALOPRAM OXALATE (LEXAPRO) 20 MG TABLET    Take 1.5 tablets (30 mg total) by mouth once daily.    FLUTICASONE PROPIONATE (FLONASE) 50 MCG/ACTUATION NASAL SPRAY    2 sprays (100 mcg total) by Each Nostril route once daily.    GABAPENTIN (NEURONTIN) 300 MG CAPSULE    gabapentin 300 mg capsule   TAKE 1 CAPSULE BY MOUTH EVERYDAY AT BEDTIME    GALCANEZUMAB-GNLM 120 MG/ML PNIJ    Inject 120 mg into the skin every 28 days.    IBUPROFEN (ADVIL,MOTRIN) 800 MG TABLET    Take 800 mg by mouth 3 (three) times daily as needed.     KETOROLAC (TORADOL) 10 MG TABLET        MELOXICAM (MOBIC) 15 MG TABLET    Take 1 tablet (15 mg total) by mouth once daily.    METHOCARBAMOL (ROBAXIN) 750 MG TAB    Take 1 tablet (750 mg total) by mouth nightly as needed.    ROSUVASTATIN (CRESTOR) 10 MG TABLET    Take 1 tablet (10 mg total) by mouth once daily.   Changed and/or Refilled Medications    Modified Medication Previous Medication    LAMOTRIGINE (LAMICTAL) 25 MG TABLET lamoTRIgine (LAMICTAL) 25 MG tablet       Take 3 tablets (75 mg total) by mouth once  daily. Take three tabs(75mg) by mouth daily    Take 3 tablets (75 mg total) by mouth once daily. Take three tabs(75mg) by mouth daily    LEVOTHYROXINE (SYNTHROID) 75 MCG TABLET levothyroxine (SYNTHROID) 75 MCG tablet       Take 1 tablet (75 mcg total) by mouth before breakfast.    TAKE 1 TABLET ONCE A DAY ON MONDAYS & WEDNESDAYS AND FRIDAYS   Discontinued Medications    LEVOTHYROXINE (SYNTHROID) 50 MCG TABLET    TAKE 1 TABLET BY MOUTH DAILY FOR 3 DAYS THEN ON 4 TH DAY TAKE 1 AND 1/2 TABLETS BY MOUTH DAILY    MIRTAZAPINE (REMERON) 30 MG TABLET    mirtazapine 30 mg tablet   1 tablet at bedtime; 30 MG; Once a day    QUETIAPINE (SEROQUEL) 50 MG TABLET    Take 1 tablet (50 mg total) by mouth once daily.       Chief Complaint: Annual Exam  She is here today to f/u on chronic medical issues.      She has hypertension and is taking atenolol 50 mg qday.  She had a positive stress test and then subsequent cath (do not have records) and she reports she had non-obstructive CAD. She does complain of chest pain over the last couple months with exertion. Occasionally she will get symptoms at rest. It radiates to her back and lasts about 3-5 minutes. Described as sharp pain without shortness of breath. No lower leg swelling.        She has hyperlipidemia and is taking crestor 10 mg qday. Her lipids on 11/2023 were 139/199/33/66.  She is on aspirin daily.        She has hypothyroid and is taking levothyroxine 50 mcg qday on Tues-Thurs-Sat and Sun and 75 mcg qday on Mon-Wed-Fri.  Her TSH was elevated to 4.6 on 11/2023.  She did have a thyroid u/s on 11/2018 showing thyroiditis and unchanged from previous. She reports enlarging neck and is concerned for a goiter.      She has a small 4 mm lung nodule seen on CT on 7/2014. Repeat CT on on 10/2022 was unchanged. No further imaging needed.      She has fatty liver seen on CT on 12/2019. She has normal LFTs on 11/2023.      She has IBS with diarrhea. She has normal stools studies on  11/2021.  She takes bentyle nightly and has done well. She was seen by GI on 10/2022 who recommended a colonoscopy but she did not want this done. Overall her symptoms are improved on bentyl.      She has chronic migraines and is followed by neurology.  She is taking emgality monthly and toradol as needed. Symptoms associated with migraines are sensitive to light with nausea, dizziness and confusion.  She reports having increased headaches over the last couple months. She is having 4 to 5 migraines a month. She was seen by neurology on 8/2023 and is considering switching her to avojy. She is due to f/u in January.      She has LOUISE and is using cpap. Diagnosed with sleep study on 12/2022.      She has multiple psychiatric issues. She has major depression with suicidal ideations (recent hospitalization for suicidal thoughts on 12/3/2019), anxiety with panic attack, bipolar type 2, PTSD with insomnia and OCD. She continues on klonopin 1 mg daily, lamictal 75 mg daily and lexapro 20 mg daily and seroquel 50 mg nightly.  She does report increasing depression and difficulty sleeping. She would like to increase her dose of seroquel to help with sleep. She denies any suicidal ideations at this time.      She has chronic low back pain since 10/2017. Around the time it was diagnosed she was having pain down her right leg and trouble walking.  She had an MRI of lumbar and cervical spine in 1/2019 showing mild spondylotic changes in L4-L5, L5-S1 and mild disease in cervical vertebra. She completed  PT.  She is taking mobic 15 mg qam and robaxin 750 mg qhs PRN muscle spasms and gabapentin 300 mg when she has radicular pain.  She reports pain worse with prolonged sitting or driving, and better with moving, home exercises, mobic and gabapentin.  Rated 1/10 to 10/10.  No weakness but her pain does radiate down her right leg.  She continues to have worsening low back pain and is able to do less due to her pain.  She did have an ANUJA  "on 7/2023 which improved her pain.  She avoids lifting which seems to trigger her pain.      She lives with her  and feels safe at home. She does not exercise.  She is no longer working. She enjoys traveling in her RV.      Colonoscopy---7/2020  Mammogram----3/2023 neg   DEXA---none   Pap-----12/2017 neg HPV neg---JAMIE  Tdap---9/2019  Influenza vaccine-----10/2021   Prevnar 20--- 4/2023  Shingles vaccine-----10/2022  Covid vaccine----4 doses      Review of Systems   Constitutional:  Negative for appetite change, fatigue, fever and unexpected weight change.   HENT:  Negative for congestion, ear pain, hearing loss, sore throat and trouble swallowing.    Eyes:  Negative for pain and visual disturbance.   Respiratory:  Negative for cough, chest tightness, shortness of breath and wheezing.    Cardiovascular:  Positive for chest pain. Negative for palpitations and leg swelling.   Gastrointestinal:  Positive for diarrhea. Negative for abdominal pain, blood in stool, constipation, nausea and vomiting.   Endocrine: Negative for polyuria.   Genitourinary:  Negative for dysuria and hematuria.   Musculoskeletal:  Positive for back pain. Negative for arthralgias and myalgias.   Skin:  Negative for rash.   Neurological:  Positive for light-headedness and headaches. Negative for dizziness, weakness and numbness.   Hematological:  Does not bruise/bleed easily.   Psychiatric/Behavioral:  Positive for dysphoric mood and sleep disturbance. Negative for suicidal ideas. The patient is nervous/anxious.        Objective:      Vitals:    11/28/23 0704   BP: 126/78   Pulse: 79   Resp: 16   Temp: 98.1 °F (36.7 °C)   SpO2: 95%   Weight: 88.7 kg (195 lb 10.5 oz)   Height: 5' 3" (1.6 m)     Body mass index is 34.66 kg/m².  Physical Exam    General appearance: No acute distress, cooperative  Eyes: PERRL, EOMI, conjunctiva clear  Ears: normal external ear and pinna, tm clear without drainage, canals clear  Nose: Normal mucosa without " drainage  Throat: no exudates or erythema, tonsils not enlarged  Mouth: no sores or lesions, moist mucous membranes  Neck: FROM, soft, supple, no thyromegaly, no bruits  Lymph: no anterior or posterior cervical adenopathy  Heart::  Regular rate and rhythm, no murmur  Lung: Clear to ascultation bilaterally, no wheezing, no rales, no rhonchi, no distress  Abdomen: Soft, nontender, no distention, no hepatosplenomegaly, bowel sounds normal, no guarding, no rebound, no peritoneal signs  Skin: no rashes, no lesions  Extremities: no edema, no cyanosis  Neuro: CN 2-12 intact, 5/5 muscle strength upper and lower extremity bilaterally, 2+ DTRs UE and LE bilaterally, normal gait  Peripheral pulses: 2+ pedal pulses bilaterally, good perfusion and color  Musculoskeletal: FROM, good strenth, no tenderness  Joint: normal appearance, no swelling, no warmth, no deformity in all joints    Assessment:       1. Well adult exam    2. Coronary artery disease involving native coronary artery of native heart with other form of angina pectoris    3. Chest pain, unspecified type    4. Essential hypertension    5. Hyperlipidemia, unspecified hyperlipidemia type    6. Lung nodule    7. Chronic allergic rhinitis    8. Hypothyroidism, unspecified type    9. Irritable bowel syndrome with diarrhea    10. Migraine without aura and without status migrainosus, not intractable    11. Major depressive disorder, recurrent episode, moderate    12. Bipolar 2 disorder    13. PTSD (post-traumatic stress disorder)    14. HUNTER (generalized anxiety disorder)    15. Panic attack    16. Psychophysiological insomnia    17. LOUISE (obstructive sleep apnea)    18. Osteoarthritis of spine with radiculopathy, lumbosacral region    19. Class 1 obesity due to excess calories with serious comorbidity and body mass index (BMI) of 32.0 to 32.9 in adult    20. Asymptomatic menopausal state    21. Encounter for screening mammogram for malignant neoplasm of breast        Plan:        Well adult exam  She is UTD on labs, mammogram and colonoscopy. Will get DEXA due to chronic antiepileptic medication.  Given influenza vaccine today.     Coronary artery disease involving native coronary artery of native heart with angina pectoris/chest pain   She continues on statin and aspirin. She does have chest pain and will get EKG and stress test.   -     IN OFFICE EKG 12-LEAD (to Muse)  -     Nuclear Stress - Cardiology Interpreted; Future    Essential hypertension  Well controlled and continue current regimen.   -     CBC Auto Differential; Future; Expected date: 11/28/2023  -     Comprehensive Metabolic Panel; Future; Expected date: 11/28/2023  -     TSH; Future; Expected date: 11/28/2023    Hyperlipidemia, unspecified hyperlipidemia type  Good control on crestor and aspirin    Lung nodule  No further imaging needed    Chronic allergic rhinitis  Well controlled and continue current regimen.     Hypothyroidism, unspecified type  Uncontrolled and TSH slow. Will increase her dose to levothyroxine 75 mcg daily. Recheck TSH in 6 weeks.   -     levothyroxine (SYNTHROID) 75 MCG tablet; Take 1 tablet (75 mcg total) by mouth before breakfast.  Dispense: 90 tablet; Refill: 3  -     TSH; Future; Expected date: 11/28/2023  -      Soft Tissue Head Neck Thyroid; Future; Expected date: 11/28/2023    Irritable bowel syndrome with diarrhea  Improved on bentyl daily    Migraine without aura and without status migrainosus, not intractable  Uncontrolled with multiple migraines. She is to f/u with neurology in January to discuss.     Major depressive disorder, recurrent episode, moderate  Mild increase in symptoms. Will increase her dose of seroquel and adjust her thyroid medication. Continue to monitor    Bipolar 2 disorder  Stable on lamictal  -     lamoTRIgine (LAMICTAL) 25 MG tablet; Take 3 tablets (75 mg total) by mouth once daily. Take three tabs(75mg) by mouth daily  Dispense: 270 tablet; Refill: 3  -      QUEtiapine (SEROQUEL) 100 MG Tab; Take 1 tablet (100 mg total) by mouth once daily.  Dispense: 90 tablet; Refill: 3    PTSD (post-traumatic stress disorder)  Stable and continue to monitor    HUNTER (generalized anxiety disorder) with Panic attack  She has been doing well and denies any panic attacks. Continue on lexapro    Psychophysiological insomnia  Uncontrolled and will increase her dose of seroquel    LOUISE (obstructive sleep apnea)  Stable and patient is compliant with use. Patient benefits from regular use of CPAP.     Osteoarthritis of spine with radiculopathy, lumbosacral region  Uncontrolled but improved after ANUJA. She avoids lifting.  Continue current regimen.    Class 1 obesity due to excess calories with serious comorbidity and body mass index (BMI) of 32.0 to 32.9 in adult  Long discussion on the benefits of healthy eating and regular exercise to help lose weight and help control cad, hypertension, hyperlipidemia and LOUISE.     Asymptomatic menopausal state  -     DXA Bone Density Axial Skeleton 1 or more sites; Future; Expected date: 11/28/2023    Encounter for screening mammogram for malignant neoplasm of breast  -     Mammo Digital Screening Bilat w/ Yair; Future; Expected date: 11/28/2023    Follow up in about 6 months (around 5/28/2024) for chronic medical issues.

## 2023-12-01 ENCOUNTER — HOSPITAL ENCOUNTER (OUTPATIENT)
Dept: RADIOLOGY | Facility: HOSPITAL | Age: 58
Discharge: HOME | End: 2023-12-01
Attending: INTERNAL MEDICINE
Payer: COMMERCIAL

## 2023-12-01 DIAGNOSIS — E03.9 HYPOTHYROIDISM, UNSPECIFIED TYPE: ICD-10-CM

## 2023-12-01 PROCEDURE — 76536 US EXAM OF HEAD AND NECK: CPT | Mod: TC,PO

## 2023-12-01 PROCEDURE — 76536 US SOFT TISSUE HEAD NECK THYROID: ICD-10-PCS | Mod: 26,,, | Performed by: RADIOLOGY

## 2023-12-01 PROCEDURE — 76536 US EXAM OF HEAD AND NECK: CPT | Mod: 26,,, | Performed by: RADIOLOGY

## 2023-12-04 ENCOUNTER — PATIENT MESSAGE (OUTPATIENT)
Dept: FAMILY MEDICINE | Facility: CLINIC | Age: 58
End: 2023-12-04
Payer: COMMERCIAL

## 2023-12-07 ENCOUNTER — PATIENT MESSAGE (OUTPATIENT)
Dept: RADIOLOGY | Facility: HOSPITAL | Age: 58
End: 2023-12-07
Payer: COMMERCIAL

## 2023-12-11 ENCOUNTER — HOSPITAL ENCOUNTER (OUTPATIENT)
Dept: RADIOLOGY | Facility: HOSPITAL | Age: 58
Discharge: HOME | End: 2023-12-11
Attending: INTERNAL MEDICINE
Payer: COMMERCIAL

## 2023-12-11 ENCOUNTER — CLINICAL SUPPORT (OUTPATIENT)
Dept: CARDIOLOGY | Facility: HOSPITAL | Age: 58
End: 2023-12-11
Attending: INTERNAL MEDICINE
Payer: COMMERCIAL

## 2023-12-11 VITALS — BODY MASS INDEX: 34.55 KG/M2 | WEIGHT: 195 LBS | HEIGHT: 63 IN

## 2023-12-11 DIAGNOSIS — R07.9 CHEST PAIN, UNSPECIFIED TYPE: ICD-10-CM

## 2023-12-11 PROCEDURE — 78452 HT MUSCLE IMAGE SPECT MULT: CPT | Mod: PO

## 2023-12-11 PROCEDURE — 78452 NUCLEAR STRESS - CARDIOLOGY INTERPRETED (CUPID ONLY): ICD-10-PCS | Mod: 26,,, | Performed by: INTERNAL MEDICINE

## 2023-12-11 PROCEDURE — 93016 CV STRESS TEST SUPVJ ONLY: CPT | Mod: ,,, | Performed by: INTERNAL MEDICINE

## 2023-12-11 PROCEDURE — 78452 HT MUSCLE IMAGE SPECT MULT: CPT | Mod: 26,,, | Performed by: INTERNAL MEDICINE

## 2023-12-11 PROCEDURE — 93017 CV STRESS TEST TRACING ONLY: CPT | Mod: PO

## 2023-12-11 PROCEDURE — 63600175 PHARM REV CODE 636 W HCPCS: Mod: PO | Performed by: INTERNAL MEDICINE

## 2023-12-11 PROCEDURE — 93016 NUCLEAR STRESS - CARDIOLOGY INTERPRETED (CUPID ONLY): ICD-10-PCS | Mod: ,,, | Performed by: INTERNAL MEDICINE

## 2023-12-11 PROCEDURE — 93018 PR CARDIAC STRESS TST,INTERP/REPT ONLY: ICD-10-PCS | Mod: ,,, | Performed by: INTERNAL MEDICINE

## 2023-12-11 PROCEDURE — 93018 CV STRESS TEST I&R ONLY: CPT | Mod: ,,, | Performed by: INTERNAL MEDICINE

## 2023-12-11 RX ORDER — REGADENOSON 0.08 MG/ML
0.4 INJECTION, SOLUTION INTRAVENOUS
Status: COMPLETED | OUTPATIENT
Start: 2023-12-11 | End: 2023-12-11

## 2023-12-11 RX ADMIN — REGADENOSON 0.4 MG: 0.08 INJECTION, SOLUTION INTRAVENOUS at 02:12

## 2023-12-12 LAB
CV PHARM DOSE: 0.4 MG
CV STRESS BASE HR: 77 BPM
DIASTOLIC BLOOD PRESSURE: 102 MMHG
NUC STRESS EJECTION FRACTION: 70 %
OHS CV CPX 1 MINUTE RECOVERY HEART RATE: 93 BPM
OHS CV CPX 85 PERCENT MAX PREDICTED HEART RATE MALE: 138
OHS CV CPX MAX PREDICTED HEART RATE: 162
OHS CV CPX PATIENT IS FEMALE: 1
OHS CV CPX PATIENT IS MALE: 0
OHS CV CPX PEAK DIASTOLIC BLOOD PRESSURE: 102 MMHG
OHS CV CPX PEAK HEAR RATE: 93 BPM
OHS CV CPX PEAK RATE PRESSURE PRODUCT: NORMAL
OHS CV CPX PEAK SYSTOLIC BLOOD PRESSURE: 158 MMHG
OHS CV CPX PERCENT MAX PREDICTED HEART RATE ACHIEVED: 60
OHS CV CPX RATE PRESSURE PRODUCT PRESENTING: NORMAL
OHS CV PHARM TIME: 1444 MIN
SYSTOLIC BLOOD PRESSURE: 158 MMHG

## 2023-12-17 ENCOUNTER — PATIENT MESSAGE (OUTPATIENT)
Dept: FAMILY MEDICINE | Facility: CLINIC | Age: 58
End: 2023-12-17
Payer: COMMERCIAL

## 2024-01-02 ENCOUNTER — OFFICE VISIT (OUTPATIENT)
Dept: NEUROLOGY | Facility: CLINIC | Age: 59
End: 2024-01-02
Payer: COMMERCIAL

## 2024-01-02 DIAGNOSIS — G43.109 MIGRAINE WITH AURA AND WITHOUT STATUS MIGRAINOSUS, NOT INTRACTABLE: Primary | ICD-10-CM

## 2024-01-02 PROCEDURE — 1159F MED LIST DOCD IN RCRD: CPT | Mod: CPTII,95,, | Performed by: PHYSICIAN ASSISTANT

## 2024-01-02 PROCEDURE — 99214 OFFICE O/P EST MOD 30 MIN: CPT | Mod: 95,,, | Performed by: PHYSICIAN ASSISTANT

## 2024-01-02 PROCEDURE — 1160F RVW MEDS BY RX/DR IN RCRD: CPT | Mod: CPTII,95,, | Performed by: PHYSICIAN ASSISTANT

## 2024-01-02 RX ORDER — SUMATRIPTAN SUCCINATE 100 MG/1
TABLET ORAL
Qty: 18 TABLET | Refills: 5 | Status: SHIPPED | OUTPATIENT
Start: 2024-01-02

## 2024-01-02 NOTE — PROGRESS NOTES
Established Patient     The patient location is: [place]  The chief complaint leading to consultation is: headache follow up visit    Visit type: audiovisual    Face to Face time with patient: 8 min  10 minutes of total time spent on the encounter, which includes face to face time and non-face to face time preparing to see the patient (eg, review of tests), Obtaining and/or reviewing separately obtained history, Documenting clinical information in the electronic or other health record, Independently interpreting results (not separately reported) and communicating results to the patient/family/caregiver, or Care coordination (not separately reported).     Each patient to whom he or she provides medical services by telemedicine is:  (1) informed of the relationship between the physician and patient and the respective role of any other health care provider with respect to management of the patient; and (2) notified that he or she may decline to receive medical services by telemedicine and may withdraw from such care at any time.    Notes:        SUBJECTIVE:  Patient ID: Delaney Noel   Chief Complaint: Headache    History of Present Illness:  Delaney Noel is a 58 y.o. female with migraine, depression, obesity, HLD, bipolar d/o, hypothyroidism, HTN, back pain, osteoarthritis, h/o asthma when smoking cigarettes but now resolved since smoking cessation  who presents via virtual visit alone for follow-up of headaches.       01/02/2024 - Interval History:  Pt's ha's have remained well controlled occurring 5/30 days per month. However, does notice 3 day long ha's. Would like imitrex as an abortive again. Toradol does help too. Does endorse waiting to take rescue at times. Discussed taking at onset.   Plan: continue emgality, may try imitrex vs toradol as abortive regimen, rtc 6-12 mo     Recommendations made at last Office Visit on 8/4/23:  - Discussed symptoms appear to be consistent with migraines. Discussed this with  patient along with treatment options and patient agreed with the following plan  - ppx - continue emgality, consider switching to ajovy or making further ppx adjustments if ha's worsen  - abortive - continue toradol tabs, discussed risks, importance of limiting and to avoid other nsaids while taking  - risks, benefits, and potential side effects of emgality, ajovy, toradol discussed   - alternative treatment options offered   - importance of healthy diet, regular exercise and sleep hygiene in the treatment of headaches    - Start tracking headaches via Migraine Buddy dhara on phone   - RTC in 3 mo      Treatments Tried:  Atenolol  Emgality - helps  Ajovy - helps, but insurance prefers emgality  Lexapro  Trokendi  Depakote   Effexor   Cymbalta   Gabapentin  Lamictal  Toradol - helps  Imitrex 100mg - helped   Nurtec - didn't help    Current Medications:    Current Outpatient Medications:     albuterol (PROVENTIL/VENTOLIN HFA) 90 mcg/actuation inhaler, Inhale 2 puffs into the lungs every 6 (six) hours as needed for Wheezing. Rescue, Disp: 18 g, Rfl: 2    aspirin (ECOTRIN) 81 MG EC tablet, Take 1 tablet (81 mg total) by mouth once daily., Disp: , Rfl: 0    atenoloL (TENORMIN) 50 MG tablet, Take 1 tablet (50 mg total) by mouth once daily., Disp: 90 tablet, Rfl: 3    cetirizine (ZYRTEC) 10 MG tablet, Take 10 mg by mouth once daily. Take one(1) tab by mouth daily, Disp: , Rfl:     clonazePAM (KLONOPIN) 1 MG tablet, Take 1 tablet (1 mg total) by mouth daily as needed for Anxiety., Disp: 30 tablet, Rfl: 5    dicyclomine (BENTYL) 10 MG capsule, TAKE 1 CAPSULE BY MOUTH BEFORE MEALS AND AT BEDTIME AS NEEDED (ABDOMINAL PAIN DIARRHEA)., Disp: 360 capsule, Rfl: 1    EScitalopram oxalate (LEXAPRO) 20 MG tablet, Take 1.5 tablets (30 mg total) by mouth once daily., Disp: 135 tablet, Rfl: 3    fluticasone propionate (FLONASE) 50 mcg/actuation nasal spray, 2 sprays (100 mcg total) by Each Nostril route once daily., Disp: 16 g, Rfl: 6     gabapentin (NEURONTIN) 300 MG capsule, gabapentin 300 mg capsule  TAKE 1 CAPSULE BY MOUTH EVERYDAY AT BEDTIME, Disp: 90 capsule, Rfl: 3    galcanezumab-gnlm 120 mg/mL PnIj, Inject 120 mg into the skin every 28 days., Disp: 1 mL, Rfl: 5    ibuprofen (ADVIL,MOTRIN) 800 MG tablet, Take 800 mg by mouth 3 (three) times daily as needed. , Disp: , Rfl:     ketorolac (TORADOL) 10 mg tablet, , Disp: , Rfl:     lamoTRIgine (LAMICTAL) 25 MG tablet, Take 3 tablets (75 mg total) by mouth once daily. Take three tabs(75mg) by mouth daily, Disp: 270 tablet, Rfl: 3    levothyroxine (SYNTHROID) 75 MCG tablet, Take 1 tablet (75 mcg total) by mouth before breakfast., Disp: 90 tablet, Rfl: 3    meloxicam (MOBIC) 15 MG tablet, Take 1 tablet (15 mg total) by mouth once daily., Disp: 90 tablet, Rfl: 1    methocarbamoL (ROBAXIN) 750 MG Tab, Take 1 tablet (750 mg total) by mouth nightly as needed., Disp: 90 tablet, Rfl: 2    QUEtiapine (SEROQUEL) 100 MG Tab, Take 1 tablet (100 mg total) by mouth once daily., Disp: 90 tablet, Rfl: 3    rosuvastatin (CRESTOR) 10 MG tablet, Take 1 tablet (10 mg total) by mouth once daily., Disp: 90 tablet, Rfl: 3    sumatriptan (IMITREX) 100 MG tablet, Take at onset of migraine, can repeat in 2 hrs if needed.  No more than twice per day or 3 days/wk., Disp: 18 tablet, Rfl: 5    Review of Systems - as per HPI, otherwise a balanced 10 systems review is negative.    OBJECTIVE:  Vitals:  There were no vitals taken for this visit.     Physical Exam:  Constitutional: she appears well-developed and well-nourished. she is well groomed. NAD   HENT:    Head: Normocephalic and atraumatic  Eyes: Conjunctivae and EOM are normal  Musculoskeletal: Normal range of motion. No joint stiffness.   Psychiatric: Mood and affect are normal    Neuro: Patient is alert and oriented to person, place, and time. Language is intact and fluent. Speech is clear and fluent. Recent and remote memory are intact.  Normal attention and  concentration.  Facial movement is symmetric. Moves all 4 extremities against gravity.      Review of Data:   Notes from neuro reviewed   Labs:  Hospital Outpatient Visit on 12/11/2023   Component Date Value Ref Range Status    85% Max Predicted HR 12/11/2023 138   Final    Max Predicted HR 12/11/2023 162   Final    [facility] CV CPX PATIENT IS MALE 12/11/2023 0.0   Final    [facility] CV CPX PATIENT IS FEMALE 12/11/2023 1.0   Final    HR at rest 12/11/2023 77  bpm Final    Systolic blood pressure 12/11/2023 158  mmHg Final    Diastolic blood pressure 12/11/2023 102  mmHg Final    RPP 12/11/2023 12,166   Final    Peak HR 12/11/2023 93.0  bpm Final    Peak Systolic BP 12/11/2023 158  mmHg Final    Peak Diatolic BP 12/11/2023 102  mmHg Final    Peak RPP 12/11/2023 14,694   Final    % Max HR Achieved 12/11/2023 60   Final    1 Minute Recovery HR 12/11/2023 93  bpm Final    dose 12/11/2023 0.4  mg Final    Pharm Time 12/11/2023 1,444  min Final    Nuc Stress EF 12/11/2023 70  % Final   Lab Visit on 11/22/2023   Component Date Value Ref Range Status    TTG IgA 11/22/2023 0.7  <7.0 U/mL Final    IgA 11/22/2023 101  40 - 350 mg/dL Final    WBC 11/22/2023 6.30  3.90 - 12.70 K/uL Final    RBC 11/22/2023 5.09  4.00 - 5.40 M/uL Final    Hemoglobin 11/22/2023 14.8  12.0 - 16.0 g/dL Final    Hematocrit 11/22/2023 45.4  37.0 - 48.5 % Final    MCV 11/22/2023 89  82 - 98 fL Final    MCH 11/22/2023 29.1  27.0 - 31.0 pg Final    MCHC 11/22/2023 32.6  32.0 - 36.0 g/dL Final    RDW 11/22/2023 12.7  11.5 - 14.5 % Final    Platelets 11/22/2023 210  150 - 450 K/uL Final    MPV 11/22/2023 11.8  9.2 - 12.9 fL Final    Immature Granulocytes 11/22/2023 0.3  0.0 - 0.5 % Final    Gran # (ANC) 11/22/2023 4.5  1.8 - 7.7 K/uL Final    Immature Grans (Abs) 11/22/2023 0.02  0.00 - 0.04 K/uL Final    Lymph # 11/22/2023 1.0  1.0 - 4.8 K/uL Final    Mono # 11/22/2023 0.5  0.3 - 1.0 K/uL Final    Eos # 11/22/2023 0.3  0.0 - 0.5 K/uL Final    Baso # 11/22/2023 0.04   0.00 - 0.20 K/uL Final    nRBC 11/22/2023 0  0 /100 WBC Final    Gran % 11/22/2023 70.7  38.0 - 73.0 % Final    Lymph % 11/22/2023 15.7 (L)  18.0 - 48.0 % Final    Mono % 11/22/2023 8.6  4.0 - 15.0 % Final    Eosinophil % 11/22/2023 4.1  0.0 - 8.0 % Final    Basophil % 11/22/2023 0.6  0.0 - 1.9 % Final    Differential Method 11/22/2023 Automated   Final    Sodium 11/22/2023 142  136 - 145 mmol/L Final    Potassium 11/22/2023 4.2  3.5 - 5.1 mmol/L Final    Chloride 11/22/2023 105  95 - 110 mmol/L Final    CO2 11/22/2023 28  23 - 29 mmol/L Final    Glucose 11/22/2023 111 (H)  70 - 110 mg/dL Final    BUN 11/22/2023 17  6 - 20 mg/dL Final    Creatinine 11/22/2023 1.0  0.5 - 1.4 mg/dL Final    Calcium 11/22/2023 9.7  8.7 - 10.5 mg/dL Final    Total Protein 11/22/2023 6.8  6.0 - 8.4 g/dL Final    Albumin 11/22/2023 3.9  3.5 - 5.2 g/dL Final    Total Bilirubin 11/22/2023 0.9  0.1 - 1.0 mg/dL Final    Alkaline Phosphatase 11/22/2023 80  55 - 135 U/L Final    AST 11/22/2023 22  10 - 40 U/L Final    ALT 11/22/2023 21  10 - 44 U/L Final    eGFR 11/22/2023 >60.0  >60 mL/min/1.73 m^2 Final    Anion Gap 11/22/2023 9  8 - 16 mmol/L Final    Hemoglobin A1C 11/22/2023 5.1  4.0 - 5.6 % Final    Estimated Avg Glucose 11/22/2023 100  68 - 131 mg/dL Final    Cholesterol 11/22/2023 139  120 - 199 mg/dL Final    Triglycerides 11/22/2023 199 (H)  30 - 150 mg/dL Final    HDL 11/22/2023 33 (L)  40 - 75 mg/dL Final    LDL Cholesterol 11/22/2023 66.2  63.0 - 159.0 mg/dL Final    HDL/Cholesterol Ratio 11/22/2023 23.7  20.0 - 50.0 % Final    Total Cholesterol/HDL Ratio 11/22/2023 4.2  2.0 - 5.0 Final    Non-HDL Cholesterol 11/22/2023 106  mg/dL Final    TSH 11/22/2023 4.677 (H)  0.400 - 4.000 uIU/mL Final    Free T4 11/22/2023 0.67 (L)  0.71 - 1.51 ng/dL Final     Imaging:  No results found for this or any previous visit.  Note: I have independently reviewed any/all imaging/labs/tests and agree with the report (s) as documented.  Any  discrepancies will be as noted/demarcated by free text.  CAROL LIMON 1/2/2024    ASSESSMENT:  1. Migraine with aura and without status migrainosus, not intractable        PLAN:  - Discussed symptoms appear to be consistent with migraines. Discussed this with patient along with treatment options and patient agreed with the following plan  - ppx - continue emgality, consider switching to ajovy or making further ppx adjustments if ha's worsen  - abortive - continue toradol tabs vs imitrex, discussed risks, importance of limiting and to avoid other nsaids while taking  - Continue tracking headaches   - Discussed goals of therapy are to decrease the frequency, intensity, and duration of headaches  - RTC in 6-12 mo     Orders Placed This Encounter    sumatriptan (IMITREX) 100 MG tablet       Discussed potential for teratogenicity with treatment, patient understands if her family planning status should change she will contact office immediately and we will safely adjust medications as needed.     Questions and concerns were sought and answered to the patient's stated verbal satisfaction.  The patient verbalizes understanding and agreement with the above stated treatment plan.     CC: Yesi Swartz DO Sarena Patel, PA-C  [facility] [place]   [phone]    Dr. Negron was available during today's encounter.

## 2024-01-10 ENCOUNTER — LAB VISIT (OUTPATIENT)
Dept: LAB | Facility: HOSPITAL | Age: 59
End: 2024-01-10
Attending: INTERNAL MEDICINE
Payer: COMMERCIAL

## 2024-01-10 DIAGNOSIS — E03.9 HYPOTHYROIDISM, UNSPECIFIED TYPE: ICD-10-CM

## 2024-01-10 LAB — TSH SERPL DL<=0.005 MIU/L-ACNC: 3.22 UIU/ML (ref 0.4–4)

## 2024-01-10 PROCEDURE — 36415 COLL VENOUS BLD VENIPUNCTURE: CPT | Mod: PO | Performed by: INTERNAL MEDICINE

## 2024-01-10 PROCEDURE — 84443 ASSAY THYROID STIM HORMONE: CPT | Performed by: INTERNAL MEDICINE

## 2024-01-11 ENCOUNTER — PATIENT MESSAGE (OUTPATIENT)
Dept: FAMILY MEDICINE | Facility: CLINIC | Age: 59
End: 2024-01-11
Payer: COMMERCIAL

## 2024-03-18 ENCOUNTER — HOSPITAL ENCOUNTER (OUTPATIENT)
Dept: RADIOLOGY | Facility: HOSPITAL | Age: 59
Discharge: HOME | End: 2024-03-18
Attending: INTERNAL MEDICINE
Payer: COMMERCIAL

## 2024-03-18 DIAGNOSIS — Z12.31 ENCOUNTER FOR SCREENING MAMMOGRAM FOR MALIGNANT NEOPLASM OF BREAST: ICD-10-CM

## 2024-03-18 DIAGNOSIS — Z78.0 ASYMPTOMATIC MENOPAUSAL STATE: ICD-10-CM

## 2024-03-18 PROCEDURE — 77067 SCR MAMMO BI INCL CAD: CPT | Mod: 26,,, | Performed by: RADIOLOGY

## 2024-03-18 PROCEDURE — 77080 DXA BONE DENSITY AXIAL: CPT | Mod: 26,,, | Performed by: RADIOLOGY

## 2024-03-18 PROCEDURE — 77063 BREAST TOMOSYNTHESIS BI: CPT | Mod: 26,,, | Performed by: RADIOLOGY

## 2024-03-18 PROCEDURE — 77067 SCR MAMMO BI INCL CAD: CPT | Mod: TC,PO

## 2024-03-18 PROCEDURE — 77080 DXA BONE DENSITY AXIAL: CPT | Mod: TC,PO

## 2024-03-20 ENCOUNTER — OFFICE VISIT (OUTPATIENT)
Dept: OPTOMETRY | Facility: CLINIC | Age: 59
End: 2024-03-20
Payer: COMMERCIAL

## 2024-03-20 DIAGNOSIS — H52.4 MYOPIA WITH ASTIGMATISM AND PRESBYOPIA, BILATERAL: ICD-10-CM

## 2024-03-20 DIAGNOSIS — H52.13 MYOPIA WITH ASTIGMATISM AND PRESBYOPIA, BILATERAL: ICD-10-CM

## 2024-03-20 DIAGNOSIS — H25.13 NUCLEAR SCLEROSIS, BILATERAL: ICD-10-CM

## 2024-03-20 DIAGNOSIS — H52.203 MYOPIA WITH ASTIGMATISM AND PRESBYOPIA, BILATERAL: ICD-10-CM

## 2024-03-20 DIAGNOSIS — H43.393 VITREOUS FLOATERS, BILATERAL: Primary | ICD-10-CM

## 2024-03-20 DIAGNOSIS — Z13.5 GLAUCOMA SCREENING: ICD-10-CM

## 2024-03-20 PROCEDURE — 92014 COMPRE OPH EXAM EST PT 1/>: CPT | Mod: S$GLB,,, | Performed by: OPTOMETRIST

## 2024-03-20 PROCEDURE — 1159F MED LIST DOCD IN RCRD: CPT | Mod: CPTII,S$GLB,, | Performed by: OPTOMETRIST

## 2024-03-20 PROCEDURE — 99999 PR PBB SHADOW E&M-EST. PATIENT-LVL III: CPT | Mod: PBBFAC,,, | Performed by: OPTOMETRIST

## 2024-03-20 PROCEDURE — 92015 DETERMINE REFRACTIVE STATE: CPT | Mod: S$GLB,,, | Performed by: OPTOMETRIST

## 2024-03-20 NOTE — PATIENT INSTRUCTIONS
"DRY EYES -- BURNING OR DARIN SYMPTOMS:  Use Over The Counter artificial tears as needed for dry eye symptoms.   Some common brands include:  Systane, Optive, Refresh, and Thera-Tears.  These drops can be used as frequently as desired, but may be most helpful use during long periods of concentrated work.  For example, reading / working at the computer. Start with 3-4x per day.     Nighttime Ophthalmic gel or ointments are available: Refresh PM, Genteal, and Lacrilube.    Avoid drops that "get redness out" (Visine, Murine, Clear Eyes), as these may contain medication that could further irritate the eyes, especially with chronic use.    ALLERGY EYES -- ITCHING SYMPTOMS:  Over the counter medications include--Pataday, Zaditor, and Alaway.  Use as directed 1-2 drops daily for symptoms of itching / watering eyes.  These drops will not help for dry eye or exposure symptoms.    REDNESS RELIEF:  Lumify---is a good redness reliever that will not cause irritation if used chronically.       FLASHES / FLOATERS / POSTERIOR VITREOUS DETACHMENT    Call the clinic if you have any further changes in symptoms.  Including:  Increased numbers of floaters or flashing lights, dimness or darkness that moves through or stays constant in your vision, or any pain in the eye (s).    You may sometimes see small specks or clouds moving in your field of vision.  They are called FLOATERS.  You can often see them when looking at a plain background, like a blank wall or blue marilyn.  Floaters are actually tiny clumps of gel or cells inside the VITREOUS, the clear jelly-like fluid that fills the inside of your eye.    While these objects look like they are in front of your eye, they are actually floating inside.  What you see are the shadows they cast on the RETINA, the nerve layer at the back of the eye that senses light and allows you to see.      POSTERIOR VITREOUS DETACHMENT    The appearance of new floaters may be alarming.  If you suddenly develop " new floaters, you should contact your eye care professional  right away.    The retina can tear if the shrinking vitreous pulls away from the wall of the eye.  This sometimes causes a small amount of bleeding in the eye that may appear as new floaters.    A torn retina is always a serious problem, since it can lead to a retinal detachment.  You should see your eye care professional as soon as possible if:    even one new floater appears suddenly;  you see sudden flashes of light;  you notice other symptoms, like the loss of side vision, or a curtain closes down in your vision        POSTERIOR VITREOUS DETACHMENT is more common for people who:    are nearsighted;  have had cataract surgery;  have had YAG laser surgery of the eye;  have had inflammation inside the eye;  are over age 60.      While some floaters may remain visible, many of them will fade over time and become less noticeable.  Even if you've had some floaters for years, you should have your eyes checked as soon as possible if you notice new ones.    FLASHING LIGHTS    When the vitreous gel rubs or pulls on the retina, you may see what look like flashing lights or lightning streaks.  These flashes can appear off and on for several weeks or months.      Some people experience flashes of light that appear as jagged lines or heat waves in both eyes, lasting 10-20 minutes.  These flashes are caused by a spasm of blood vessels in the brain, which is called a migraine.    If a headache follows these flashes, it's called a migraine headache.  If   no headache occurs, these flashes are called Ophthalmic or Ocular Migraine.          Early Cataracts--not visually significant for surgery consultation.    What Are Cataracts?  A clear lens in the eye focuses light. This lets the eye see images sharply. With age, the lens slowly becomes cloudy. The cloudy lens is a cataract. A cataract scatters light and makes it hard for the eye to focus. Cataracts often form in  both eyes. But one lens may cloud faster than the other.      The Aging of Your Lens    Your lens may cloud so slowly that you don`t notice any vision changes at first. But as the cataract gets worse, the eye has a harder time focusing. In early stages, glasses may help you see better. As the lens gets cloudier, your doctor may recommend surgery to restore your vision.

## 2024-03-20 NOTE — PROGRESS NOTES
HPI     Concerns About Ocular Health     Additional comments: Ocular health exam           Comments    DLS: 7/7/21    Pt states can't see well with present gls. + eyes itch constantly OU. Uses   systane and allergies PRN OU. No floaters or flashes.           Last edited by Quintana, Cheryle on 3/20/2024  9:42 AM.            Assessment /Plan     For exam results, see Encounter Report.    Vitreous floaters, bilateral    Nuclear sclerosis, bilateral    Glaucoma screening    Myopia with astigmatism and presbyopia, bilateral      RD precautions given and reviewed. Patient knows to call/ message if any further changes in symptoms occur.  Early NS changes, not ready for consult ---gave info cautions at night   Not suspect   Updated specs rx, gave copy w/ slight changes     Discussed and educated patient on current findings /plan.  RTC 1 year, prn if any changes / issues

## 2024-03-23 NOTE — TELEPHONE ENCOUNTER
No care due was identified.  [facility] Embedded Care Due Messages. Reference number: [id number].   3/23/2024 11:29:48 AM CDT

## 2024-03-27 RX ORDER — CLONAZEPAM 1 MG/1
1 TABLET ORAL DAILY PRN
Qty: 30 TABLET | Refills: 5 | Status: SHIPPED | OUTPATIENT
Start: 2024-03-27 | End: 2024-05-30 | Stop reason: SDUPTHER

## 2024-05-12 ENCOUNTER — PATIENT MESSAGE (OUTPATIENT)
Dept: FAMILY MEDICINE | Facility: CLINIC | Age: 59
End: 2024-05-12
Payer: COMMERCIAL

## 2024-05-12 NOTE — TELEPHONE ENCOUNTER
No care due was identified.  [facility] Embedded Care Due Messages. Reference number: [id number].   5/12/2024 2:59:31 PM CDT

## 2024-05-13 RX ORDER — GABAPENTIN 300 MG/1
CAPSULE ORAL
Qty: 90 CAPSULE | Refills: 3 | Status: SHIPPED | OUTPATIENT
Start: 2024-05-13 | End: 2024-05-14 | Stop reason: SDUPTHER

## 2024-05-14 NOTE — TELEPHONE ENCOUNTER
No care due was identified.  [facility] Embedded Care Due Messages. Reference number: [id number].   5/14/2024 10:12:23 AM CDT

## 2024-05-15 RX ORDER — GABAPENTIN 300 MG/1
CAPSULE ORAL
Qty: 90 CAPSULE | Refills: 3 | Status: SHIPPED | OUTPATIENT
Start: 2024-05-15 | End: 2024-05-15 | Stop reason: SDUPTHER

## 2024-05-15 RX ORDER — GABAPENTIN 300 MG/1
300 CAPSULE ORAL NIGHTLY
Qty: 90 CAPSULE | Refills: 3 | Status: SHIPPED | OUTPATIENT
Start: 2024-05-15

## 2024-05-16 ENCOUNTER — TELEPHONE (OUTPATIENT)
Dept: PAIN MEDICINE | Facility: CLINIC | Age: 59
End: 2024-05-16

## 2024-05-16 ENCOUNTER — OFFICE VISIT (OUTPATIENT)
Dept: PAIN MEDICINE | Facility: CLINIC | Age: 59
End: 2024-05-16
Payer: COMMERCIAL

## 2024-05-16 VITALS
HEART RATE: 67 BPM | BODY MASS INDEX: 35.16 KG/M2 | SYSTOLIC BLOOD PRESSURE: 139 MMHG | DIASTOLIC BLOOD PRESSURE: 94 MMHG | WEIGHT: 198.44 LBS | HEIGHT: 63 IN

## 2024-05-16 DIAGNOSIS — M54.41 CHRONIC BILATERAL LOW BACK PAIN WITH RIGHT-SIDED SCIATICA: ICD-10-CM

## 2024-05-16 DIAGNOSIS — M54.51 VERTEBROGENIC LOW BACK PAIN: ICD-10-CM

## 2024-05-16 DIAGNOSIS — M54.16 LUMBAR RADICULOPATHY: Primary | ICD-10-CM

## 2024-05-16 DIAGNOSIS — M47.816 LUMBAR SPONDYLOSIS: ICD-10-CM

## 2024-05-16 DIAGNOSIS — G89.29 CHRONIC BILATERAL LOW BACK PAIN WITH RIGHT-SIDED SCIATICA: ICD-10-CM

## 2024-05-16 PROCEDURE — 3080F DIAST BP >= 90 MM HG: CPT | Mod: CPTII,S$GLB,,

## 2024-05-16 PROCEDURE — 1159F MED LIST DOCD IN RCRD: CPT | Mod: CPTII,S$GLB,,

## 2024-05-16 PROCEDURE — 3008F BODY MASS INDEX DOCD: CPT | Mod: CPTII,S$GLB,,

## 2024-05-16 PROCEDURE — 99999 PR PBB SHADOW E&M-EST. PATIENT-LVL IV: CPT | Mod: PBBFAC,,,

## 2024-05-16 PROCEDURE — 3075F SYST BP GE 130 - 139MM HG: CPT | Mod: CPTII,S$GLB,,

## 2024-05-16 PROCEDURE — 99214 OFFICE O/P EST MOD 30 MIN: CPT | Mod: S$GLB,,,

## 2024-05-16 RX ORDER — ALPRAZOLAM 1 MG/1
1 TABLET, ORALLY DISINTEGRATING ORAL ONCE AS NEEDED
Status: CANCELLED | OUTPATIENT
Start: 2024-05-16 | End: 2035-10-13

## 2024-05-16 NOTE — H&P (VIEW-ONLY)
[facility] Follow Up Evaluation      Referred by: No ref. provider found    PCP: Dr. Swartz    CC:   Chief Complaint   Patient presents with    Low-back Pain     Scitacia          5/16/2024    11:16 AM   Last 3 PDI Scores   Pain Disability Index (PDI) 28     Interval HPI 5/16/2024: Delaney Noel returns to the office for follow up.  Today she is reporting return of her lower back pain with radiation down right leg into right foot, 5-8/10, worsened with physical activities, only slightly improved with rest.  She reports associated numbness and tingling in bilateral legs.  She denies any weakness or any new changes to her bowel or bladder function.  She has not finding relief with gabapentin, Mobic and Robaxin.      HPI:   Delaney Noel is a 59 y.o. female who presents with back pain.  She is had chronic lower back pain for many years it has been gradually worsening.  Today her pain is 8/10, constant, aching, sharp with radiating pain down the back of the right leg stopping at the knee.  She denies any numbness or weakness.      Pain Intervention History:  - s/p L5/S1 ANUJA on 11/23/2022 with initially 100% relief and now 30% relief.  - s/p L5/S1 ANUJA on 07/25/2023 with 85% relief.    Past Spine Surgical History:      Past and current medications:  Antineuropathics:  NSAIDs: mobic, ibuprofen, toradol  Physical therapy: yes, currently   Antidepressants: gabapentin  Muscle relaxers: robaxin  Opioids:  Antiplatelets/Anticoagulants: aspirin    History:    Current Outpatient Medications:     albuterol (PROVENTIL/VENTOLIN HFA) 90 mcg/actuation inhaler, Inhale 2 puffs into the lungs every 6 (six) hours as needed for Wheezing. Rescue, Disp: 18 g, Rfl: 2    atenoloL (TENORMIN) 50 MG tablet, Take 1 tablet (50 mg total) by mouth once daily., Disp: 90 tablet, Rfl: 3    cetirizine (ZYRTEC) 10 MG tablet, Take 10 mg by mouth once daily. Take one(1) tab by mouth daily, Disp: , Rfl:     clonazePAM (KLONOPIN) 1 MG  tablet, Take 1 tablet (1 mg total) by mouth daily as needed for Anxiety., Disp: 30 tablet, Rfl: 5    dicyclomine (BENTYL) 10 MG capsule, TAKE 1 CAPSULE BY MOUTH BEFORE MEALS AND AT BEDTIME AS NEEDED (ABDOMINAL PAIN DIARRHEA)., Disp: 360 capsule, Rfl: 1    EScitalopram oxalate (LEXAPRO) 20 MG tablet, Take 1.5 tablets (30 mg total) by mouth once daily., Disp: 135 tablet, Rfl: 3    fluticasone propionate (FLONASE) 50 mcg/actuation nasal spray, 2 sprays (100 mcg total) by Each Nostril route once daily., Disp: 16 g, Rfl: 6    gabapentin (NEURONTIN) 300 MG capsule, Take 1 capsule (300 mg total) by mouth every evening., Disp: 90 capsule, Rfl: 3    ibuprofen (ADVIL,MOTRIN) 800 MG tablet, Take 800 mg by mouth 3 (three) times daily as needed. , Disp: , Rfl:     ketorolac (TORADOL) 10 mg tablet, , Disp: , Rfl:     lamoTRIgine (LAMICTAL) 25 MG tablet, Take 3 tablets (75 mg total) by mouth once daily. Take three tabs(75mg) by mouth daily, Disp: 270 tablet, Rfl: 3    levothyroxine (SYNTHROID) 75 MCG tablet, Take 1 tablet (75 mcg total) by mouth before breakfast., Disp: 90 tablet, Rfl: 3    meloxicam (MOBIC) 15 MG tablet, Take 1 tablet (15 mg total) by mouth once daily., Disp: 90 tablet, Rfl: 1    methocarbamoL (ROBAXIN) 750 MG Tab, Take 1 tablet (750 mg total) by mouth nightly as needed., Disp: 90 tablet, Rfl: 2    QUEtiapine (SEROQUEL) 100 MG Tab, Take 1 tablet (100 mg total) by mouth once daily., Disp: 90 tablet, Rfl: 3    rosuvastatin (CRESTOR) 10 MG tablet, Take 1 tablet (10 mg total) by mouth once daily., Disp: 90 tablet, Rfl: 3    sumatriptan (IMITREX) 100 MG tablet, Take at onset of migraine, can repeat in 2 hrs if needed.  No more than twice per day or 3 days/wk., Disp: 18 tablet, Rfl: 5    aspirin (ECOTRIN) 81 MG EC tablet, Take 1 tablet (81 mg total) by mouth once daily., Disp: , Rfl: 0    Past Medical History:   Diagnosis Date    Allergy     Depression     Hx of colonic polyps 08/04/2014    per colonoscopy report     Hypertension     Migraine     Suicidal thoughts 2019    [facility] tx     Thyroid disease     Hypothyroid, [facility]       Past Surgical History:   Procedure Laterality Date    COLONOSCOPY      COLONOSCOPY N/A 2020    Procedure: COLONOSCOPY;  Surgeon: Thiago Lozoya Jr., MD;  Location: [facility] ENDO;  Service: Endoscopy;  Laterality: N/A;    cystoscope      EPIDURAL STEROID INJECTION INTO LUMBAR SPINE N/A 2022    Procedure: Injection-steroid-epidural-lumbar L5/S1;  Surgeon: Chandler Pereira MD;  Location: [facility] OR;  Service: Pain Management;  Laterality: N/A;    EPIDURAL STEROID INJECTION INTO LUMBAR SPINE N/A 2023    Procedure: Injection-steroid-epidural-lumbar L5/S1;  Surgeon: Chandler Pereira MD;  Location: [facility] OR;  Service: Pain Management;  Laterality: N/A;    HYSTERECTOMY      JAMIE with BSO- age 42    OOPHORECTOMY         Family History   Problem Relation Name Age of Onset    Heart disease Mother          had childhood rheumatic fever    Cancer Mother          uterine cancer    Cervical cancer Mother  28    Atrial fibrillation Father      Macular degeneration Maternal Grandmother      Cancer Maternal Grandmother          uterine cancer    Stroke Maternal Grandmother      Heart disease Maternal Grandmother  50        MI    Stroke Maternal Grandfather      Nephrolithiasis Neg Hx      Glaucoma Neg Hx         Social History     Socioeconomic History    Marital status:     Number of children: 2   Tobacco Use    Smoking status: Former     Current packs/day: 0.00     Average packs/day: 0.3 packs/day for 25.0 years (6.3 ttl pk-yrs)     Types: Cigarettes     Start date: 1990     Quit date: 2015     Years since quittin.9    Smokeless tobacco: Never    Tobacco comments:     had quit x 2   Substance and Sexual Activity    Alcohol use: Yes     Alcohol/week: 2.0 standard drinks of alcohol     Types: 2 Cans of beer per week     Comment: once a week     Drug use: No    Sexual activity: Yes      "Partners: Male     Birth control/protection: None, See Surgical Hx     Social Determinants of Health     Financial Resource Strain: Low Risk  (11/28/2023)    Overall Financial Resource Strain (CARDIA)     Difficulty of Paying Living Expenses: Not hard at all   Food Insecurity: No Food Insecurity (11/28/2023)    Hunger Vital Sign     Worried About Running Out of Food in the Last Year: Never true     Ran Out of Food in the Last Year: Never true   Transportation Needs: No Transportation Needs (11/28/2023)    PRAPARE - Transportation     Lack of Transportation (Medical): No     Lack of Transportation (Non-Medical): No   Physical Activity: Inactive (11/28/2023)    Exercise Vital Sign     Days of Exercise per Week: 0 days     Minutes of Exercise per Session: 0 min   Stress: Stress Concern Present (11/28/2023)    [employer] - Occupational Stress Questionnaire     Feeling of Stress : Rather much   Housing Stability: Low Risk  (11/28/2023)    Housing Stability Vital Sign     Unable to Pay for Housing in the Last Year: No     Number of Places Lived in the Last Year: 1     Unstable Housing in the Last Year: No       Review of patient's allergies indicates:   Allergen Reactions    Hay fever and allergy relief      Patient states environmental allergies; NKDA       Review of Systems:  Positive for joint pain. Balance  of review of systems is negative.    Physical Exam:  Vitals:    05/16/24 1118   BP: (!) 139/94   Pulse: 67   Weight: 90 kg (198 lb 6.6 oz)   Height: 5' 3" (1.6 m)   PainSc:   5   PainLoc: Back         Body mass index is 35.15 kg/m².    Gen: NAD  Psych: mood appropriate for given condition  HEENT: eyes anicteric   CV: RRR, 2+ radial pulse  HEENT: anicteric   Respiratory: non-labored, no signs of respiratory distress  Abd: non-distended  Skin: warm, dry and intact.  Gait:  No antalgic gait.      Sensory:   Intact and symmetrical to light touch in L1-S1 dermatomes bilaterally.   "   Motor:           Right Left   L2/3 Iliacus Hip flexion  5  5   L3/4 Qudratus Femoris Knee Extension  5  5   L4/5 Tib Anterior Ankle Dorsiflexion   5  5   L5/S1 Extensor Hallicus Longus Great toe extension  5  5   S1/S2 Gastroc/Soleus Plantar Flexion  5  5        Right Left   Triceps DTR       Biceps DTR       Brachioradialis DTR       Patellar DTR 2+ 2+   Achilles DTR 0+ 2+   Cuevas Absent  Absent                               Imaging:  MRI lumbar spine 11/8/22  FINDINGS:  Alignment: Mild levoconvex curvature of the lumbar spine.     Vertebral column: Vertebral body heights are maintained.  No evidence of an acute fracture or aggressive marrow replacement process. Multilevel disc degeneration, most pronounced at L5-S1 with marked disc space narrowing, degenerative endplate change and marginal osteophyte formation.  Stable probable hemangioma within the L3 vertebral body.     Cord: Normal.  Conus terminates at L1.     Degenerative findings:     T12-L1: No significant disc bulge.  Mild bilateral facet arthropathy.  No neural foraminal or spinal canal stenosis.  L1-L2: No significant disc abnormality.  Mild bilateral facet arthropathy.  There is no neural foraminal stenosis.  There is no spinal canal stenosis.  L2-L3: No significant disc abnormality.  Mild bilateral facet arthropathy.  There is no neural foraminal stenosis.  There is no spinal canal stenosis.  L3-L4: Minimal diffuse disc bulge.  Mild bilateral facet arthropathy and ligamentum flavum thickening.  Mild left neural foraminal stenosis.  There is no spinal canal stenosis.  L4-L5: Mild diffuse disc bulge with mild broad-based right extraforaminal disc protrusion through an annular fissure.  Moderate bilateral facet arthropathy.  Ligamentum flavum thickening.  Mild bilateral neural foraminal stenosis.  Mild spinal canal stenosis.  L5-S1: Marked disc space narrowing with osteophytic ridging and diffuse disc herniation.  Moderate bilateral facet arthropathy.   Ligamentum flavum thickening.  Moderate left and mild-to-moderate right neural foraminal stenosis.  Mild spinal canal stenosis.     Paraspinal muscles & soft tissues: No significant abnormality.    Labs:  Lab Results   Component Value Date    HGBA1C 5.1 11/22/2023       Lab Results   Component Value Date    WBC 6.30 11/22/2023    HGB 14.8 11/22/2023    HCT 45.4 11/22/2023    MCV 89 11/22/2023     11/22/2023           Assessment:   Problem List Items Addressed This Visit    None  Visit Diagnoses       Lumbar radiculopathy    -  Primary    Chronic bilateral low back pain with right-sided sciatica        Lumbar spondylosis        Vertebrogenic low back pain                        59 y.o. year old female with PMH bipolar, HTN, hypothyroidism who presents with back pain.  She is had chronic lower back pain for many years it has been gradually worsening.  Today her pain is 8/10, constant, aching, sharp with radiating pain down the back of the right leg stopping at the knee.  She denies any numbness or weakness.    5/16/2024: Delaney Noel returns to the office for follow up.  Today she is reporting return of her lower back pain with radiation down right leg into right foot, 5-8/10, worsened with physical activities, only slightly improved with rest.  She reports associated numbness and tingling in bilateral legs.  She denies any weakness or any new changes to her bowel or bladder function.  She has not finding relief with gabapentin, Mobic and Robaxin.    - on exam she is full strength in her lower extremities  - I independently reviewed her lumbar MRI is consistent with multilevel bilateral facet arthropathy worse at L4-5 and L5-S1 as well as diffuse central disc herniation at L5-S1 causing some mild central canal narrowing and bilateral foraminal narrowing.  Modic type 2 endplate changes at L5/S1.  - I believe her lumbar radicular pain has returned, this pain is limiting her mobility and interfering with her  ADLs and quality of life.  She has not finding significant relief with gabapentin, Mobic and Robaxin.  - pain is too severe to restart formal physical therapy.  - for her pain I would like to schedule her for repeat L5/S1 ANUJA.  Previous ANUJA gave her 85% relief lasting 9 months.  - follow-up 2 weeks post procedure or sooner if needed.  If she fails to get relief with this will update her lumbar MRI    : Not applicable    This note was completed with dictation software and grammatical errors may exist.

## 2024-05-16 NOTE — PROGRESS NOTES
[facility] Follow Up Evaluation      Referred by: No ref. provider found    PCP: Dr. Swartz    CC:   Chief Complaint   Patient presents with    Low-back Pain     Scitacia          5/16/2024    11:16 AM   Last 3 PDI Scores   Pain Disability Index (PDI) 28     Interval HPI 5/16/2024: Delaney Noel returns to the office for follow up.  Today she is reporting return of her lower back pain with radiation down right leg into right foot, 5-8/10, worsened with physical activities, only slightly improved with rest.  She reports associated numbness and tingling in bilateral legs.  She denies any weakness or any new changes to her bowel or bladder function.  She has not finding relief with gabapentin, Mobic and Robaxin.      HPI:   Delaney Noel is a 59 y.o. female who presents with back pain.  She is had chronic lower back pain for many years it has been gradually worsening.  Today her pain is 8/10, constant, aching, sharp with radiating pain down the back of the right leg stopping at the knee.  She denies any numbness or weakness.      Pain Intervention History:  - s/p L5/S1 ANUJA on 11/23/2022 with initially 100% relief and now 30% relief.  - s/p L5/S1 ANUJA on 07/25/2023 with 85% relief.    Past Spine Surgical History:      Past and current medications:  Antineuropathics:  NSAIDs: mobic, ibuprofen, toradol  Physical therapy: yes, currently   Antidepressants: gabapentin  Muscle relaxers: robaxin  Opioids:  Antiplatelets/Anticoagulants: aspirin    History:    Current Outpatient Medications:     albuterol (PROVENTIL/VENTOLIN HFA) 90 mcg/actuation inhaler, Inhale 2 puffs into the lungs every 6 (six) hours as needed for Wheezing. Rescue, Disp: 18 g, Rfl: 2    atenoloL (TENORMIN) 50 MG tablet, Take 1 tablet (50 mg total) by mouth once daily., Disp: 90 tablet, Rfl: 3    cetirizine (ZYRTEC) 10 MG tablet, Take 10 mg by mouth once daily. Take one(1) tab by mouth daily, Disp: , Rfl:     clonazePAM (KLONOPIN) 1 MG  tablet, Take 1 tablet (1 mg total) by mouth daily as needed for Anxiety., Disp: 30 tablet, Rfl: 5    dicyclomine (BENTYL) 10 MG capsule, TAKE 1 CAPSULE BY MOUTH BEFORE MEALS AND AT BEDTIME AS NEEDED (ABDOMINAL PAIN DIARRHEA)., Disp: 360 capsule, Rfl: 1    EScitalopram oxalate (LEXAPRO) 20 MG tablet, Take 1.5 tablets (30 mg total) by mouth once daily., Disp: 135 tablet, Rfl: 3    fluticasone propionate (FLONASE) 50 mcg/actuation nasal spray, 2 sprays (100 mcg total) by Each Nostril route once daily., Disp: 16 g, Rfl: 6    gabapentin (NEURONTIN) 300 MG capsule, Take 1 capsule (300 mg total) by mouth every evening., Disp: 90 capsule, Rfl: 3    ibuprofen (ADVIL,MOTRIN) 800 MG tablet, Take 800 mg by mouth 3 (three) times daily as needed. , Disp: , Rfl:     ketorolac (TORADOL) 10 mg tablet, , Disp: , Rfl:     lamoTRIgine (LAMICTAL) 25 MG tablet, Take 3 tablets (75 mg total) by mouth once daily. Take three tabs(75mg) by mouth daily, Disp: 270 tablet, Rfl: 3    levothyroxine (SYNTHROID) 75 MCG tablet, Take 1 tablet (75 mcg total) by mouth before breakfast., Disp: 90 tablet, Rfl: 3    meloxicam (MOBIC) 15 MG tablet, Take 1 tablet (15 mg total) by mouth once daily., Disp: 90 tablet, Rfl: 1    methocarbamoL (ROBAXIN) 750 MG Tab, Take 1 tablet (750 mg total) by mouth nightly as needed., Disp: 90 tablet, Rfl: 2    QUEtiapine (SEROQUEL) 100 MG Tab, Take 1 tablet (100 mg total) by mouth once daily., Disp: 90 tablet, Rfl: 3    rosuvastatin (CRESTOR) 10 MG tablet, Take 1 tablet (10 mg total) by mouth once daily., Disp: 90 tablet, Rfl: 3    sumatriptan (IMITREX) 100 MG tablet, Take at onset of migraine, can repeat in 2 hrs if needed.  No more than twice per day or 3 days/wk., Disp: 18 tablet, Rfl: 5    aspirin (ECOTRIN) 81 MG EC tablet, Take 1 tablet (81 mg total) by mouth once daily., Disp: , Rfl: 0    Past Medical History:   Diagnosis Date    Allergy     Depression     Hx of colonic polyps 08/04/2014    per colonoscopy report     Hypertension     Migraine     Suicidal thoughts 2019    [facility] tx     Thyroid disease     Hypothyroid, [facility]       Past Surgical History:   Procedure Laterality Date    COLONOSCOPY      COLONOSCOPY N/A 2020    Procedure: COLONOSCOPY;  Surgeon: Thiago Lozoya Jr., MD;  Location: [facility] ENDO;  Service: Endoscopy;  Laterality: N/A;    cystoscope      EPIDURAL STEROID INJECTION INTO LUMBAR SPINE N/A 2022    Procedure: Injection-steroid-epidural-lumbar L5/S1;  Surgeon: Chandler Pereira MD;  Location: [facility] OR;  Service: Pain Management;  Laterality: N/A;    EPIDURAL STEROID INJECTION INTO LUMBAR SPINE N/A 2023    Procedure: Injection-steroid-epidural-lumbar L5/S1;  Surgeon: Chandler Pereira MD;  Location: [facility] OR;  Service: Pain Management;  Laterality: N/A;    HYSTERECTOMY      JAMIE with BSO- age 42    OOPHORECTOMY         Family History   Problem Relation Name Age of Onset    Heart disease Mother          had childhood rheumatic fever    Cancer Mother          uterine cancer    Cervical cancer Mother  28    Atrial fibrillation Father      Macular degeneration Maternal Grandmother      Cancer Maternal Grandmother          uterine cancer    Stroke Maternal Grandmother      Heart disease Maternal Grandmother  50        MI    Stroke Maternal Grandfather      Nephrolithiasis Neg Hx      Glaucoma Neg Hx         Social History     Socioeconomic History    Marital status:     Number of children: 2   Tobacco Use    Smoking status: Former     Current packs/day: 0.00     Average packs/day: 0.3 packs/day for 25.0 years (6.3 ttl pk-yrs)     Types: Cigarettes     Start date: 1990     Quit date: 2015     Years since quittin.9    Smokeless tobacco: Never    Tobacco comments:     had quit x 2   Substance and Sexual Activity    Alcohol use: Yes     Alcohol/week: 2.0 standard drinks of alcohol     Types: 2 Cans of beer per week     Comment: once a week     Drug use: No    Sexual activity: Yes      "Partners: Male     Birth control/protection: None, See Surgical Hx     Social Determinants of Health     Financial Resource Strain: Low Risk  (11/28/2023)    Overall Financial Resource Strain (CARDIA)     Difficulty of Paying Living Expenses: Not hard at all   Food Insecurity: No Food Insecurity (11/28/2023)    Hunger Vital Sign     Worried About Running Out of Food in the Last Year: Never true     Ran Out of Food in the Last Year: Never true   Transportation Needs: No Transportation Needs (11/28/2023)    PRAPARE - Transportation     Lack of Transportation (Medical): No     Lack of Transportation (Non-Medical): No   Physical Activity: Inactive (11/28/2023)    Exercise Vital Sign     Days of Exercise per Week: 0 days     Minutes of Exercise per Session: 0 min   Stress: Stress Concern Present (11/28/2023)    [employer] [place] [employer] - Occupational Stress Questionnaire     Feeling of Stress : Rather much   Housing Stability: Low Risk  (11/28/2023)    Housing Stability Vital Sign     Unable to Pay for Housing in the Last Year: No     Number of Places Lived in the Last Year: 1     Unstable Housing in the Last Year: No       Review of patient's allergies indicates:   Allergen Reactions    Hay fever and allergy relief      Patient states environmental allergies; NKDA       Review of Systems:  Positive for joint pain. Balance  of review of systems is negative.    Physical Exam:  Vitals:    05/16/24 1118   BP: (!) 139/94   Pulse: 67   Weight: 90 kg (198 lb 6.6 oz)   Height: 5' 3" (1.6 m)   PainSc:   5   PainLoc: Back         Body mass index is 35.15 kg/m².    Gen: NAD  Psych: mood appropriate for given condition  HEENT: eyes anicteric   CV: RRR, 2+ radial pulse  HEENT: anicteric   Respiratory: non-labored, no signs of respiratory distress  Abd: non-distended  Skin: warm, dry and intact.  Gait:  No antalgic gait.      Sensory:   Intact and symmetrical to light touch in L1-S1 dermatomes bilaterally.   "   Motor:           Right Left   L2/3 Iliacus Hip flexion  5  5   L3/4 Qudratus Femoris Knee Extension  5  5   L4/5 Tib Anterior Ankle Dorsiflexion   5  5   L5/S1 Extensor Hallicus Longus Great toe extension  5  5   S1/S2 Gastroc/Soleus Plantar Flexion  5  5        Right Left   Triceps DTR       Biceps DTR       Brachioradialis DTR       Patellar DTR 2+ 2+   Achilles DTR 0+ 2+   Cuevas Absent  Absent                               Imaging:  MRI lumbar spine 11/8/22  FINDINGS:  Alignment: Mild levoconvex curvature of the lumbar spine.     Vertebral column: Vertebral body heights are maintained.  No evidence of an acute fracture or aggressive marrow replacement process. Multilevel disc degeneration, most pronounced at L5-S1 with marked disc space narrowing, degenerative endplate change and marginal osteophyte formation.  Stable probable hemangioma within the L3 vertebral body.     Cord: Normal.  Conus terminates at L1.     Degenerative findings:     T12-L1: No significant disc bulge.  Mild bilateral facet arthropathy.  No neural foraminal or spinal canal stenosis.  L1-L2: No significant disc abnormality.  Mild bilateral facet arthropathy.  There is no neural foraminal stenosis.  There is no spinal canal stenosis.  L2-L3: No significant disc abnormality.  Mild bilateral facet arthropathy.  There is no neural foraminal stenosis.  There is no spinal canal stenosis.  L3-L4: Minimal diffuse disc bulge.  Mild bilateral facet arthropathy and ligamentum flavum thickening.  Mild left neural foraminal stenosis.  There is no spinal canal stenosis.  L4-L5: Mild diffuse disc bulge with mild broad-based right extraforaminal disc protrusion through an annular fissure.  Moderate bilateral facet arthropathy.  Ligamentum flavum thickening.  Mild bilateral neural foraminal stenosis.  Mild spinal canal stenosis.  L5-S1: Marked disc space narrowing with osteophytic ridging and diffuse disc herniation.  Moderate bilateral facet arthropathy.   Ligamentum flavum thickening.  Moderate left and mild-to-moderate right neural foraminal stenosis.  Mild spinal canal stenosis.     Paraspinal muscles & soft tissues: No significant abnormality.    Labs:  Lab Results   Component Value Date    HGBA1C 5.1 11/22/2023       Lab Results   Component Value Date    WBC 6.30 11/22/2023    HGB 14.8 11/22/2023    HCT 45.4 11/22/2023    MCV 89 11/22/2023     11/22/2023           Assessment:   Problem List Items Addressed This Visit    None  Visit Diagnoses       Lumbar radiculopathy    -  Primary    Chronic bilateral low back pain with right-sided sciatica        Lumbar spondylosis        Vertebrogenic low back pain                        59 y.o. year old female with PMH bipolar, HTN, hypothyroidism who presents with back pain.  She is had chronic lower back pain for many years it has been gradually worsening.  Today her pain is 8/10, constant, aching, sharp with radiating pain down the back of the right leg stopping at the knee.  She denies any numbness or weakness.    5/16/2024: Delaney Noel returns to the office for follow up.  Today she is reporting return of her lower back pain with radiation down right leg into right foot, 5-8/10, worsened with physical activities, only slightly improved with rest.  She reports associated numbness and tingling in bilateral legs.  She denies any weakness or any new changes to her bowel or bladder function.  She has not finding relief with gabapentin, Mobic and Robaxin.    - on exam she is full strength in her lower extremities  - I independently reviewed her lumbar MRI is consistent with multilevel bilateral facet arthropathy worse at L4-5 and L5-S1 as well as diffuse central disc herniation at L5-S1 causing some mild central canal narrowing and bilateral foraminal narrowing.  Modic type 2 endplate changes at L5/S1.  - I believe her lumbar radicular pain has returned, this pain is limiting her mobility and interfering with her  ADLs and quality of life.  She has not finding significant relief with gabapentin, Mobic and Robaxin.  - pain is too severe to restart formal physical therapy.  - for her pain I would like to schedule her for repeat L5/S1 ANUJA.  Previous ANUJA gave her 85% relief lasting 9 months.  - follow-up 2 weeks post procedure or sooner if needed.  If she fails to get relief with this will update her lumbar MRI    : Not applicable    This note was completed with dictation software and grammatical errors may exist.

## 2024-05-16 NOTE — TELEPHONE ENCOUNTER
Please schedule patient for the following procedure:    Physician - Dr Pereira    Type of Procedure/Injection - Lumbar Epidural  L5/S1           Laterality - NA      Anxiolysis- Local      Need to hold medication - Yes      Aspirin for 7 days and NSAIDs for 2 days      Clearance needed - Yes      Follow up - 2 week

## 2024-05-17 ENCOUNTER — TELEPHONE (OUTPATIENT)
Dept: PAIN MEDICINE | Facility: CLINIC | Age: 59
End: 2024-05-17
Payer: COMMERCIAL

## 2024-05-17 NOTE — TELEPHONE ENCOUNTER
Hi! Patient is scheduled for a lumbar ANUJA on 6/4 with Dr. Pereira. Provider requests patient to hold ASA x 7 days prior to injection. Is patient cleared to hold ASA for scheduled epidural injection? Thanks

## 2024-05-17 NOTE — TELEPHONE ENCOUNTER
Spoke with patient and scheduled. Per provider patient to hold ASA x 7 and NSAIDS/Mobic x 2 days prior. Message for clearance sent to patient's PCP. Pre op information given and follow up appointment scheduled.

## 2024-05-23 ENCOUNTER — LAB VISIT (OUTPATIENT)
Dept: LAB | Facility: HOSPITAL | Age: 59
End: 2024-05-23
Attending: INTERNAL MEDICINE
Payer: COMMERCIAL

## 2024-05-23 DIAGNOSIS — I10 ESSENTIAL HYPERTENSION: ICD-10-CM

## 2024-05-23 LAB
ALBUMIN SERPL BCP-MCNC: 4 G/DL (ref 3.5–5.2)
ALP SERPL-CCNC: 78 U/L (ref 55–135)
ALT SERPL W/O P-5'-P-CCNC: 32 U/L (ref 10–44)
ANION GAP SERPL CALC-SCNC: 10 MMOL/L (ref 8–16)
AST SERPL-CCNC: 22 U/L (ref 10–40)
BASOPHILS # BLD AUTO: 0.03 K/UL (ref 0–0.2)
BASOPHILS NFR BLD: 0.5 % (ref 0–1.9)
BILIRUB SERPL-MCNC: 0.6 MG/DL (ref 0.1–1)
BUN SERPL-MCNC: 15 MG/DL (ref 6–20)
CALCIUM SERPL-MCNC: 10.1 MG/DL (ref 8.7–10.5)
CHLORIDE SERPL-SCNC: 108 MMOL/L (ref 95–110)
CO2 SERPL-SCNC: 25 MMOL/L (ref 23–29)
CREAT SERPL-MCNC: 1 MG/DL (ref 0.5–1.4)
DIFFERENTIAL METHOD BLD: NORMAL
EOSINOPHIL # BLD AUTO: 0.2 K/UL (ref 0–0.5)
EOSINOPHIL NFR BLD: 3.7 % (ref 0–8)
ERYTHROCYTE [DISTWIDTH] IN BLOOD BY AUTOMATED COUNT: 12.4 % (ref 11.5–14.5)
EST. GFR  (NO RACE VARIABLE): >60 ML/MIN/1.73 M^2
GLUCOSE SERPL-MCNC: 101 MG/DL (ref 70–110)
HCT VFR BLD AUTO: 43.3 % (ref 37–48.5)
HGB BLD-MCNC: 14.2 G/DL (ref 12–16)
IMM GRANULOCYTES # BLD AUTO: 0.02 K/UL (ref 0–0.04)
IMM GRANULOCYTES NFR BLD AUTO: 0.3 % (ref 0–0.5)
LYMPHOCYTES # BLD AUTO: 1.4 K/UL (ref 1–4.8)
LYMPHOCYTES NFR BLD: 21.1 % (ref 18–48)
MCH RBC QN AUTO: 29.6 PG (ref 27–31)
MCHC RBC AUTO-ENTMCNC: 32.8 G/DL (ref 32–36)
MCV RBC AUTO: 90 FL (ref 82–98)
MONOCYTES # BLD AUTO: 0.6 K/UL (ref 0.3–1)
MONOCYTES NFR BLD: 8.6 % (ref 4–15)
NEUTROPHILS # BLD AUTO: 4.3 K/UL (ref 1.8–7.7)
NEUTROPHILS NFR BLD: 65.8 % (ref 38–73)
NRBC BLD-RTO: 0 /100 WBC
PLATELET # BLD AUTO: 212 K/UL (ref 150–450)
PMV BLD AUTO: 11.8 FL (ref 9.2–12.9)
POTASSIUM SERPL-SCNC: 4.3 MMOL/L (ref 3.5–5.1)
PROT SERPL-MCNC: 6.8 G/DL (ref 6–8.4)
RBC # BLD AUTO: 4.79 M/UL (ref 4–5.4)
SODIUM SERPL-SCNC: 143 MMOL/L (ref 136–145)
TSH SERPL DL<=0.005 MIU/L-ACNC: 3.04 UIU/ML (ref 0.4–4)
WBC # BLD AUTO: 6.49 K/UL (ref 3.9–12.7)

## 2024-05-23 PROCEDURE — 84443 ASSAY THYROID STIM HORMONE: CPT | Performed by: INTERNAL MEDICINE

## 2024-05-23 PROCEDURE — 80053 COMPREHEN METABOLIC PANEL: CPT | Performed by: INTERNAL MEDICINE

## 2024-05-23 PROCEDURE — 36415 COLL VENOUS BLD VENIPUNCTURE: CPT | Mod: PO | Performed by: INTERNAL MEDICINE

## 2024-05-23 PROCEDURE — 85025 COMPLETE CBC W/AUTO DIFF WBC: CPT | Performed by: INTERNAL MEDICINE

## 2024-05-24 ENCOUNTER — PATIENT MESSAGE (OUTPATIENT)
Dept: FAMILY MEDICINE | Facility: CLINIC | Age: 59
End: 2024-05-24
Payer: COMMERCIAL

## 2024-05-28 ENCOUNTER — PATIENT MESSAGE (OUTPATIENT)
Dept: PSYCHIATRY | Facility: CLINIC | Age: 59
End: 2024-05-28
Payer: COMMERCIAL

## 2024-05-28 ENCOUNTER — TELEPHONE (OUTPATIENT)
Dept: PSYCHIATRY | Facility: CLINIC | Age: 59
End: 2024-05-28
Payer: COMMERCIAL

## 2024-05-28 ENCOUNTER — OFFICE VISIT (OUTPATIENT)
Dept: FAMILY MEDICINE | Facility: CLINIC | Age: 59
End: 2024-05-28
Payer: COMMERCIAL

## 2024-05-28 VITALS
BODY MASS INDEX: 35.45 KG/M2 | RESPIRATION RATE: 18 BRPM | SYSTOLIC BLOOD PRESSURE: 130 MMHG | OXYGEN SATURATION: 96 % | HEART RATE: 76 BPM | TEMPERATURE: 98 F | WEIGHT: 200.06 LBS | HEIGHT: 63 IN | DIASTOLIC BLOOD PRESSURE: 80 MMHG

## 2024-05-28 DIAGNOSIS — G43.009 MIGRAINE WITHOUT AURA AND WITHOUT STATUS MIGRAINOSUS, NOT INTRACTABLE: ICD-10-CM

## 2024-05-28 DIAGNOSIS — E66.09 CLASS 1 OBESITY DUE TO EXCESS CALORIES WITH SERIOUS COMORBIDITY AND BODY MASS INDEX (BMI) OF 32.0 TO 32.9 IN ADULT: ICD-10-CM

## 2024-05-28 DIAGNOSIS — F31.81 BIPOLAR 2 DISORDER: ICD-10-CM

## 2024-05-28 DIAGNOSIS — F43.10 PTSD (POST-TRAUMATIC STRESS DISORDER): ICD-10-CM

## 2024-05-28 DIAGNOSIS — M47.27 OSTEOARTHRITIS OF SPINE WITH RADICULOPATHY, LUMBOSACRAL REGION: ICD-10-CM

## 2024-05-28 DIAGNOSIS — I10 ESSENTIAL HYPERTENSION: ICD-10-CM

## 2024-05-28 DIAGNOSIS — F41.0 PANIC ATTACK: ICD-10-CM

## 2024-05-28 DIAGNOSIS — E78.5 HYPERLIPIDEMIA, UNSPECIFIED HYPERLIPIDEMIA TYPE: ICD-10-CM

## 2024-05-28 DIAGNOSIS — F33.1 MAJOR DEPRESSIVE DISORDER, RECURRENT EPISODE, MODERATE: ICD-10-CM

## 2024-05-28 DIAGNOSIS — K58.0 IRRITABLE BOWEL SYNDROME WITH DIARRHEA: ICD-10-CM

## 2024-05-28 DIAGNOSIS — I25.118 CORONARY ARTERY DISEASE INVOLVING NATIVE CORONARY ARTERY OF NATIVE HEART WITH OTHER FORM OF ANGINA PECTORIS: Primary | ICD-10-CM

## 2024-05-28 DIAGNOSIS — G47.33 OSA (OBSTRUCTIVE SLEEP APNEA): ICD-10-CM

## 2024-05-28 DIAGNOSIS — G62.9 POLYNEUROPATHY: ICD-10-CM

## 2024-05-28 DIAGNOSIS — J30.9 CHRONIC ALLERGIC RHINITIS: ICD-10-CM

## 2024-05-28 DIAGNOSIS — E03.9 HYPOTHYROIDISM, UNSPECIFIED TYPE: ICD-10-CM

## 2024-05-28 DIAGNOSIS — R91.1 LUNG NODULE: ICD-10-CM

## 2024-05-28 DIAGNOSIS — F51.04 PSYCHOPHYSIOLOGICAL INSOMNIA: ICD-10-CM

## 2024-05-28 DIAGNOSIS — F41.1 GAD (GENERALIZED ANXIETY DISORDER): ICD-10-CM

## 2024-05-28 LAB
ESTIMATED AVG GLUCOSE: 117 MG/DL (ref 68–131)
HBA1C MFR BLD: 5.7 % (ref 4–5.6)
VIT B12 SERPL-MCNC: 361 PG/ML (ref 210–950)

## 2024-05-28 PROCEDURE — G2211 COMPLEX E/M VISIT ADD ON: HCPCS | Mod: S$GLB,,, | Performed by: INTERNAL MEDICINE

## 2024-05-28 PROCEDURE — 84425 ASSAY OF VITAMIN B-1: CPT | Performed by: INTERNAL MEDICINE

## 2024-05-28 PROCEDURE — 1160F RVW MEDS BY RX/DR IN RCRD: CPT | Mod: CPTII,S$GLB,, | Performed by: INTERNAL MEDICINE

## 2024-05-28 PROCEDURE — 99214 OFFICE O/P EST MOD 30 MIN: CPT | Mod: S$GLB,,, | Performed by: INTERNAL MEDICINE

## 2024-05-28 PROCEDURE — 3008F BODY MASS INDEX DOCD: CPT | Mod: CPTII,S$GLB,, | Performed by: INTERNAL MEDICINE

## 2024-05-28 PROCEDURE — 3079F DIAST BP 80-89 MM HG: CPT | Mod: CPTII,S$GLB,, | Performed by: INTERNAL MEDICINE

## 2024-05-28 PROCEDURE — 1159F MED LIST DOCD IN RCRD: CPT | Mod: CPTII,S$GLB,, | Performed by: INTERNAL MEDICINE

## 2024-05-28 PROCEDURE — 83036 HEMOGLOBIN GLYCOSYLATED A1C: CPT | Performed by: INTERNAL MEDICINE

## 2024-05-28 PROCEDURE — 3075F SYST BP GE 130 - 139MM HG: CPT | Mod: CPTII,S$GLB,, | Performed by: INTERNAL MEDICINE

## 2024-05-28 PROCEDURE — 82607 VITAMIN B-12: CPT | Performed by: INTERNAL MEDICINE

## 2024-05-28 RX ORDER — GALCANEZUMAB 120 MG/ML
INJECTION, SOLUTION SUBCUTANEOUS
COMMUNITY
Start: 2024-02-27

## 2024-05-28 RX ORDER — ASPIRIN 81 MG/1
81 TABLET ORAL DAILY
Qty: 90 TABLET | Refills: 3 | Status: SHIPPED | OUTPATIENT
Start: 2024-05-28 | End: 2025-05-28

## 2024-05-28 NOTE — PROGRESS NOTES
Subjective:       Patient ID: Delaney Noel is a 59 y.o. female.    Medication List with Changes/Refills   Current Medications    ALBUTEROL (PROVENTIL/VENTOLIN HFA) 90 MCG/ACTUATION INHALER    Inhale 2 puffs into the lungs every 6 (six) hours as needed for Wheezing. Rescue    ATENOLOL (TENORMIN) 50 MG TABLET    Take 1 tablet (50 mg total) by mouth once daily.    CETIRIZINE (ZYRTEC) 10 MG TABLET    Take 10 mg by mouth once daily. Take one(1) tab by mouth daily    CLONAZEPAM (KLONOPIN) 1 MG TABLET    Take 1 tablet (1 mg total) by mouth daily as needed for Anxiety.    DICYCLOMINE (BENTYL) 10 MG CAPSULE    TAKE 1 CAPSULE BY MOUTH BEFORE MEALS AND AT BEDTIME AS NEEDED (ABDOMINAL PAIN DIARRHEA).    EMGALITY SYRINGE 120 MG/ML SYRG    Inject into the skin.    ESCITALOPRAM OXALATE (LEXAPRO) 20 MG TABLET    Take 1.5 tablets (30 mg total) by mouth once daily.    FLUTICASONE PROPIONATE (FLONASE) 50 MCG/ACTUATION NASAL SPRAY    2 sprays (100 mcg total) by Each Nostril route once daily.    GABAPENTIN (NEURONTIN) 300 MG CAPSULE    Take 1 capsule (300 mg total) by mouth every evening.    IBUPROFEN (ADVIL,MOTRIN) 800 MG TABLET    Take 800 mg by mouth 3 (three) times daily as needed.     KETOROLAC (TORADOL) 10 MG TABLET        LAMOTRIGINE (LAMICTAL) 25 MG TABLET    Take 3 tablets (75 mg total) by mouth once daily. Take three tabs(75mg) by mouth daily    LEVOTHYROXINE (SYNTHROID) 75 MCG TABLET    Take 1 tablet (75 mcg total) by mouth before breakfast.    MELOXICAM (MOBIC) 15 MG TABLET    Take 1 tablet (15 mg total) by mouth once daily.    METHOCARBAMOL (ROBAXIN) 750 MG TAB    Take 1 tablet (750 mg total) by mouth nightly as needed.    QUETIAPINE (SEROQUEL) 100 MG TAB    Take 1 tablet (100 mg total) by mouth once daily.    ROSUVASTATIN (CRESTOR) 10 MG TABLET    Take 1 tablet (10 mg total) by mouth once daily.    SUMATRIPTAN (IMITREX) 100 MG TABLET    Take at onset of migraine, can repeat in 2 hrs if needed.  No more than twice per  day or 3 days/wk.   Changed and/or Refilled Medications    Modified Medication Previous Medication    ASPIRIN (ECOTRIN) 81 MG EC TABLET aspirin (ECOTRIN) 81 MG EC tablet       Take 1 tablet (81 mg total) by mouth once daily.    Take 1 tablet (81 mg total) by mouth once daily.       Chief Complaint: Follow-up  She is here today to f/u on chronic medical issues.     Today she complains of one month of painful bilateral tingling on the bottom of her feet and into her ankles. Nothing makes it better or worse. Rated 5/10 and symptoms are constant.  No known triggers. No changes in medication. TSH normal and HbA1c normal in the past. HIV negative in 2021.      She has hypertension and is taking atenolol 50 mg qday.  She had a positive stress test and then subsequent cath (do not have records) and she reports she had non-obstructive CAD. She does complain of chest pain with exertion that is unchanged. Nuclear stress test on 12/2023 was negative.      She has hyperlipidemia and is taking crestor 10 mg qday. Her lipids on 11/2023 were 139/199/33/66.  She is on aspirin daily.        She has hypothyroid and is taking levothyroxine 75 mcg daily.  Her TSH on 5/2024 was normal. Thyroid u/s on 12/2023 was stable.       She has a small 4 mm lung nodule seen on CT on 7/2014. Repeat CT on on 10/2022 was unchanged. No further imaging needed.      She has fatty liver seen on CT on 12/2019. She has normal LFTs on 5/2024.      She has IBS with diarrhea. She has normal stools studies on 11/2021.  She takes bentyl twice a day which controls her abdominal cramps and diarrhea. She was seen by GI on 10/2022 who recommended a colonoscopy but she did not want this done. Overall her symptoms are improved on bentyl.      She has chronic migraines and is followed by neurology.  She is taking emgality monthly and toradol as needed. Symptoms associated with migraines are sensitive to light with nausea, dizziness and confusion.  She reports having  increased headaches with over 14 headaches last month. She was seen by neurology on 1/2024 and advised to f/u in 6 months.      She has LOUISE and is not using cpap regularly. Diagnosed with sleep study on 12/2022.      She has multiple psychiatric issues. She has major depression with suicidal ideations (recent hospitalization for suicidal thoughts on 12/3/2019), anxiety with panic attack, bipolar type 2, PTSD with insomnia and OCD. She continues on klonopin 1 mg daily, lamictal 75 mg daily and lexapro 20 mg daily and seroquel 100 mg nightly.  Today she reports a couple weeks of increasing depression and sad thoughts. She is crying more frequently.  No suicidal ideations. Her sleeping is improved on the higher dose of seroquel.  She has chronic anxiety that is unchanged.      She has chronic low back pain since 10/2017. Around the time it was diagnosed she was having pain down her right leg and trouble walking.  She had an MRI of lumbar and cervical spine in 1/2019 showing mild spondylotic changes in L4-L5, L5-S1 and mild disease in cervical vertebra. She completed  PT.  She is taking mobic 15 mg qam and robaxin 750 mg qhs PRN muscle spasms and gabapentin 300 mg when she has radicular pain.  She reports pain worse with prolonged sitting or driving, and better with moving, home exercises, mobic and gabapentin 300 mg nightly.  Rated 1/10 to 10/10.  No weakness but her pain does radiate down her right leg.  she was seen by pain clinic on 5/2024 and is scheduled to have ANUJA of L5-S1 next week.      She lives with her  and feels safe at home. She does not exercise.  She is no longer working. She enjoys traveling in her RV.      Colonoscopy---7/2020  Mammogram----3/2024 neg   DEXA---3/2024 osteopenia with fracture risk  of 11%, hip fracture risk of 0.6% (Tv -1.5, Tf -1.0)  Pap-----12/2017 neg HPV neg---JAMIE  Tdap---9/2019  Influenza vaccine---11/2023  Prevnar 20--- 4/2023  Shingles vaccine-----10/2022  Covid  "vaccine----4 doses       Review of Systems   Constitutional:  Negative for appetite change, fatigue, fever and unexpected weight change.   HENT:  Negative for congestion, ear pain, hearing loss, sore throat and trouble swallowing.    Eyes:  Negative for pain and visual disturbance.   Respiratory:  Negative for cough, chest tightness, shortness of breath and wheezing.    Cardiovascular:  Negative for chest pain, palpitations and leg swelling.   Gastrointestinal:  Positive for diarrhea. Negative for abdominal pain, blood in stool, constipation, nausea and vomiting.   Endocrine: Negative for polyuria.   Genitourinary:  Negative for dysuria and hematuria.   Musculoskeletal:  Negative for arthralgias, back pain and myalgias.   Skin:  Negative for rash.   Allergic/Immunologic: Positive for environmental allergies.   Neurological:  Positive for headaches. Negative for dizziness, weakness and numbness.   Hematological:  Does not bruise/bleed easily.   Psychiatric/Behavioral:  Positive for dysphoric mood. Negative for sleep disturbance and suicidal ideas. The patient is nervous/anxious.        Objective:      Vitals:    05/28/24 1012   BP: 130/80   BP Location: Right arm   Patient Position: Sitting   BP Method: Medium (Manual)   Pulse: 76   Resp: 18   Temp: 98.1 °F (36.7 °C)   SpO2: 96%   Weight: 90.8 kg (200 lb 1.1 oz)   Height: 5' 3" (1.6 m)     Body mass index is 35.44 kg/m².  Physical Exam    General appearance: No acute distress, cooperative  Eyes: PERRL, EOMI, conjunctiva clear  Ears: normal external ear and pinna, tm clear without drainage, canals clear  Nose: Normal mucosa without drainage  Throat: no exudates or erythema, tonsils not enlarged  Mouth: no sores or lesions, moist mucous membranes  Neck: FROM, soft, supple, no thyromegaly, no bruits  Lymph: no anterior or posterior cervical adenopathy  Heart::  Regular rate and rhythm, no murmur  Lung: Clear to ascultation bilaterally, no wheezing, no rales, no rhonchi, " no distress  Abdomen: Soft, nontender, no distention, no hepatosplenomegaly, bowel sounds normal, no guarding, no rebound, no peritoneal signs  Skin: no rashes, no lesions  Extremities: no edema, no cyanosis  Neuro: CN 2-12 intact, 5/5 muscle strength upper and lower extremity bilaterally, 2+ DTRs UE and LE bilaterally, normal gait  Peripheral pulses: 2+ pedal pulses bilaterally, good perfusion and color  Musculoskeletal: FROM, good strenth, no tenderness  Joint: normal appearance, no swelling, no warmth, no deformity in all joints     Assessment:       1. Coronary artery disease involving native coronary artery of native heart with other form of angina pectoris    2. Essential hypertension    3. Hyperlipidemia, unspecified hyperlipidemia type    4. Lung nodule    5. Chronic allergic rhinitis    6. Hypothyroidism, unspecified type    7. Irritable bowel syndrome with diarrhea    8. Migraine without aura and without status migrainosus, not intractable    9. Major depressive disorder, recurrent episode, moderate    10. Bipolar 2 disorder    11. PTSD (post-traumatic stress disorder)    12. HUNTER (generalized anxiety disorder)    13. Panic attack    14. Psychophysiological insomnia    15. LOUISE (obstructive sleep apnea)    16. Osteoarthritis of spine with radiculopathy, lumbosacral region    17. Class 1 obesity due to excess calories with serious comorbidity and body mass index (BMI) of 32.0 to 32.9 in adult    18. Polyneuropathy        Plan:       Coronary artery disease involving native coronary artery of native heart with other form of angina pectoris  Mild chest pain with anxiety but normal stress test. Continue current regimen    Essential hypertension  Well controlled and continue current regimen.   -     CBC Auto Differential; Future; Expected date: 05/28/2024  -     Comprehensive Metabolic Panel; Future; Expected date: 05/28/2024  -     Lipid Panel; Future; Expected date: 05/28/2024  -     TSH; Future; Expected date:  05/28/2024    Hyperlipidemia, unspecified hyperlipidemia type  Good control on crestor and aspirin  -     aspirin (ECOTRIN) 81 MG EC tablet; Take 1 tablet (81 mg total) by mouth once daily.  Dispense: 90 tablet; Refill: 3    Lung nodule  No further imaging needed    Chronic allergic rhinitis  Well controlled and continue current regimen.     Hypothyroidism, unspecified type  Good control on this dose of levothyroxine    Irritable bowel syndrome with diarrhea  Improved on bentyl bid    Migraine without aura and without status migrainosus, not intractable  Increasing migraines and is scheduled to see neurology in July.     Major depressive disorder, recurrent episode, moderate  Uncontrolled and very complex patient. Will refer to psychiatry. If she is not able to get an appointment in a reasonable time then will do an econsult for guidance.   -     Ambulatory referral/consult to Psychiatry; Future; Expected date: 06/04/2024    Bipolar 2 disorder  Stable on lamictal.   -     Ambulatory referral/consult to Psychiatry; Future; Expected date: 06/04/2024    PTSD (post-traumatic stress disorder)  Continue current regimen.    HUNTER (generalized anxiety disorder) with panic attacks.   Uncontrolled and she continues to feel anxious most days. Refer to psychiatry for evaluation.   -     Ambulatory referral/consult to Psychiatry; Future; Expected date: 06/04/2024    Psychophysiological insomnia  Improved on higher dose of seroquel.      LOUISE (obstructive sleep apnea)  Encouraged her to use cpap nightly    Osteoarthritis of spine with radiculopathy, lumbosacral region  Uncontrolled and scheduled for ANUJA next week. She continues to follow with pain clinic.     Class 1 obesity due to excess calories with serious comorbidity and body mass index (BMI) of 32.0 to 32.9 in adult  Long discussion on the benefits of healthy eating and regular exercise to help lose weight and help control cad, hypertension and hyperlipidemia.      Polyneuropathy  Question etiology. Will check vitamin levels and HbA1c. If normal then will continue to monitor. If worsens then consider referral to neurology for evaluation.   -     Vitamin B12  -     VITAMIN B1  -     Hemoglobin A1C    Visit today included increased complexity associated with managing the longitudinal care of the patient due to the serious and/or complex managed problem(s).     Follow up in about 6 months (around 11/28/2024) for chronic medical issues.

## 2024-05-28 NOTE — PROGRESS NOTES
Venipuncture performed with 21 gauge butterfly, x's 1 attempt,  to L Antecubital vein.  Specimens collected per orders.      Pressure dressing applied to site, instructed [id number] to remove dressing in 10-15 minutes, OK to re-adjust dressing if pressure causing any discomfort, to observe closely for numbness and/or discoloration to hand or fingers, and to notify provider if bleeding persists after applying constant pressure lasting 30 minutes.

## 2024-05-30 DIAGNOSIS — M47.816 OSTEOARTHRITIS OF CERVICAL AND LUMBAR SPINE: ICD-10-CM

## 2024-05-30 DIAGNOSIS — M47.812 OSTEOARTHRITIS OF CERVICAL AND LUMBAR SPINE: ICD-10-CM

## 2024-05-30 NOTE — TELEPHONE ENCOUNTER
Refill Routing Note   Medication(s) are not appropriate for processing by [facility] for the following reason(s):        Outside of protocol    [facility] action(s):  Route               Appointments  past 12m or future 3m with PCP    Date Provider   Last Visit   5/28/2024 Yesi Swartz, DO   Next Visit   Visit date not found Yesi Swartz,    ED visits in past 90 days: 0        Note composed:3:34 PM 05/30/2024

## 2024-05-30 NOTE — TELEPHONE ENCOUNTER
No care due was identified.  [facility] Embedded Care Due Messages. Reference number: [id number].   5/30/2024 12:12:01 PM CDT

## 2024-05-30 NOTE — TELEPHONE ENCOUNTER
No care due was identified.  [facility] Embedded Care Due Messages. Reference number: [id number].   5/30/2024 3:18:07 PM CDT

## 2024-05-31 RX ORDER — MELOXICAM 15 MG/1
15 TABLET ORAL DAILY
Qty: 90 TABLET | Refills: 1 | Status: SHIPPED | OUTPATIENT
Start: 2024-05-31

## 2024-05-31 RX ORDER — CLONAZEPAM 1 MG/1
1 TABLET ORAL DAILY PRN
Qty: 30 TABLET | Refills: 5 | Status: SHIPPED | OUTPATIENT
Start: 2024-05-31 | End: 2025-05-31

## 2024-06-03 LAB — VIT B1 BLD-MCNC: 101 UG/L (ref 38–122)

## 2024-06-04 ENCOUNTER — HOSPITAL ENCOUNTER (OUTPATIENT)
Facility: HOSPITAL | Age: 59
Discharge: HOME | End: 2024-06-04
Attending: ANESTHESIOLOGY | Admitting: ANESTHESIOLOGY
Payer: COMMERCIAL

## 2024-06-04 ENCOUNTER — HOSPITAL ENCOUNTER (OUTPATIENT)
Dept: RADIOLOGY | Facility: HOSPITAL | Age: 59
Discharge: HOME | End: 2024-06-04
Attending: ANESTHESIOLOGY | Admitting: ANESTHESIOLOGY
Payer: COMMERCIAL

## 2024-06-04 ENCOUNTER — PATIENT MESSAGE (OUTPATIENT)
Dept: FAMILY MEDICINE | Facility: CLINIC | Age: 59
End: 2024-06-04
Payer: COMMERCIAL

## 2024-06-04 VITALS
TEMPERATURE: 97 F | BODY MASS INDEX: 35.44 KG/M2 | DIASTOLIC BLOOD PRESSURE: 73 MMHG | OXYGEN SATURATION: 95 % | WEIGHT: 200 LBS | RESPIRATION RATE: 16 BRPM | SYSTOLIC BLOOD PRESSURE: 143 MMHG | HEIGHT: 63 IN | HEART RATE: 62 BPM

## 2024-06-04 DIAGNOSIS — M54.50 LOWER BACK PAIN: ICD-10-CM

## 2024-06-04 DIAGNOSIS — M54.16 LUMBAR RADICULOPATHY: Primary | ICD-10-CM

## 2024-06-04 PROCEDURE — 25500020 PHARM REV CODE 255: Mod: PO | Performed by: ANESTHESIOLOGY

## 2024-06-04 PROCEDURE — 63600175 PHARM REV CODE 636 W HCPCS: Mod: PO | Performed by: ANESTHESIOLOGY

## 2024-06-04 PROCEDURE — 76000 FLUOROSCOPY <1 HR PHYS/QHP: CPT | Mod: TC,PO

## 2024-06-04 PROCEDURE — 62323 NJX INTERLAMINAR LMBR/SAC: CPT | Mod: ,,, | Performed by: ANESTHESIOLOGY

## 2024-06-04 PROCEDURE — 25000003 PHARM REV CODE 250: Mod: PO | Performed by: ANESTHESIOLOGY

## 2024-06-04 PROCEDURE — 62323 NJX INTERLAMINAR LMBR/SAC: CPT | Mod: PO | Performed by: ANESTHESIOLOGY

## 2024-06-04 RX ORDER — METHYLPREDNISOLONE ACETATE 80 MG/ML
INJECTION, SUSPENSION INTRA-ARTICULAR; INTRALESIONAL; INTRAMUSCULAR; SOFT TISSUE
Status: DISCONTINUED | OUTPATIENT
Start: 2024-06-04 | End: 2024-06-04 | Stop reason: HOSPADM

## 2024-06-04 RX ORDER — LIDOCAINE HYDROCHLORIDE 10 MG/ML
INJECTION, SOLUTION EPIDURAL; INFILTRATION; INTRACAUDAL; PERINEURAL
Status: DISCONTINUED | OUTPATIENT
Start: 2024-06-04 | End: 2024-06-04 | Stop reason: HOSPADM

## 2024-06-04 RX ORDER — ALPRAZOLAM 0.5 MG/1
1 TABLET, ORALLY DISINTEGRATING ORAL ONCE AS NEEDED
Status: DISCONTINUED | OUTPATIENT
Start: 2024-06-04 | End: 2024-06-04 | Stop reason: HOSPADM

## 2024-06-04 NOTE — INTERVAL H&P NOTE
The patient has been examined and the H&P has been reviewed:    I concur with the findings and no changes have occurred since H&P was written.  She has held her aspirin appropriately. The risks and benefits of this intervention, and alternative therapies were discussed with the patient.  The discussion of risks included infection, bleeding, need for additional procedures or surgery, nerve damage.  Questions regarding the procedure, risks, expected outcome, and possible side effects were solicited and answered to the patient's satisfaction.  Delaney Noel wishes to proceed with the injection or procedure.  Written consent was obtained.      There are no hospital problems to display for this patient.

## 2024-06-04 NOTE — OP NOTE
"Procedure Note    Procedure Date: 6/4/2024    Procedure Performed:  L5/S1 lumbar interlaminar epidural steroid injection under fluoroscopy.    Indications:  Delaney Noel presents with lumbar radiculitis/radiculopathy secondary to disc herniation, osteophyte/osteophyte complexes, and/or severe degenerative disc disease producing foraminal or central spinal stenosis.  The pain has been present for at least 4 weeks and the patient has failed to respond to noninvasive conservative care.  Pain rated by NRS at baseline prior to intervention is 6/10.  Their radiculitis/radiculopathy and/or neurogenic claudication is severe enough to greatly impact their quality of life or function.     Pre-op diagnosis: Lumbar Radiculopathy    Post-op diagnosis: same    Physician: Chandler Pereira MD    IV anxiolysis medications: none    Medications injected: depomedrol 80mg, 1% Lidocaine 1ml, 2 mL sterile, preservative-free normal saline.    Local anesthetic used: 1% Lidocaine, 1 ml    Estimated Blood Loss: none    Complications:  none    Technique:  The patient was interviewed in the holding area and Risks/Benefits were discussed, including, but not limited to, the possibility of new or different pain, bleeding or infection.   All questions were answered.  The patient understood and accepted risks.  Consent was verfied.  A time-out was taken to identify patient and procedure prior to starting the procedure. The patient was placed in the prone position on the fluoroscopy table. The area of the lumbar spine was prepped with Chloraprep x2 and draped in a sterile manner. The L5/S1 interspace was identified and marked under AP fluoroscopy. The skin and subcutaneous tissues overlying the targeted interspace were anesthetized with 3-5 mL of 1% lidocaine using a 25G, 1.5" needle.  A 20G, 3.5" Tuohy epidural needle was directed toward the interspace under fluoroscopic guidance until the ligamentum flavum was engaged. From this point, a loss of " resistance technique with a glass syringe and saline was used to identify entrance of the needle into the epidural space. Once loss of resistance was observed 1 mL of contrast solution was injected. An appropriate epidurogram was noted.  A 4 mL mixture consisting of saline, 1 mL 1% Lidocaine and 80 mg of depomedrol was injected slowly and without resistance.  The needle was flushed with normal saline and removed. The contrast was seen to be displaced after injection. Patient was awake/responsive during all injections.  The patient tolerated the procedure well and was transferred to the [facility] in stable condition.  The patient was monitored after the procedure and was given post-procedure and discharge instructions to follow at home. The patient was discharged in a stable condition.

## 2024-06-04 NOTE — DISCHARGE SUMMARY
[facility]  Discharge Note  Short Stay    Admit Date: 6/4/2024    Discharge Date: 6/4/2024    Attending Physician: Chandler Pereira     Discharge Provider: Chandler Pereira    Diagnoses:  There are no hospital problems to display for this patient.      Discharged Condition: Good    Final Diagnoses: Lumbar radiculopathy [M54.16]    Disposition: Home or Self Care    Hospital Course: No complications, uneventful    Outcome of Hospitalization, Treatment, Procedure, or Surgery:  Patient was admitted for outpatient interventional pain management procedure. The patient tolerated the procedure well with no complications.    Follow up/Patient Instructions:  Follow up as scheduled in Pain Management office in 2-3 weeks.  Patient has received instructions and follow up date and time.    Medications:  Continue previous medications, except restart aspirin in 24 hours    Discharge Procedure Orders   Notify your health care provider if you experience any of the following:  temperature >100.4     Notify your health care provider if you experience any of the following:  persistent nausea and vomiting or diarrhea     Notify your health care provider if you experience any of the following:  severe uncontrolled pain     Notify your health care provider if you experience any of the following:  redness, tenderness, or signs of infection (pain, swelling, redness, odor or green/yellow discharge around incision site)     Notify your health care provider if you experience any of the following:  difficulty breathing or increased cough     Notify your health care provider if you experience any of the following:  severe persistent headache     Notify your health care provider if you experience any of the following:  worsening rash     Notify your health care provider if you experience any of the following:  persistent dizziness, light-headedness, or visual disturbances     Notify your health care provider if you experience any of the  following:  increased confusion or weakness     Activity as tolerated
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
## 2024-06-07 ENCOUNTER — OFFICE VISIT (OUTPATIENT)
Dept: PSYCHIATRY | Facility: CLINIC | Age: 59
End: 2024-06-07
Payer: COMMERCIAL

## 2024-06-07 VITALS
DIASTOLIC BLOOD PRESSURE: 102 MMHG | BODY MASS INDEX: 35.44 KG/M2 | HEART RATE: 56 BPM | SYSTOLIC BLOOD PRESSURE: 177 MMHG | WEIGHT: 200 LBS | HEIGHT: 63 IN

## 2024-06-07 DIAGNOSIS — F33.1 MAJOR DEPRESSIVE DISORDER, RECURRENT EPISODE, MODERATE: ICD-10-CM

## 2024-06-07 DIAGNOSIS — F41.1 GAD (GENERALIZED ANXIETY DISORDER): ICD-10-CM

## 2024-06-07 DIAGNOSIS — F51.02 INSOMNIA DUE TO PSYCHOLOGICAL STRESS: ICD-10-CM

## 2024-06-07 DIAGNOSIS — F31.81 BIPOLAR 2 DISORDER: ICD-10-CM

## 2024-06-07 DIAGNOSIS — F43.12 CHRONIC POST-TRAUMATIC STRESS DISORDER (PTSD): Primary | ICD-10-CM

## 2024-06-07 PROCEDURE — 99999 PR PBB SHADOW E&M-EST. PATIENT-LVL V: CPT | Mod: PBBFAC,,,

## 2024-06-07 PROCEDURE — 3008F BODY MASS INDEX DOCD: CPT | Mod: CPTII,S$GLB,,

## 2024-06-07 PROCEDURE — 3077F SYST BP >= 140 MM HG: CPT | Mod: CPTII,S$GLB,,

## 2024-06-07 PROCEDURE — 3080F DIAST BP >= 90 MM HG: CPT | Mod: CPTII,S$GLB,,

## 2024-06-07 PROCEDURE — 99205 OFFICE O/P NEW HI 60 MIN: CPT | Mod: S$GLB,,,

## 2024-06-07 PROCEDURE — 1160F RVW MEDS BY RX/DR IN RCRD: CPT | Mod: CPTII,S$GLB,,

## 2024-06-07 PROCEDURE — 90833 PSYTX W PT W E/M 30 MIN: CPT | Mod: S$GLB,,,

## 2024-06-07 PROCEDURE — 3044F HG A1C LEVEL LT 7.0%: CPT | Mod: CPTII,S$GLB,,

## 2024-06-07 PROCEDURE — 1159F MED LIST DOCD IN RCRD: CPT | Mod: CPTII,S$GLB,,

## 2024-06-07 RX ORDER — TRAZODONE HYDROCHLORIDE 100 MG/1
100 TABLET ORAL NIGHTLY PRN
Qty: 30 TABLET | Refills: 1 | Status: SHIPPED | OUTPATIENT
Start: 2024-06-07 | End: 2024-08-06

## 2024-06-07 RX ORDER — VENLAFAXINE HYDROCHLORIDE 37.5 MG/1
37.5 CAPSULE, EXTENDED RELEASE ORAL DAILY
Qty: 7 CAPSULE | Refills: 0 | Status: SHIPPED | OUTPATIENT
Start: 2024-06-08 | End: 2024-06-15

## 2024-06-07 RX ORDER — VENLAFAXINE HYDROCHLORIDE 75 MG/1
75 CAPSULE, EXTENDED RELEASE ORAL DAILY
Qty: 23 CAPSULE | Refills: 0 | Status: SHIPPED | OUTPATIENT
Start: 2024-06-15 | End: 2024-07-08

## 2024-06-07 NOTE — PATIENT INSTRUCTIONS
1. Continue clonazePAM (KLONOPIN) 1 MG tablet - Take 1 tablet (1 mg total) by mouth daily as needed for Anxiety. Discussed risk of decreased RT, sedation, addictive potential, and not to mix with alcohol.     2. Continue lamoTRIgine (LAMICTAL) 25 MG tablet - Take 3 tablets (75 mg total) by mouth once daily at bedtime for bipolar disorder.    3. Taper off QUEtiapine (SEROQUEL) 100 MG Tab - Take 0.5 tablet (50 mg total) by mouth once daily at bedtime for 7 days, THEN Stop Seroquel.     4. Taper off EScitalopram oxalate (LEXAPRO) 20 MG tablet - Take 1 tablet (20 mg total) by mouth once daily for 4 days, THEN Take 0.5 tablet (10 mg total) by mouth once daily for 4 days, THEN Stop Lexapro.    5. Start cariprazine (VRAYLAR) 1.5 mg Cap - Take 1 capsule (1.5 mg total) by mouth once daily. for 7 days, THEN   Start cariprazine (VRAYLAR) 3 mg Cap - Take 1 capsule (3 mg total) by mouth once daily for bipolar disorder.     6. Start venlafaxine (EFFEXOR-XR) 37.5 MG 24 hr capsule - Take 1 capsule (37.5 mg total) by mouth once daily. for 7 days, THEN   Start venlafaxine (EFFEXOR-XR) 75 MG 24 hr capsule - Take 1 capsule (75 mg total) by mouth once daily for depression, anxiety, PTSD.     7. Start traZODone (DESYREL) 100 MG tablet - Take 1 tablet (100 mg total) by mouth nightly as needed for Insomnia.

## 2024-06-11 ENCOUNTER — TELEPHONE (OUTPATIENT)
Dept: PSYCHIATRY | Facility: CLINIC | Age: 59
End: 2024-06-11
Payer: COMMERCIAL

## 2024-06-18 ENCOUNTER — OFFICE VISIT (OUTPATIENT)
Dept: PAIN MEDICINE | Facility: CLINIC | Age: 59
End: 2024-06-18
Payer: COMMERCIAL

## 2024-06-18 ENCOUNTER — TELEPHONE (OUTPATIENT)
Dept: PAIN MEDICINE | Facility: CLINIC | Age: 59
End: 2024-06-18

## 2024-06-18 VITALS
HEART RATE: 74 BPM | WEIGHT: 194.88 LBS | BODY MASS INDEX: 34.53 KG/M2 | HEIGHT: 63 IN | SYSTOLIC BLOOD PRESSURE: 152 MMHG | DIASTOLIC BLOOD PRESSURE: 70 MMHG

## 2024-06-18 DIAGNOSIS — M54.41 CHRONIC BILATERAL LOW BACK PAIN WITH RIGHT-SIDED SCIATICA: ICD-10-CM

## 2024-06-18 DIAGNOSIS — M47.816 LUMBAR SPONDYLOSIS: Primary | ICD-10-CM

## 2024-06-18 DIAGNOSIS — M54.51 VERTEBROGENIC LOW BACK PAIN: ICD-10-CM

## 2024-06-18 DIAGNOSIS — M54.16 LUMBAR RADICULOPATHY: ICD-10-CM

## 2024-06-18 DIAGNOSIS — G89.29 CHRONIC BILATERAL LOW BACK PAIN WITH RIGHT-SIDED SCIATICA: ICD-10-CM

## 2024-06-18 PROCEDURE — 3078F DIAST BP <80 MM HG: CPT | Mod: CPTII,S$GLB,,

## 2024-06-18 PROCEDURE — 99214 OFFICE O/P EST MOD 30 MIN: CPT | Mod: S$GLB,,,

## 2024-06-18 PROCEDURE — 3044F HG A1C LEVEL LT 7.0%: CPT | Mod: CPTII,S$GLB,,

## 2024-06-18 PROCEDURE — 3077F SYST BP >= 140 MM HG: CPT | Mod: CPTII,S$GLB,,

## 2024-06-18 PROCEDURE — 99999 PR PBB SHADOW E&M-EST. PATIENT-LVL IV: CPT | Mod: PBBFAC,,,

## 2024-06-18 PROCEDURE — 1159F MED LIST DOCD IN RCRD: CPT | Mod: CPTII,S$GLB,,

## 2024-06-18 PROCEDURE — 3008F BODY MASS INDEX DOCD: CPT | Mod: CPTII,S$GLB,,

## 2024-06-18 NOTE — TELEPHONE ENCOUNTER
Please schedule patient for the following procedure:    Physician - Dr Pereira    Type of Procedure/Injection - Lumbar Medial Branch Block  L4/5 and L5/S1           Laterality - Bilateral      Anxiolysis- RNIV      Need to hold medication - No      N/A      Clearance needed - No      Follow up - phone call next day

## 2024-06-18 NOTE — PROGRESS NOTES
[facility] Follow Up Evaluation      Referred by: No ref. provider found    PCP: Dr. Swartz    CC:   Chief Complaint   Patient presents with    Low-back Pain     Bilateral low back pain           5/16/2024    11:16 AM   Last 3 PDI Scores   Pain Disability Index (PDI) 28     Interval HPI 6/18/2024: Delaney Noel returns to the office for follow up.  She is s/p L5/S1 ANUJA on 06/04/2024 with 60% relief.  Previous pain in her legs has resolved, her remaining pain is across her lower back, worsened with physical activities, improved with rest.  She denies any new numbness, weakness or any new changes to her bowel or bladder function.  No significant relief gabapentin, Mobic and Robaxin.      HPI:   Delaney Noel is a 59 y.o. female who presents with back pain.  She is had chronic lower back pain for many years it has been gradually worsening.  Today her pain is 8/10, constant, aching, sharp with radiating pain down the back of the right leg stopping at the knee.  She denies any numbness or weakness.      Pain Intervention History:  - s/p L5/S1 ANUJA on 11/23/2022 with initially 100% relief and now 30% relief.  - s/p L5/S1 ANUJA on 07/25/2023 with 85% relief.  - s/p L5/S1 ANUJA on 06/04/2024 with 60% relief.     Past Spine Surgical History:      Past and current medications:  Antineuropathics:  NSAIDs: mobic, ibuprofen, toradol  Physical therapy: yes, currently   Antidepressants: gabapentin  Muscle relaxers: robaxin  Opioids:  Antiplatelets/Anticoagulants: aspirin    History:    Current Outpatient Medications:     albuterol (PROVENTIL/VENTOLIN HFA) 90 mcg/actuation inhaler, Inhale 2 puffs into the lungs every 6 (six) hours as needed for Wheezing. Rescue, Disp: 18 g, Rfl: 2    aspirin (ECOTRIN) 81 MG EC tablet, Take 1 tablet (81 mg total) by mouth once daily., Disp: 90 tablet, Rfl: 3    atenoloL (TENORMIN) 50 MG tablet, Take 1 tablet (50 mg total) by mouth once daily., Disp: 90 tablet, Rfl: 3    cariprazine  (VRAYLAR) 3 mg Cap, Take 1 capsule (3 mg total) by mouth once daily. for 23 days, Disp: 23 capsule, Rfl: 0    cetirizine (ZYRTEC) 10 MG tablet, Take 10 mg by mouth once daily. Take one(1) tab by mouth daily, Disp: , Rfl:     clonazePAM (KLONOPIN) 1 MG tablet, Take 1 tablet (1 mg total) by mouth daily as needed for Anxiety., Disp: 30 tablet, Rfl: 5    dicyclomine (BENTYL) 10 MG capsule, TAKE 1 CAPSULE BY MOUTH BEFORE MEALS AND AT BEDTIME AS NEEDED (ABDOMINAL PAIN DIARRHEA)., Disp: 360 capsule, Rfl: 1    EMGALITY SYRINGE 120 mg/mL Syrg, Inject into the skin., Disp: , Rfl:     fluticasone propionate (FLONASE) 50 mcg/actuation nasal spray, 2 sprays (100 mcg total) by Each Nostril route once daily., Disp: 16 g, Rfl: 6    gabapentin (NEURONTIN) 300 MG capsule, Take 1 capsule (300 mg total) by mouth every evening., Disp: 90 capsule, Rfl: 3    ibuprofen (ADVIL,MOTRIN) 800 MG tablet, Take 800 mg by mouth 3 (three) times daily as needed. , Disp: , Rfl:     ketorolac (TORADOL) 10 mg tablet, , Disp: , Rfl:     lamoTRIgine (LAMICTAL) 25 MG tablet, Take 3 tablets (75 mg total) by mouth once daily. Take three tabs(75mg) by mouth daily, Disp: 270 tablet, Rfl: 3    levothyroxine (SYNTHROID) 75 MCG tablet, Take 1 tablet (75 mcg total) by mouth before breakfast., Disp: 90 tablet, Rfl: 3    meloxicam (MOBIC) 15 MG tablet, Take 1 tablet (15 mg total) by mouth once daily., Disp: 90 tablet, Rfl: 1    methocarbamoL (ROBAXIN) 750 MG Tab, Take 1 tablet (750 mg total) by mouth nightly as needed., Disp: 90 tablet, Rfl: 2    rosuvastatin (CRESTOR) 10 MG tablet, Take 1 tablet (10 mg total) by mouth once daily., Disp: 90 tablet, Rfl: 3    sumatriptan (IMITREX) 100 MG tablet, Take at onset of migraine, can repeat in 2 hrs if needed.  No more than twice per day or 3 days/wk., Disp: 18 tablet, Rfl: 5    traZODone (DESYREL) 100 MG tablet, Take 1 tablet (100 mg total) by mouth nightly as needed for Insomnia., Disp: 30 tablet, Rfl: 1    venlafaxine  (EFFEXOR-XR) 75 MG 24 hr capsule, Take 1 capsule (75 mg total) by mouth once daily. for 23 days, Disp: 23 capsule, Rfl: 0    Past Medical History:   Diagnosis Date    Allergy     Depression     Hx of colonic polyps 08/04/2014    per colonoscopy report    Hypertension     Migraine     Suicidal thoughts 12/2019    [facility] tx     Thyroid disease     Hypothyroid, MNG       Past Surgical History:   Procedure Laterality Date    COLONOSCOPY      COLONOSCOPY N/A 7/1/2020    Procedure: COLONOSCOPY;  Surgeon: Thiago Lozoya Jr., MD;  Location: [facility];  Service: Endoscopy;  Laterality: N/A;    cystoscope      EPIDURAL STEROID INJECTION INTO LUMBAR SPINE N/A 11/23/2022    Procedure: Injection-steroid-epidural-lumbar L5/S1;  Surgeon: Chandler Pereira MD;  Location: [facility] OR;  Service: Pain Management;  Laterality: N/A;    EPIDURAL STEROID INJECTION INTO LUMBAR SPINE N/A 7/25/2023    Procedure: Injection-steroid-epidural-lumbar L5/S1;  Surgeon: Chandler Pereira MD;  Location: [facility] OR;  Service: Pain Management;  Laterality: N/A;    EPIDURAL STEROID INJECTION INTO LUMBAR SPINE N/A 6/4/2024    Procedure: Injection-steroid-epidural-lumbar  L5/S1;  Surgeon: Chandler Pereira MD;  Location: [facility] OR;  Service: Pain Management;  Laterality: N/A;    HYSTERECTOMY      JAMIE with BSO- age 42    OOPHORECTOMY         Family History   Problem Relation Name Age of Onset    Heart disease Mother          had childhood rheumatic fever    Cancer Mother          uterine cancer    Cervical cancer Mother  28    Atrial fibrillation Father      Macular degeneration Maternal Grandmother      Cancer Maternal Grandmother          uterine cancer    Stroke Maternal Grandmother      Heart disease Maternal Grandmother  50        MI    Stroke Maternal Grandfather      Nephrolithiasis Neg Hx      Glaucoma Neg Hx         Social History     Socioeconomic History    Marital status:     Number of children: 2   Tobacco Use    Smoking status: Former      Current packs/day: 0.00     Average packs/day: 0.3 packs/day for 25.0 years (6.3 ttl pk-yrs)     Types: Cigarettes     Start date: 1990     Quit date: 2015     Years since quittin.0    Smokeless tobacco: Never    Tobacco comments:     had quit x 2   Substance and Sexual Activity    Alcohol use: Yes     Alcohol/week: 2.0 standard drinks of alcohol     Types: 2 Cans of beer per week     Comment: hardly ever    Drug use: No    Sexual activity: Yes     Partners: Male     Birth control/protection: None, See Surgical Hx     Social Determinants of Health     Financial Resource Strain: Low Risk  (2023)    Overall Financial Resource Strain (CARDIA)     Difficulty of Paying Living Expenses: Not hard at all   Food Insecurity: No Food Insecurity (2023)    Hunger Vital Sign     Worried About Running Out of Food in the Last Year: Never true     Ran Out of Food in the Last Year: Never true   Transportation Needs: No Transportation Needs (2023)    PRAPARE - Transportation     Lack of Transportation (Medical): No     Lack of Transportation (Non-Medical): No   Physical Activity: Inactive (2023)    Exercise Vital Sign     Days of Exercise per Week: 0 days     Minutes of Exercise per Session: 0 min   Stress: Stress Concern Present (2023)    [employer] - Occupational Stress Questionnaire     Feeling of Stress : Rather much   Housing Stability: Low Risk  (2023)    Housing Stability Vital Sign     Unable to Pay for Housing in the Last Year: No     Number of Places Lived in the Last Year: 1     Unstable Housing in the Last Year: No       Review of patient's allergies indicates:   Allergen Reactions    Hay fever and allergy relief      Patient states environmental allergies; NKDA       Review of Systems:  Positive for joint pain. Balance  of review of systems is negative.    Physical Exam:  Vitals:    24 1156   BP: (!) 152/70   Pulse: 74   Weight: 88.4 kg  "(194 lb 14.2 oz)   Height: 5' 3" (1.6 m)   PainSc:   5   PainLoc: Back           Body mass index is 34.52 kg/m².    Gen: NAD  Psych: mood appropriate for given condition  HEENT: eyes anicteric   CV: RRR, 2+ radial pulse  HEENT: anicteric   Respiratory: non-labored, no signs of respiratory distress  Abd: non-distended  Skin: warm, dry and intact.  Gait:  No antalgic gait.      Sensory:   Intact and symmetrical to light touch in L1-S1 dermatomes bilaterally.     Motor:           Right Left   L2/3 Iliacus Hip flexion  5  5   L3/4 Qudratus Femoris Knee Extension  5  5   L4/5 Tib Anterior Ankle Dorsiflexion   5  5   L5/S1 Extensor Hallicus Longus Great toe extension  5  5   S1/S2 Gastroc/Soleus Plantar Flexion  5  5        Right Left   Triceps DTR       Biceps DTR       Brachioradialis DTR       Patellar DTR 2+ 2+   Achilles DTR 0+ 2+   Cuevas Absent  Absent                               Imaging:  MRI lumbar spine 11/8/22  FINDINGS:  Alignment: Mild levoconvex curvature of the lumbar spine.     Vertebral column: Vertebral body heights are maintained.  No evidence of an acute fracture or aggressive marrow replacement process. Multilevel disc degeneration, most pronounced at L5-S1 with marked disc space narrowing, degenerative endplate change and marginal osteophyte formation.  Stable probable hemangioma within the L3 vertebral body.     Cord: Normal.  Conus terminates at L1.     Degenerative findings:     T12-L1: No significant disc bulge.  Mild bilateral facet arthropathy.  No neural foraminal or spinal canal stenosis.  L1-L2: No significant disc abnormality.  Mild bilateral facet arthropathy.  There is no neural foraminal stenosis.  There is no spinal canal stenosis.  L2-L3: No significant disc abnormality.  Mild bilateral facet arthropathy.  There is no neural foraminal stenosis.  There is no spinal canal stenosis.  L3-L4: Minimal diffuse disc bulge.  Mild bilateral facet arthropathy and ligamentum flavum thickening.  " Mild left neural foraminal stenosis.  There is no spinal canal stenosis.  L4-L5: Mild diffuse disc bulge with mild broad-based right extraforaminal disc protrusion through an annular fissure.  Moderate bilateral facet arthropathy.  Ligamentum flavum thickening.  Mild bilateral neural foraminal stenosis.  Mild spinal canal stenosis.  L5-S1: Marked disc space narrowing with osteophytic ridging and diffuse disc herniation.  Moderate bilateral facet arthropathy.  Ligamentum flavum thickening.  Moderate left and mild-to-moderate right neural foraminal stenosis.  Mild spinal canal stenosis.     Paraspinal muscles & soft tissues: No significant abnormality.    Labs:  Lab Results   Component Value Date    HGBA1C 5.7 (H) 05/28/2024       Lab Results   Component Value Date    WBC 6.49 05/23/2024    HGB 14.2 05/23/2024    HCT 43.3 05/23/2024    MCV 90 05/23/2024     05/23/2024           Assessment:   Problem List Items Addressed This Visit    None  Visit Diagnoses       Lumbar spondylosis    -  Primary    Lumbar radiculopathy        Chronic bilateral low back pain with right-sided sciatica        Vertebrogenic low back pain                          59 y.o. year old female with PMH bipolar, HTN, hypothyroidism who presents with back pain.  She is had chronic lower back pain for many years it has been gradually worsening.  Today her pain is 8/10, constant, aching, sharp with radiating pain down the back of the right leg stopping at the knee.  She denies any numbness or weakness.    6/18/2024: Delaney Noel returns to the office for follow up.  She is s/p L5/S1 ANUJA on 06/04/2024 with 60% relief.  Previous pain in her legs has resolved, her remaining pain is across her lower back, 6/10, worsened with physical activities, improved with rest.  She denies any new numbness, weakness or any new changes to her bowel or bladder function.  No significant relief gabapentin, Mobic and Robaxin.      - on exam she is full strength in  her lower extremities, increased pain with bilateral axial facet loading  - She is s/p L5/S1 ANUJA on 06/04/2024 with 60% relief.   - I independently reviewed her lumbar MRI is consistent with multilevel bilateral facet arthropathy worse at L4-5 and L5-S1 as well as diffuse central disc herniation at L5-S1 causing some mild central canal narrowing and bilateral foraminal narrowing.  Modic type 2 endplate changes at L5/S1.  - previous lumbar radicular pain has resolved with lumbar epidural.  I believe her remaining pain is axial facet mediated pain.  This pain is limiting her mobility interfering with her ADLs and quality of life.  - to address his pain will schedule her for bilateral L4/5 and L5/S1 diagnostic medial branch blocks.  If successful we will proceed with radiofrequency ablation.  - follow-up 4 weeks post procedure or sooner if needed.  If she fails to get relief with diagnostic medial branch blocks will update her lumbar MRI.  She does have Modic endplate changes that could be contributing to her pain as well.      : Not applicable    This note was completed with dictation software and grammatical errors may exist.

## 2024-06-19 NOTE — TELEPHONE ENCOUNTER
Spoke with pt, states she will call the office at the end of July when she is finished with her summer vacations to schedule MBB procedure with Dr. Pereira.

## 2024-06-27 DIAGNOSIS — F41.1 GAD (GENERALIZED ANXIETY DISORDER): ICD-10-CM

## 2024-06-27 DIAGNOSIS — F43.12 CHRONIC POST-TRAUMATIC STRESS DISORDER (PTSD): ICD-10-CM

## 2024-06-27 DIAGNOSIS — F33.1 MAJOR DEPRESSIVE DISORDER, RECURRENT EPISODE, MODERATE: ICD-10-CM

## 2024-06-27 RX ORDER — VENLAFAXINE HYDROCHLORIDE 75 MG/1
75 CAPSULE, EXTENDED RELEASE ORAL DAILY
Qty: 30 CAPSULE | Refills: 1 | Status: SHIPPED | OUTPATIENT
Start: 2024-06-27 | End: 2024-08-26

## 2024-06-29 DIAGNOSIS — F51.02 INSOMNIA DUE TO PSYCHOLOGICAL STRESS: ICD-10-CM

## 2024-07-01 RX ORDER — TRAZODONE HYDROCHLORIDE 100 MG/1
100 TABLET ORAL NIGHTLY PRN
Qty: 90 TABLET | Refills: 0 | Status: SHIPPED | OUTPATIENT
Start: 2024-07-01 | End: 2024-09-29

## 2024-07-02 ENCOUNTER — TELEPHONE (OUTPATIENT)
Dept: PSYCHIATRY | Facility: CLINIC | Age: 59
End: 2024-07-02
Payer: COMMERCIAL

## 2024-07-02 ENCOUNTER — PATIENT MESSAGE (OUTPATIENT)
Dept: PSYCHIATRY | Facility: CLINIC | Age: 59
End: 2024-07-02
Payer: COMMERCIAL

## 2024-07-04 ENCOUNTER — PATIENT MESSAGE (OUTPATIENT)
Dept: NEUROLOGY | Facility: CLINIC | Age: 59
End: 2024-07-04
Payer: COMMERCIAL

## 2024-07-04 DIAGNOSIS — G43.109 MIGRAINE WITH AURA AND WITHOUT STATUS MIGRAINOSUS, NOT INTRACTABLE: Primary | ICD-10-CM

## 2024-07-04 DIAGNOSIS — F31.81 BIPOLAR 2 DISORDER: ICD-10-CM

## 2024-07-05 RX ORDER — CARIPRAZINE 3 MG/1
3 CAPSULE, GELATIN COATED ORAL DAILY
Qty: 30 CAPSULE | Refills: 1 | Status: SHIPPED | OUTPATIENT
Start: 2024-07-05 | End: 2024-09-03

## 2024-07-08 ENCOUNTER — OFFICE VISIT (OUTPATIENT)
Dept: PSYCHIATRY | Facility: CLINIC | Age: 59
End: 2024-07-08
Payer: COMMERCIAL

## 2024-07-08 DIAGNOSIS — F51.02 INSOMNIA DUE TO PSYCHOLOGICAL STRESS: ICD-10-CM

## 2024-07-08 DIAGNOSIS — F43.12 CHRONIC POST-TRAUMATIC STRESS DISORDER (PTSD): ICD-10-CM

## 2024-07-08 DIAGNOSIS — F31.81 BIPOLAR 2 DISORDER: Primary | ICD-10-CM

## 2024-07-08 DIAGNOSIS — F33.1 MAJOR DEPRESSIVE DISORDER, RECURRENT EPISODE, MODERATE: ICD-10-CM

## 2024-07-08 DIAGNOSIS — F41.1 GAD (GENERALIZED ANXIETY DISORDER): ICD-10-CM

## 2024-07-08 PROCEDURE — 1159F MED LIST DOCD IN RCRD: CPT | Mod: CPTII,95,,

## 2024-07-08 PROCEDURE — 1160F RVW MEDS BY RX/DR IN RCRD: CPT | Mod: CPTII,95,,

## 2024-07-08 PROCEDURE — 3044F HG A1C LEVEL LT 7.0%: CPT | Mod: CPTII,95,,

## 2024-07-08 PROCEDURE — 99214 OFFICE O/P EST MOD 30 MIN: CPT | Mod: 95,,,

## 2024-07-08 RX ORDER — KETOROLAC TROMETHAMINE 10 MG/1
TABLET, FILM COATED ORAL
Qty: 20 TABLET | Refills: 2 | Status: SHIPPED | OUTPATIENT
Start: 2024-07-08
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
## 2024-07-09 ENCOUNTER — OFFICE VISIT (OUTPATIENT)
Dept: PSYCHIATRY | Facility: CLINIC | Age: 59
End: 2024-07-09
Payer: COMMERCIAL

## 2024-07-09 DIAGNOSIS — F33.1 MAJOR DEPRESSIVE DISORDER, RECURRENT EPISODE, MODERATE: ICD-10-CM

## 2024-07-09 DIAGNOSIS — F43.12 CHRONIC POST-TRAUMATIC STRESS DISORDER (PTSD): ICD-10-CM

## 2024-07-09 DIAGNOSIS — F31.81 BIPOLAR 2 DISORDER: Primary | ICD-10-CM

## 2024-07-09 DIAGNOSIS — F43.21 COMPLICATED GRIEF: ICD-10-CM

## 2024-07-09 PROCEDURE — 90791 PSYCH DIAGNOSTIC EVALUATION: CPT | Mod: S$GLB,,, | Performed by: SOCIAL WORKER

## 2024-07-09 PROCEDURE — 3044F HG A1C LEVEL LT 7.0%: CPT | Mod: CPTII,S$GLB,, | Performed by: SOCIAL WORKER

## 2024-07-09 PROCEDURE — 99999 PR PBB SHADOW E&M-EST. PATIENT-LVL I: CPT | Mod: PBBFAC,,, | Performed by: SOCIAL WORKER
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
## 2024-07-16 ENCOUNTER — PATIENT MESSAGE (OUTPATIENT)
Dept: PSYCHIATRY | Facility: CLINIC | Age: 59
End: 2024-07-16
Payer: COMMERCIAL

## 2024-07-18 ENCOUNTER — OFFICE VISIT (OUTPATIENT)
Dept: PSYCHIATRY | Facility: CLINIC | Age: 59
End: 2024-07-18
Payer: COMMERCIAL

## 2024-07-18 DIAGNOSIS — F31.81 BIPOLAR 2 DISORDER: ICD-10-CM

## 2024-07-18 DIAGNOSIS — F43.21 COMPLICATED GRIEF: ICD-10-CM

## 2024-07-18 DIAGNOSIS — F43.12 CHRONIC POST-TRAUMATIC STRESS DISORDER (PTSD): Primary | ICD-10-CM

## 2024-07-18 DIAGNOSIS — F41.1 GAD (GENERALIZED ANXIETY DISORDER): ICD-10-CM

## 2024-07-18 PROCEDURE — 90834 PSYTX W PT 45 MINUTES: CPT | Mod: S$GLB,,, | Performed by: SOCIAL WORKER

## 2024-07-18 PROCEDURE — 3044F HG A1C LEVEL LT 7.0%: CPT | Mod: CPTII,S$GLB,, | Performed by: SOCIAL WORKER

## 2024-07-18 NOTE — PROGRESS NOTES
Individual Psychotherapy (PhD/LCSW)    07/18/2024    Interim Events/Subjective Report/Content of Current Session:  follow-up appointment.    Pt is a 59 y.o. female with past psychiatric hx of  bipolar, anxiety, grief who presents for follow-up treatment.  Pt goals are to est new coping skills, getting out of the home more, exercise/movement during the day, process relationship with dtr. Discussed this further in follow up session today.  Patient reported that she has been focusing on coping skills.  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    Patient stated she also began a grief check list.  Provided multiple experiences of loss that she was able to identify.  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  -senior care was lost because of going on disability before full senior care was able to be achieved  -not being able to be active due to physical limitations  -loss of mobility  -changes of relationship with daughter  -no access 2 grandchildren at times  -no sex life  -not being able to spend the night with grandkids like her other set of grand parents get to do  -grandparents dying  -cats dying  - Not being able to get another animal due to patient's spouse request  -her children not being able to grew up with grandparents  -absent father  -mother dying when patient was 20 years old    Patient stated she would like to begin reviewing each grief in sessions to focus on processing them through.   provided psychoeducation regarding grief process and grief therapy.  Patient requested to begin speaking about her mother's passing.  It became apparent that patient was never given full end of life education during this time and what patient perceives to be as mother suffering was a normal end of life  process when patient gave story today in session.   provided end of life education.  Patient stated she was able to become more comforted by this awareness.    Current symptoms:  Depression: dysphoric mood.  Anxiety: excessive worrying.  Sleep: non-restful sleep.  Katarina:  racing thoughts or rumination.  Psychosis: denies .  Therapeutic Intervention/Techniques: behavior modification, insight oriented, interactive, and supportive; relevant to diagnosis, patient responds to this modality    Risk Parameters:  Patient reports no suicidal ideation  Patient reports no homicidal ideation  Patient reports no self-injurious behavior  Patient reports no violent behavior    Diagnosis:   1. Chronic post-traumatic stress disorder (PTSD)        2. Bipolar 2 disorder        3. Complicated grief        4. HUNTER (generalized anxiety disorder)              Return to Clinic: as scheduled  Counseling time: 45  -Call to report any worsening of symptoms or problems associated with medication.  - Pt instructed to go to ER if thoughts of harming self or others arise.   -Supportive therapy and psychoeducation provided  -Pt instructed to call clinic, 911 or go to nearest emergency room if sxs worsen or pt is in crisis. The pt expresses understanding.   Each patient to whom he or she provides medical services by telemedicine is:  (1) informed of the relationship between the physician and patient and the respective role of any other health care provider with respect to management of the patient; and (2) notified that he or she may decline to receive medical services by telemedicine and may withdraw from such care at any time.

## 2024-07-20 DIAGNOSIS — F43.12 CHRONIC POST-TRAUMATIC STRESS DISORDER (PTSD): ICD-10-CM

## 2024-07-20 DIAGNOSIS — F41.1 GAD (GENERALIZED ANXIETY DISORDER): ICD-10-CM

## 2024-07-20 DIAGNOSIS — F33.1 MAJOR DEPRESSIVE DISORDER, RECURRENT EPISODE, MODERATE: ICD-10-CM

## 2024-07-22 RX ORDER — VENLAFAXINE HYDROCHLORIDE 75 MG/1
75 CAPSULE, EXTENDED RELEASE ORAL DAILY
Qty: 90 CAPSULE | Refills: 0 | Status: SHIPPED | OUTPATIENT
Start: 2024-07-22 | End: 2024-10-20

## 2024-08-22 ENCOUNTER — OFFICE VISIT (OUTPATIENT)
Dept: PAIN MEDICINE | Facility: CLINIC | Age: 59
End: 2024-08-22
Payer: COMMERCIAL

## 2024-08-22 ENCOUNTER — TELEPHONE (OUTPATIENT)
Dept: PAIN MEDICINE | Facility: CLINIC | Age: 59
End: 2024-08-22

## 2024-08-22 VITALS
HEIGHT: 63 IN | BODY MASS INDEX: 33.63 KG/M2 | WEIGHT: 189.81 LBS | SYSTOLIC BLOOD PRESSURE: 132 MMHG | HEART RATE: 91 BPM | DIASTOLIC BLOOD PRESSURE: 80 MMHG

## 2024-08-22 DIAGNOSIS — G89.29 CHRONIC BILATERAL LOW BACK PAIN WITH RIGHT-SIDED SCIATICA: ICD-10-CM

## 2024-08-22 DIAGNOSIS — M54.41 CHRONIC BILATERAL LOW BACK PAIN WITH RIGHT-SIDED SCIATICA: ICD-10-CM

## 2024-08-22 DIAGNOSIS — M47.816 LUMBAR SPONDYLOSIS: Primary | ICD-10-CM

## 2024-08-22 DIAGNOSIS — M54.16 LUMBAR RADICULOPATHY: ICD-10-CM

## 2024-08-22 DIAGNOSIS — M54.51 VERTEBROGENIC LOW BACK PAIN: ICD-10-CM

## 2024-08-22 PROCEDURE — 3079F DIAST BP 80-89 MM HG: CPT | Mod: CPTII,S$GLB,,

## 2024-08-22 PROCEDURE — 3044F HG A1C LEVEL LT 7.0%: CPT | Mod: CPTII,S$GLB,,

## 2024-08-22 PROCEDURE — 99999 PR PBB SHADOW E&M-EST. PATIENT-LVL IV: CPT | Mod: PBBFAC,,,

## 2024-08-22 PROCEDURE — 99214 OFFICE O/P EST MOD 30 MIN: CPT | Mod: S$GLB,,,

## 2024-08-22 PROCEDURE — 3008F BODY MASS INDEX DOCD: CPT | Mod: CPTII,S$GLB,,

## 2024-08-22 PROCEDURE — 3075F SYST BP GE 130 - 139MM HG: CPT | Mod: CPTII,S$GLB,,

## 2024-08-22 PROCEDURE — 1159F MED LIST DOCD IN RCRD: CPT | Mod: CPTII,S$GLB,,

## 2024-08-22 RX ORDER — SODIUM CHLORIDE, SODIUM LACTATE, POTASSIUM CHLORIDE, CALCIUM CHLORIDE 600; 310; 30; 20 MG/100ML; MG/100ML; MG/100ML; MG/100ML
INJECTION, SOLUTION INTRAVENOUS CONTINUOUS
OUTPATIENT
Start: 2024-08-22

## 2024-08-22 NOTE — H&P (VIEW-ONLY)
[facility] Follow Up Evaluation      Referred by: No ref. provider found    PCP: Dr. Swartz    CC:   Chief Complaint   Patient presents with    Follow-up          8/22/2024    11:26 AM 5/16/2024    11:16 AM   Last 3 PDI Scores   Pain Disability Index (PDI) 38 28     Interval HPI 8/22/2024: Delaney Noel returns to the office for follow up.  Today she is reporting worsening lower back pain, 8/10, located across her back, worsened with physical activities, no significant relief with rest.  No pain radiating in her legs.  No numbness, weakness or any new changes to her bowel bladder function.  She has been taking gabapentin, ibuprofen, Robaxin without relief.      HPI:   Delaney Noel is a 59 y.o. female who presents with back pain.  She is had chronic lower back pain for many years it has been gradually worsening.  Today her pain is 8/10, constant, aching, sharp with radiating pain down the back of the right leg stopping at the knee.  She denies any numbness or weakness.      Pain Intervention History:  - s/p L5/S1 ANUJA on 11/23/2022 with initially 100% relief and now 30% relief.  - s/p L5/S1 ANUJA on 07/25/2023 with 85% relief.  - s/p L5/S1 ANUJA on 06/04/2024 with 60% relief.     Past Spine Surgical History:      Past and current medications:  Antineuropathics:  NSAIDs: mobic, ibuprofen, toradol  Physical therapy: yes, currently   Antidepressants: gabapentin  Muscle relaxers: robaxin  Opioids:  Antiplatelets/Anticoagulants: aspirin    History:    Current Outpatient Medications:     albuterol (PROVENTIL/VENTOLIN HFA) 90 mcg/actuation inhaler, Inhale 2 puffs into the lungs every 6 (six) hours as needed for Wheezing. Rescue, Disp: 18 g, Rfl: 2    aspirin (ECOTRIN) 81 MG EC tablet, Take 1 tablet (81 mg total) by mouth once daily., Disp: 90 tablet, Rfl: 3    atenoloL (TENORMIN) 50 MG tablet, Take 1 tablet (50 mg total) by mouth once daily., Disp: 90 tablet, Rfl: 3    cetirizine (ZYRTEC) 10 MG tablet, Take  10 mg by mouth once daily. Take one(1) tab by mouth daily, Disp: , Rfl:     clonazePAM (KLONOPIN) 1 MG tablet, Take 1 tablet (1 mg total) by mouth daily as needed for Anxiety., Disp: 30 tablet, Rfl: 5    dicyclomine (BENTYL) 10 MG capsule, TAKE 1 CAPSULE BY MOUTH BEFORE MEALS AND AT BEDTIME AS NEEDED (ABDOMINAL PAIN DIARRHEA)., Disp: 360 capsule, Rfl: 1    EMGALITY SYRINGE 120 mg/mL Syrg, Inject into the skin., Disp: , Rfl:     fluticasone propionate (FLONASE) 50 mcg/actuation nasal spray, 2 sprays (100 mcg total) by Each Nostril route once daily., Disp: 16 g, Rfl: 6    gabapentin (NEURONTIN) 300 MG capsule, Take 1 capsule (300 mg total) by mouth every evening., Disp: 90 capsule, Rfl: 3    ibuprofen (ADVIL,MOTRIN) 800 MG tablet, Take 800 mg by mouth 3 (three) times daily as needed. , Disp: , Rfl:     ketorolac (TORADOL) 10 mg tablet, Take 2 tabs (20mg) at the onset of a headache, if headache persists you may take 1 tab every 4 to 6 hours as needed. Do not exceed 40 mg per day., Disp: 20 tablet, Rfl: 2    lamoTRIgine (LAMICTAL) 25 MG tablet, Take 3 tablets (75 mg total) by mouth once daily. Take three tabs(75mg) by mouth daily, Disp: 270 tablet, Rfl: 3    levothyroxine (SYNTHROID) 75 MCG tablet, Take 1 tablet (75 mcg total) by mouth before breakfast., Disp: 90 tablet, Rfl: 3    methocarbamoL (ROBAXIN) 750 MG Tab, Take 1 tablet (750 mg total) by mouth nightly as needed., Disp: 90 tablet, Rfl: 2    rosuvastatin (CRESTOR) 10 MG tablet, Take 1 tablet (10 mg total) by mouth once daily., Disp: 90 tablet, Rfl: 1    sumatriptan (IMITREX) 100 MG tablet, Take at onset of migraine, can repeat in 2 hrs if needed.  No more than twice per day or 3 days/wk., Disp: 18 tablet, Rfl: 5    traZODone (DESYREL) 100 MG tablet, Take 1 tablet (100 mg total) by mouth nightly as needed for Insomnia., Disp: 90 tablet, Rfl: 0    venlafaxine (EFFEXOR-XR) 75 MG 24 hr capsule, Take 1 capsule (75 mg total) by mouth once daily., Disp: 90 capsule,  Rfl: 0    Past Medical History:   Diagnosis Date    Allergy     Depression     Hx of colonic polyps 08/04/2014    per colonoscopy report    Hypertension     Migraine     Suicidal thoughts 12/2019    [facility] tx     Thyroid disease     Hypothyroid, MNG       Past Surgical History:   Procedure Laterality Date    COLONOSCOPY      COLONOSCOPY N/A 7/1/2020    Procedure: COLONOSCOPY;  Surgeon: Thiago Lozoya Jr., MD;  Location: [facility] ENDO;  Service: Endoscopy;  Laterality: N/A;    cystoscope      EPIDURAL STEROID INJECTION INTO LUMBAR SPINE N/A 11/23/2022    Procedure: Injection-steroid-epidural-lumbar L5/S1;  Surgeon: Chandler Pereira MD;  Location: [facility] OR;  Service: Pain Management;  Laterality: N/A;    EPIDURAL STEROID INJECTION INTO LUMBAR SPINE N/A 7/25/2023    Procedure: Injection-steroid-epidural-lumbar L5/S1;  Surgeon: Chandler Pereira MD;  Location: [facility] OR;  Service: Pain Management;  Laterality: N/A;    EPIDURAL STEROID INJECTION INTO LUMBAR SPINE N/A 6/4/2024    Procedure: Injection-steroid-epidural-lumbar  L5/S1;  Surgeon: Chandler Pereira MD;  Location: [facility] OR;  Service: Pain Management;  Laterality: N/A;    HYSTERECTOMY      JAMIE with BSO- age 42    OOPHORECTOMY         Family History   Problem Relation Name Age of Onset    Heart disease Mother          had childhood rheumatic fever    Cancer Mother          uterine cancer    Cervical cancer Mother  28    Atrial fibrillation Father      Macular degeneration Maternal Grandmother      Cancer Maternal Grandmother          uterine cancer    Stroke Maternal Grandmother      Heart disease Maternal Grandmother  50        MI    Stroke Maternal Grandfather      Nephrolithiasis Neg Hx      Glaucoma Neg Hx         Social History     Socioeconomic History    Marital status:     Number of children: 2   Tobacco Use    Smoking status: Former     Current packs/day: 0.00     Average packs/day: 0.3 packs/day for 25.0 years (6.3 ttl pk-yrs)     Types:  "Cigarettes     Start date: 1990     Quit date: 2015     Years since quittin.2    Smokeless tobacco: Never    Tobacco comments:     had quit x 2   Substance and Sexual Activity    Alcohol use: Yes     Alcohol/week: 2.0 standard drinks of alcohol     Types: 2 Cans of beer per week     Comment: hardly ever    Drug use: No    Sexual activity: Yes     Partners: Male     Birth control/protection: None, See Surgical Hx     Social Determinants of Health     Financial Resource Strain: Low Risk  (2023)    Overall Financial Resource Strain (CARDIA)     Difficulty of Paying Living Expenses: Not hard at all   Food Insecurity: No Food Insecurity (2023)    Hunger Vital Sign     Worried About Running Out of Food in the Last Year: Never true     Ran Out of Food in the Last Year: Never true   Transportation Needs: No Transportation Needs (2023)    PRAPARE - Transportation     Lack of Transportation (Medical): No     Lack of Transportation (Non-Medical): No   Physical Activity: Inactive (2023)    Exercise Vital Sign     Days of Exercise per Week: 0 days     Minutes of Exercise per Session: 0 min   Stress: Stress Concern Present (2023)    [employer] - Occupational Stress Questionnaire     Feeling of Stress : Rather much   Housing Stability: Low Risk  (2023)    Housing Stability Vital Sign     Unable to Pay for Housing in the Last Year: No     Number of Places Lived in the Last Year: 1     Unstable Housing in the Last Year: No       Review of patient's allergies indicates:   Allergen Reactions    Hay fever and allergy relief      Patient states environmental allergies; NKDA       Review of Systems:  Positive for joint pain. Balance  of review of systems is negative.    Physical Exam:  Vitals:    24 1128   BP: 132/80   Pulse: 91   Weight: 86.1 kg (189 lb 13.1 oz)   Height: 5' 3" (1.6 m)   PainSc:   8   PainLoc: Back             Body mass index is 33.62 " kg/m².    Gen: NAD  Psych: mood appropriate for given condition  HEENT: eyes anicteric   CV: RRR, 2+ radial pulse  HEENT: anicteric   Respiratory: non-labored, no signs of respiratory distress  Abd: non-distended  Skin: warm, dry and intact.  Gait:  No antalgic gait.      Sensory:   Intact and symmetrical to light touch in L1-S1 dermatomes bilaterally.     Motor:           Right Left   L2/3 Iliacus Hip flexion  5  5   L3/4 Qudratus Femoris Knee Extension  5  5   L4/5 Tib Anterior Ankle Dorsiflexion   5  5   L5/S1 Extensor Hallicus Longus Great toe extension  5  5   S1/S2 Gastroc/Soleus Plantar Flexion  5  5        Right Left   Triceps DTR       Biceps DTR       Brachioradialis DTR       Patellar DTR 2+ 2+   Achilles DTR 0+ 2+   Cuevas Absent  Absent                       Provocative:  Increased pain with bilateral axial facet loading        Imaging:  MRI lumbar spine 11/8/22  FINDINGS:  Alignment: Mild levoconvex curvature of the lumbar spine.     Vertebral column: Vertebral body heights are maintained.  No evidence of an acute fracture or aggressive marrow replacement process. Multilevel disc degeneration, most pronounced at L5-S1 with marked disc space narrowing, degenerative endplate change and marginal osteophyte formation.  Stable probable hemangioma within the L3 vertebral body.     Cord: Normal.  Conus terminates at L1.     Degenerative findings:     T12-L1: No significant disc bulge.  Mild bilateral facet arthropathy.  No neural foraminal or spinal canal stenosis.  L1-L2: No significant disc abnormality.  Mild bilateral facet arthropathy.  There is no neural foraminal stenosis.  There is no spinal canal stenosis.  L2-L3: No significant disc abnormality.  Mild bilateral facet arthropathy.  There is no neural foraminal stenosis.  There is no spinal canal stenosis.  L3-L4: Minimal diffuse disc bulge.  Mild bilateral facet arthropathy and ligamentum flavum thickening.  Mild left neural foraminal stenosis.   There is no spinal canal stenosis.  L4-L5: Mild diffuse disc bulge with mild broad-based right extraforaminal disc protrusion through an annular fissure.  Moderate bilateral facet arthropathy.  Ligamentum flavum thickening.  Mild bilateral neural foraminal stenosis.  Mild spinal canal stenosis.  L5-S1: Marked disc space narrowing with osteophytic ridging and diffuse disc herniation.  Moderate bilateral facet arthropathy.  Ligamentum flavum thickening.  Moderate left and mild-to-moderate right neural foraminal stenosis.  Mild spinal canal stenosis.     Paraspinal muscles & soft tissues: No significant abnormality.    Labs:  Lab Results   Component Value Date    HGBA1C 5.7 (H) 05/28/2024       Lab Results   Component Value Date    WBC 6.49 05/23/2024    HGB 14.2 05/23/2024    HCT 43.3 05/23/2024    MCV 90 05/23/2024     05/23/2024           Assessment:   Problem List Items Addressed This Visit    None  Visit Diagnoses       Lumbar spondylosis    -  Primary    Lumbar radiculopathy        Vertebrogenic low back pain        Chronic bilateral low back pain with right-sided sciatica                    59 y.o. year old female with PMH bipolar, HTN, hypothyroidism who presents with back pain.  She is had chronic lower back pain for many years it has been gradually worsening.  Today her pain is 8/10, constant, aching, sharp with radiating pain down the back of the right leg stopping at the knee.  She denies any numbness or weakness.    8/22/2024: Delaney Noel returns to the office for follow up.  Today she is reporting worsening lower back pain, 8/10, located across her back, worsened with physical activities, no significant relief with rest.  No pain radiating in her legs.  No numbness, weakness or any new changes to her bowel bladder function.  She has been taking gabapentin, ibuprofen, Robaxin without relief.    - on exam she is full strength in her lower extremities, increased pain with bilateral axial facet  loading.   - I independently reviewed her lumbar MRI is consistent with multilevel bilateral facet arthropathy worse at L4-5 and L5-S1 as well as diffuse central disc herniation at L5-S1 causing some mild central canal narrowing and bilateral foraminal narrowing.  Modic type 2 endplate changes at L5/S1.  - I believe she is having worsening lumbar axial facet mediated pain.  This pain is limiting her mobility interfering with her ADLs and quality of life.  No significant relief with gabapentin, ibuprofen, Robaxin.  Her pain is too severe to participate in formal physical therapy.  No relief with at-home PT directed exercises.  - for this pain, we will schedule her for bilateral L4/5 and L5/S1 diagnostic medial branch blocks.  If successful we will repeat the blocks prior to proceeding with radiofrequency ablation.  - follow up 4 weeks post procedure or sooner if needed.  If she fails to get relief with this will update her lumbar MRI.  She does have Modic endplate changes and may be a candidate for basal vertebral nerve ablation.        : Not applicable    This note was completed with dictation software and grammatical errors may exist.

## 2024-08-22 NOTE — PROGRESS NOTES
[facility] Follow Up Evaluation      Referred by: No ref. provider found    PCP: Dr. Swartz    CC:   Chief Complaint   Patient presents with    Follow-up          8/22/2024    11:26 AM 5/16/2024    11:16 AM   Last 3 PDI Scores   Pain Disability Index (PDI) 38 28     Interval HPI 8/22/2024: Delaney Noel returns to the office for follow up.  Today she is reporting worsening lower back pain, 8/10, located across her back, worsened with physical activities, no significant relief with rest.  No pain radiating in her legs.  No numbness, weakness or any new changes to her bowel bladder function.  She has been taking gabapentin, ibuprofen, Robaxin without relief.      HPI:   Delaney Noel is a 59 y.o. female who presents with back pain.  She is had chronic lower back pain for many years it has been gradually worsening.  Today her pain is 8/10, constant, aching, sharp with radiating pain down the back of the right leg stopping at the knee.  She denies any numbness or weakness.      Pain Intervention History:  - s/p L5/S1 ANUJA on 11/23/2022 with initially 100% relief and now 30% relief.  - s/p L5/S1 ANUJA on 07/25/2023 with 85% relief.  - s/p L5/S1 ANUJA on 06/04/2024 with 60% relief.     Past Spine Surgical History:      Past and current medications:  Antineuropathics:  NSAIDs: mobic, ibuprofen, toradol  Physical therapy: yes, currently   Antidepressants: gabapentin  Muscle relaxers: robaxin  Opioids:  Antiplatelets/Anticoagulants: aspirin    History:    Current Outpatient Medications:     albuterol (PROVENTIL/VENTOLIN HFA) 90 mcg/actuation inhaler, Inhale 2 puffs into the lungs every 6 (six) hours as needed for Wheezing. Rescue, Disp: 18 g, Rfl: 2    aspirin (ECOTRIN) 81 MG EC tablet, Take 1 tablet (81 mg total) by mouth once daily., Disp: 90 tablet, Rfl: 3    atenoloL (TENORMIN) 50 MG tablet, Take 1 tablet (50 mg total) by mouth once daily., Disp: 90 tablet, Rfl: 3    cetirizine (ZYRTEC) 10 MG tablet, Take  10 mg by mouth once daily. Take one(1) tab by mouth daily, Disp: , Rfl:     clonazePAM (KLONOPIN) 1 MG tablet, Take 1 tablet (1 mg total) by mouth daily as needed for Anxiety., Disp: 30 tablet, Rfl: 5    dicyclomine (BENTYL) 10 MG capsule, TAKE 1 CAPSULE BY MOUTH BEFORE MEALS AND AT BEDTIME AS NEEDED (ABDOMINAL PAIN DIARRHEA)., Disp: 360 capsule, Rfl: 1    EMGALITY SYRINGE 120 mg/mL Syrg, Inject into the skin., Disp: , Rfl:     fluticasone propionate (FLONASE) 50 mcg/actuation nasal spray, 2 sprays (100 mcg total) by Each Nostril route once daily., Disp: 16 g, Rfl: 6    gabapentin (NEURONTIN) 300 MG capsule, Take 1 capsule (300 mg total) by mouth every evening., Disp: 90 capsule, Rfl: 3    ibuprofen (ADVIL,MOTRIN) 800 MG tablet, Take 800 mg by mouth 3 (three) times daily as needed. , Disp: , Rfl:     ketorolac (TORADOL) 10 mg tablet, Take 2 tabs (20mg) at the onset of a headache, if headache persists you may take 1 tab every 4 to 6 hours as needed. Do not exceed 40 mg per day., Disp: 20 tablet, Rfl: 2    lamoTRIgine (LAMICTAL) 25 MG tablet, Take 3 tablets (75 mg total) by mouth once daily. Take three tabs(75mg) by mouth daily, Disp: 270 tablet, Rfl: 3    levothyroxine (SYNTHROID) 75 MCG tablet, Take 1 tablet (75 mcg total) by mouth before breakfast., Disp: 90 tablet, Rfl: 3    methocarbamoL (ROBAXIN) 750 MG Tab, Take 1 tablet (750 mg total) by mouth nightly as needed., Disp: 90 tablet, Rfl: 2    rosuvastatin (CRESTOR) 10 MG tablet, Take 1 tablet (10 mg total) by mouth once daily., Disp: 90 tablet, Rfl: 1    sumatriptan (IMITREX) 100 MG tablet, Take at onset of migraine, can repeat in 2 hrs if needed.  No more than twice per day or 3 days/wk., Disp: 18 tablet, Rfl: 5    traZODone (DESYREL) 100 MG tablet, Take 1 tablet (100 mg total) by mouth nightly as needed for Insomnia., Disp: 90 tablet, Rfl: 0    venlafaxine (EFFEXOR-XR) 75 MG 24 hr capsule, Take 1 capsule (75 mg total) by mouth once daily., Disp: 90 capsule,  Rfl: 0    Past Medical History:   Diagnosis Date    Allergy     Depression     Hx of colonic polyps 08/04/2014    per colonoscopy report    Hypertension     Migraine     Suicidal thoughts 12/2019    [facility] tx     Thyroid disease     Hypothyroid, MNG       Past Surgical History:   Procedure Laterality Date    COLONOSCOPY      COLONOSCOPY N/A 7/1/2020    Procedure: COLONOSCOPY;  Surgeon: Thiago Lozoya Jr., MD;  Location: [facility] ENDO;  Service: Endoscopy;  Laterality: N/A;    cystoscope      EPIDURAL STEROID INJECTION INTO LUMBAR SPINE N/A 11/23/2022    Procedure: Injection-steroid-epidural-lumbar L5/S1;  Surgeon: Chandler Pereira MD;  Location: [facility] OR;  Service: Pain Management;  Laterality: N/A;    EPIDURAL STEROID INJECTION INTO LUMBAR SPINE N/A 7/25/2023    Procedure: Injection-steroid-epidural-lumbar L5/S1;  Surgeon: Chandler Pereira MD;  Location: [facility] OR;  Service: Pain Management;  Laterality: N/A;    EPIDURAL STEROID INJECTION INTO LUMBAR SPINE N/A 6/4/2024    Procedure: Injection-steroid-epidural-lumbar  L5/S1;  Surgeon: Chandler Pereira MD;  Location: [facility] OR;  Service: Pain Management;  Laterality: N/A;    HYSTERECTOMY      JAMIE with BSO- age 42    OOPHORECTOMY         Family History   Problem Relation Name Age of Onset    Heart disease Mother          had childhood rheumatic fever    Cancer Mother          uterine cancer    Cervical cancer Mother  28    Atrial fibrillation Father      Macular degeneration Maternal Grandmother      Cancer Maternal Grandmother          uterine cancer    Stroke Maternal Grandmother      Heart disease Maternal Grandmother  50        MI    Stroke Maternal Grandfather      Nephrolithiasis Neg Hx      Glaucoma Neg Hx         Social History     Socioeconomic History    Marital status:     Number of children: 2   Tobacco Use    Smoking status: Former     Current packs/day: 0.00     Average packs/day: 0.3 packs/day for 25.0 years (6.3 ttl pk-yrs)     Types:  "Cigarettes     Start date: 1990     Quit date: 2015     Years since quittin.2    Smokeless tobacco: Never    Tobacco comments:     had quit x 2   Substance and Sexual Activity    Alcohol use: Yes     Alcohol/week: 2.0 standard drinks of alcohol     Types: 2 Cans of beer per week     Comment: hardly ever    Drug use: No    Sexual activity: Yes     Partners: Male     Birth control/protection: None, See Surgical Hx     Social Determinants of Health     Financial Resource Strain: Low Risk  (2023)    Overall Financial Resource Strain (CARDIA)     Difficulty of Paying Living Expenses: Not hard at all   Food Insecurity: No Food Insecurity (2023)    Hunger Vital Sign     Worried About Running Out of Food in the Last Year: Never true     Ran Out of Food in the Last Year: Never true   Transportation Needs: No Transportation Needs (2023)    PRAPARE - Transportation     Lack of Transportation (Medical): No     Lack of Transportation (Non-Medical): No   Physical Activity: Inactive (2023)    Exercise Vital Sign     Days of Exercise per Week: 0 days     Minutes of Exercise per Session: 0 min   Stress: Stress Concern Present (2023)    [employer] - Occupational Stress Questionnaire     Feeling of Stress : Rather much   Housing Stability: Low Risk  (2023)    Housing Stability Vital Sign     Unable to Pay for Housing in the Last Year: No     Number of Places Lived in the Last Year: 1     Unstable Housing in the Last Year: No       Review of patient's allergies indicates:   Allergen Reactions    Hay fever and allergy relief      Patient states environmental allergies; NKDA       Review of Systems:  Positive for joint pain. Balance  of review of systems is negative.    Physical Exam:  Vitals:    24 1128   BP: 132/80   Pulse: 91   Weight: 86.1 kg (189 lb 13.1 oz)   Height: 5' 3" (1.6 m)   PainSc:   8   PainLoc: Back             Body mass index is 33.62 " kg/m².    Gen: NAD  Psych: mood appropriate for given condition  HEENT: eyes anicteric   CV: RRR, 2+ radial pulse  HEENT: anicteric   Respiratory: non-labored, no signs of respiratory distress  Abd: non-distended  Skin: warm, dry and intact.  Gait:  No antalgic gait.      Sensory:   Intact and symmetrical to light touch in L1-S1 dermatomes bilaterally.     Motor:           Right Left   L2/3 Iliacus Hip flexion  5  5   L3/4 Qudratus Femoris Knee Extension  5  5   L4/5 Tib Anterior Ankle Dorsiflexion   5  5   L5/S1 Extensor Hallicus Longus Great toe extension  5  5   S1/S2 Gastroc/Soleus Plantar Flexion  5  5        Right Left   Triceps DTR       Biceps DTR       Brachioradialis DTR       Patellar DTR 2+ 2+   Achilles DTR 0+ 2+   Cuevas Absent  Absent                       Provocative:  Increased pain with bilateral axial facet loading        Imaging:  MRI lumbar spine 11/8/22  FINDINGS:  Alignment: Mild levoconvex curvature of the lumbar spine.     Vertebral column: Vertebral body heights are maintained.  No evidence of an acute fracture or aggressive marrow replacement process. Multilevel disc degeneration, most pronounced at L5-S1 with marked disc space narrowing, degenerative endplate change and marginal osteophyte formation.  Stable probable hemangioma within the L3 vertebral body.     Cord: Normal.  Conus terminates at L1.     Degenerative findings:     T12-L1: No significant disc bulge.  Mild bilateral facet arthropathy.  No neural foraminal or spinal canal stenosis.  L1-L2: No significant disc abnormality.  Mild bilateral facet arthropathy.  There is no neural foraminal stenosis.  There is no spinal canal stenosis.  L2-L3: No significant disc abnormality.  Mild bilateral facet arthropathy.  There is no neural foraminal stenosis.  There is no spinal canal stenosis.  L3-L4: Minimal diffuse disc bulge.  Mild bilateral facet arthropathy and ligamentum flavum thickening.  Mild left neural foraminal stenosis.   There is no spinal canal stenosis.  L4-L5: Mild diffuse disc bulge with mild broad-based right extraforaminal disc protrusion through an annular fissure.  Moderate bilateral facet arthropathy.  Ligamentum flavum thickening.  Mild bilateral neural foraminal stenosis.  Mild spinal canal stenosis.  L5-S1: Marked disc space narrowing with osteophytic ridging and diffuse disc herniation.  Moderate bilateral facet arthropathy.  Ligamentum flavum thickening.  Moderate left and mild-to-moderate right neural foraminal stenosis.  Mild spinal canal stenosis.     Paraspinal muscles & soft tissues: No significant abnormality.    Labs:  Lab Results   Component Value Date    HGBA1C 5.7 (H) 05/28/2024       Lab Results   Component Value Date    WBC 6.49 05/23/2024    HGB 14.2 05/23/2024    HCT 43.3 05/23/2024    MCV 90 05/23/2024     05/23/2024           Assessment:   Problem List Items Addressed This Visit    None  Visit Diagnoses       Lumbar spondylosis    -  Primary    Lumbar radiculopathy        Vertebrogenic low back pain        Chronic bilateral low back pain with right-sided sciatica                    59 y.o. year old female with PMH bipolar, HTN, hypothyroidism who presents with back pain.  She is had chronic lower back pain for many years it has been gradually worsening.  Today her pain is 8/10, constant, aching, sharp with radiating pain down the back of the right leg stopping at the knee.  She denies any numbness or weakness.    8/22/2024: Delaney Noel returns to the office for follow up.  Today she is reporting worsening lower back pain, 8/10, located across her back, worsened with physical activities, no significant relief with rest.  No pain radiating in her legs.  No numbness, weakness or any new changes to her bowel bladder function.  She has been taking gabapentin, ibuprofen, Robaxin without relief.    - on exam she is full strength in her lower extremities, increased pain with bilateral axial facet  loading.   - I independently reviewed her lumbar MRI is consistent with multilevel bilateral facet arthropathy worse at L4-5 and L5-S1 as well as diffuse central disc herniation at L5-S1 causing some mild central canal narrowing and bilateral foraminal narrowing.  Modic type 2 endplate changes at L5/S1.  - I believe she is having worsening lumbar axial facet mediated pain.  This pain is limiting her mobility interfering with her ADLs and quality of life.  No significant relief with gabapentin, ibuprofen, Robaxin.  Her pain is too severe to participate in formal physical therapy.  No relief with at-home PT directed exercises.  - for this pain, we will schedule her for bilateral L4/5 and L5/S1 diagnostic medial branch blocks.  If successful we will repeat the blocks prior to proceeding with radiofrequency ablation.  - follow up 4 weeks post procedure or sooner if needed.  If she fails to get relief with this will update her lumbar MRI.  She does have Modic endplate changes and may be a candidate for basal vertebral nerve ablation.        : Not applicable    This note was completed with dictation software and grammatical errors may exist.

## 2024-08-26 ENCOUNTER — PATIENT MESSAGE (OUTPATIENT)
Dept: PSYCHIATRY | Facility: CLINIC | Age: 59
End: 2024-08-26
Payer: COMMERCIAL

## 2024-08-26 ENCOUNTER — OFFICE VISIT (OUTPATIENT)
Dept: NEUROLOGY | Facility: CLINIC | Age: 59
End: 2024-08-26
Payer: COMMERCIAL

## 2024-08-26 DIAGNOSIS — G43.109 MIGRAINE WITH AURA AND WITHOUT STATUS MIGRAINOSUS, NOT INTRACTABLE: Primary | ICD-10-CM

## 2024-08-26 PROCEDURE — 3044F HG A1C LEVEL LT 7.0%: CPT | Mod: CPTII,95,, | Performed by: PHYSICIAN ASSISTANT

## 2024-08-26 PROCEDURE — 1159F MED LIST DOCD IN RCRD: CPT | Mod: CPTII,95,, | Performed by: PHYSICIAN ASSISTANT

## 2024-08-26 PROCEDURE — 1160F RVW MEDS BY RX/DR IN RCRD: CPT | Mod: CPTII,95,, | Performed by: PHYSICIAN ASSISTANT

## 2024-08-26 PROCEDURE — 99214 OFFICE O/P EST MOD 30 MIN: CPT | Mod: 95,,, | Performed by: PHYSICIAN ASSISTANT

## 2024-08-26 RX ORDER — KETOROLAC TROMETHAMINE 10 MG/1
TABLET, FILM COATED ORAL
Qty: 20 TABLET | Refills: 5 | Status: SHIPPED | OUTPATIENT
Start: 2024-08-26 | End: 2025-01-26

## 2024-08-26 RX ORDER — GALCANEZUMAB 120 MG/ML
120 INJECTION, SOLUTION SUBCUTANEOUS
Qty: 1 ML | Refills: 5 | Status: SHIPPED | OUTPATIENT
Start: 2024-08-26 | End: 2025-02-22

## 2024-08-26 RX ORDER — CEFUROXIME AXETIL 250 MG/1
TABLET ORAL
Qty: 6 ML | Refills: 5 | Status: SHIPPED | OUTPATIENT
Start: 2024-08-26 | End: 2025-08-26

## 2024-08-26 NOTE — PROGRESS NOTES
Established Patient     The patient location is: [place]  The chief complaint leading to consultation is: headache follow up visit    Visit type: audiovisual    Face to Face time with patient: 12 min  17 minutes of total time spent on the encounter, which includes face to face time and non-face to face time preparing to see the patient (eg, review of tests), Obtaining and/or reviewing separately obtained history, Documenting clinical information in the electronic or other health record, Independently interpreting results (not separately reported) and communicating results to the patient/family/caregiver, or Care coordination (not separately reported).     Each patient to whom he or she provides medical services by telemedicine is:  (1) informed of the relationship between the physician and patient and the respective role of any other health care provider with respect to management of the patient; and (2) notified that he or she may decline to receive medical services by telemedicine and may withdraw from such care at any time.    Notes:        SUBJECTIVE:  Patient ID: Delaney Noel   Chief Complaint: Headache    History of Present Illness:  Delaney Noel is a 59 y.o. female with migraine, depression, obesity, HLD, bipolar d/o, hypothyroidism, HTN, back pain, osteoarthritis, h/o asthma when smoking cigarettes but now resolved since smoking cessation  who presents via virtual visit alone for follow-up of headaches.       08/26/2024 - Interval History:  Ha's are well controlled on emgality. Has found toradol to be more effective at aborting ha's. Imitrex tabs do not appear to be as helpful as they once were. Discussed options. Pt amenable to trying imitrex inj. Discussed importance of limiting toradol tabs to avoid se's.   Plan: continue emgality syringe, may try imitrex inj instead prn, continue toradol prn, rtc 6-12 mo     01/02/2024 - Interval History:  Pt's ha's have remained well controlled occurring 5/30 days per  month. However, does notice 3 day long ha's. Would like imitrex as an abortive again. Toradol does help too. Does endorse waiting to take rescue at times. Discussed taking at onset.   Plan: continue emgality, may try imitrex vs toradol as abortive regimen, rtc 6-12 mo     Recommendations made at last Office Visit on 8/4/23:  - Discussed symptoms appear to be consistent with migraines. Discussed this with patient along with treatment options and patient agreed with the following plan  - ppx - continue emgality, consider switching to ajovy or making further ppx adjustments if ha's worsen  - abortive - continue toradol tabs, discussed risks, importance of limiting and to avoid other nsaids while taking  - risks, benefits, and potential side effects of emgality, ajovy, toradol discussed   - alternative treatment options offered   - importance of healthy diet, regular exercise and sleep hygiene in the treatment of headaches    - Start tracking headaches via Migraine Buddy dhara on phone   - RTC in 3 mo      Treatments Tried:  Atenolol  Emgality - helps  Ajovy - helps, but insurance prefers emgality  Lexapro  Trokendi  Depakote   Effexor   Cymbalta   Gabapentin  Lamictal  Toradol - helps  Imitrex 100mg - helped in the past  Imitrex inj - prescribed today  Nurtec - didn't help    Current Medications:    Current Outpatient Medications:     albuterol (PROVENTIL/VENTOLIN HFA) 90 mcg/actuation inhaler, Inhale 2 puffs into the lungs every 6 (six) hours as needed for Wheezing. Rescue, Disp: 18 g, Rfl: 2    aspirin (ECOTRIN) 81 MG EC tablet, Take 1 tablet (81 mg total) by mouth once daily., Disp: 90 tablet, Rfl: 3    atenoloL (TENORMIN) 50 MG tablet, Take 1 tablet (50 mg total) by mouth once daily., Disp: 90 tablet, Rfl: 3    cetirizine (ZYRTEC) 10 MG tablet, Take 10 mg by mouth once daily. Take one(1) tab by mouth daily, Disp: , Rfl:     clonazePAM (KLONOPIN) 1 MG tablet, Take 1 tablet (1 mg total) by mouth daily as needed for  Anxiety., Disp: 30 tablet, Rfl: 5    dicyclomine (BENTYL) 10 MG capsule, TAKE 1 CAPSULE BY MOUTH BEFORE MEALS AND AT BEDTIME AS NEEDED (ABDOMINAL PAIN DIARRHEA)., Disp: 360 capsule, Rfl: 1    fluticasone propionate (FLONASE) 50 mcg/actuation nasal spray, 2 sprays (100 mcg total) by Each Nostril route once daily., Disp: 16 g, Rfl: 6    gabapentin (NEURONTIN) 300 MG capsule, Take 1 capsule (300 mg total) by mouth every evening., Disp: 90 capsule, Rfl: 3    galcanezumab-gnlm (EMGALITY SYRINGE) 120 mg/mL Syrg, Inject 120 mg into the skin every 28 days., Disp: 1 mL, Rfl: 5    ibuprofen (ADVIL,MOTRIN) 800 MG tablet, Take 800 mg by mouth 3 (three) times daily as needed. , Disp: , Rfl:     ketorolac (TORADOL) 10 mg tablet, Take 2 tabs (20mg) at the onset of a headache, if headache persists you may take 1 tab every 4 to 6 hours as needed. Do not exceed 40 mg per day., Disp: 20 tablet, Rfl: 5    lamoTRIgine (LAMICTAL) 25 MG tablet, Take 3 tablets (75 mg total) by mouth once daily. Take three tabs(75mg) by mouth daily, Disp: 270 tablet, Rfl: 3    levothyroxine (SYNTHROID) 75 MCG tablet, Take 1 tablet (75 mcg total) by mouth before breakfast., Disp: 90 tablet, Rfl: 3    methocarbamoL (ROBAXIN) 750 MG Tab, Take 1 tablet (750 mg total) by mouth nightly as needed., Disp: 90 tablet, Rfl: 2    rosuvastatin (CRESTOR) 10 MG tablet, Take 1 tablet (10 mg total) by mouth once daily., Disp: 90 tablet, Rfl: 1    sumatriptan (IMITREX STATDOSE) 6 mg/0.5 mL kit, Inject SQ at onset of migraine, can repeat in 1 hrs if needed.  No more than twice per day or 3 days/wk., Disp: 6 mL, Rfl: 5    traZODone (DESYREL) 100 MG tablet, Take 1 tablet (100 mg total) by mouth nightly as needed for Insomnia., Disp: 90 tablet, Rfl: 0    venlafaxine (EFFEXOR-XR) 75 MG 24 hr capsule, Take 1 capsule (75 mg total) by mouth once daily., Disp: 90 capsule, Rfl: 0    Review of Systems - as per HPI, otherwise a balanced 10 systems review is  negative.    OBJECTIVE:  Vitals:  There were no vitals taken for this visit.     Physical Exam:  Constitutional: she appears well-developed and well-nourished. she is well groomed. NAD   HENT:    Head: Normocephalic and atraumatic  Eyes: Conjunctivae and EOM are normal  Musculoskeletal: Normal range of motion. No joint stiffness.   Psychiatric: Mood and affect are normal    Neuro: Patient is alert and oriented to person, place, and time. Language is intact and fluent. Speech is clear and fluent. Recent and remote memory are intact.  Normal attention and concentration.  Facial movement is symmetric. Moves all 4 extremities against gravity.      Review of Data:   Notes from neuro reviewed   Labs:  Office Visit on 05/28/2024   Component Date Value Ref Range Status    Vitamin B-12 05/28/2024 361  210 - 950 pg/mL Final    Thiamine 05/28/2024 101  38 - 122 ug/L Final    Hemoglobin A1C 05/28/2024 5.7 (H)  4.0 - 5.6 % Final    Estimated Avg Glucose 05/28/2024 117  68 - 131 mg/dL Final     Imaging:  No results found for this or any previous visit.  Note: I have independently reviewed any/all imaging/labs/tests and agree with the report (s) as documented.  Any discrepancies will be as noted/demarcated by free text.  CAROL LIMON 8/26/2024    ASSESSMENT:  1. Migraine with aura and without status migrainosus, not intractable          PLAN:  - Discussed symptoms appear to be consistent with migraines. Discussed this with patient along with treatment options and patient agreed with the following plan  - ppx - continue emgality syringe  - abortive - continue toradol tabs, limit usage to avoid se's, try switching to imitrex inj instead of tabs  - track headaches   - Discussed goals of therapy are to decrease the frequency, intensity, and duration of headaches  - RTC in 6-12 mo     Orders Placed This Encounter    sumatriptan (IMITREX STATDOSE) 6 mg/0.5 mL kit    galcanezumab-gnlm (EMGALITY SYRINGE) 120 mg/mL Syrg    ketorolac (TORADOL)  10 mg tablet         Discussed potential for teratogenicity with treatment, patient understands if her family planning status should change she will contact office immediately and we will safely adjust medications as needed.     Questions and concerns were sought and answered to the patient's stated verbal satisfaction.  The patient verbalizes understanding and agreement with the above stated treatment plan.     CC: eYsi Swartz DO Sarena Patel, PA-C  [facility]   [phone]    Dr. Negron was available during today's encounter.     Visit today included increased complexity associated with the care of the episodic problem migraines addressed and managing the longitudinal care of the patient due to the serious and/or complex managed problem(s) migraines.

## 2024-08-29 DIAGNOSIS — J30.9 CHRONIC ALLERGIC RHINITIS: ICD-10-CM

## 2024-08-29 NOTE — TELEPHONE ENCOUNTER
Care Due:                  Date            Visit Type   Department     Provider  --------------------------------------------------------------------------------                                EP -                              PRIMARY      [facility]  Last Visit: 05-      CARE ([facility])   MEDICINE       Yesi Swartz                              EP -                              PRIMARY      [facility]  Next Visit: 12-      CARE ([facility])   [facility]       Yesi Swartz                                                            Last  Test          Frequency    Reason                     Performed    Due Date  --------------------------------------------------------------------------------    Lipid Panel.  12 months..  rosuvastatin.............  11- 11-    [facility] Embedded Care Due Messages. Reference number: [id number].   8/29/2024 9:46:53 AM CDT

## 2024-08-30 ENCOUNTER — OFFICE VISIT (OUTPATIENT)
Dept: PSYCHIATRY | Facility: CLINIC | Age: 59
End: 2024-08-30
Payer: COMMERCIAL

## 2024-08-30 VITALS
SYSTOLIC BLOOD PRESSURE: 126 MMHG | HEIGHT: 63 IN | DIASTOLIC BLOOD PRESSURE: 81 MMHG | BODY MASS INDEX: 33.61 KG/M2 | HEART RATE: 64 BPM | WEIGHT: 189.69 LBS

## 2024-08-30 DIAGNOSIS — F51.02 INSOMNIA DUE TO PSYCHOLOGICAL STRESS: ICD-10-CM

## 2024-08-30 DIAGNOSIS — F43.12 CHRONIC POST-TRAUMATIC STRESS DISORDER (PTSD): ICD-10-CM

## 2024-08-30 DIAGNOSIS — F31.81 BIPOLAR 2 DISORDER: Primary | ICD-10-CM

## 2024-08-30 DIAGNOSIS — F33.1 MAJOR DEPRESSIVE DISORDER, RECURRENT EPISODE, MODERATE: ICD-10-CM

## 2024-08-30 DIAGNOSIS — F41.1 GAD (GENERALIZED ANXIETY DISORDER): ICD-10-CM

## 2024-08-30 PROCEDURE — 99999 PR PBB SHADOW E&M-EST. PATIENT-LVL IV: CPT | Mod: PBBFAC,,,

## 2024-08-30 RX ORDER — ATENOLOL 50 MG/1
50 TABLET ORAL DAILY
Qty: 90 TABLET | Refills: 3 | Status: SHIPPED | OUTPATIENT
Start: 2024-08-30

## 2024-08-30 RX ORDER — FLUTICASONE PROPIONATE 50 MCG
2 SPRAY, SUSPENSION (ML) NASAL DAILY
Qty: 48 G | Refills: 3 | Status: SHIPPED | OUTPATIENT
Start: 2024-08-30

## 2024-08-30 RX ORDER — METHOCARBAMOL 750 MG/1
750 TABLET, FILM COATED ORAL NIGHTLY PRN
Qty: 90 TABLET | Refills: 2 | Status: SHIPPED | OUTPATIENT
Start: 2024-08-30

## 2024-08-30 NOTE — PROGRESS NOTES
"Outpatient Psychiatry Follow-Up Visit    Clinical Status of Patient: Outpatient (Ambulatory)  08/30/2024     Chief Complaint: Pt is a 59 y.o. female who presents today for a follow-up. Met with patient.       Interval History and Content of Current Session:  Interim Events/Subjective Report/Content of Current Session:  follow up appointment.    Pt is a 59 y.o. 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     Past Psychiatric hx:   Pt. is a 59 y.o. female with a past psychiatric hx of Major depressive disorder, recurrent episode, moderate, Bipolar 2 disorder, HUNTER (generalized anxiety disorder) presenting to the clinic for an initial evaluation and treatment. Past medical history outlined below; she is currently taking clonazePAM (KLONOPIN), lamoTRIgine (LAMICTAL), QUEtiapine (SEROQUEL), EScitalopram oxalate (LEXAPRO), prescribed and managed by Dr. Swartz; she reports that these medications have not worked as well recently.     7/8/24: Pt presents to OP Psychiatry for routine follow-up for treatment/medication management of Bipolar 2, PTSD, MDD, HUNTER, Insomnia. Pt reports feeling improvement at first on Vraylar then before increasing to 3 mg, pt reports SE and worsening migraines. States she wants to go back on Seroquel because she is also not sleeping. Reports switch to Effexor was good and feels anxiety, depression, PTSD are improved, "I have stopped obsessively coloring and starting new hobbies, talking to people more, and going out and doing more. I feel better". Discussed if she wanted to increase Effexor, pt states she would like to see how she feels after switching back to Seroquel. We can increase prior to next appointment as it was intended. Pt denies SI/HI/AVH, self-harm, plan, or intent. " Pt verbalizes understanding of medication instructions through teach back. No other issues, concerns, or problems, will reassess symptoms and medications in 30 days or PRN if symptoms worsen.     Past Medical hx:   Past Medical History:   Diagnosis Date    Allergy     Depression     Hx of colonic polyps 08/04/2014    per colonoscopy report    Hypertension     Migraine     Suicidal thoughts 12/2019    [facility]     Thyroid disease     Hypothyroid, MNG        Interim hx:  Medication changes last visit:     1. Continue clonazePAM (KLONOPIN) 1 MG tablet - Take 1 tablet (1 mg total) by mouth daily as needed for Anxiety. Discussed risk of decreased RT, sedation, addictive potential, and not to mix with alcohol.   2. Continue lamoTRIgine (LAMICTAL) 25 MG tablet - Take 3 tablets (75 mg total) by mouth once daily at bedtime for bipolar disorder.  3. Discontinue cariprazine (VRAYLAR) 3 mg Cap.  4.  Pt stopped taking d/t SE of weight gain  5. Continue venlafaxine (EFFEXOR-XR) 75 MG 24 hr capsule - Take 1 capsule (75 mg total) by mouth once daily for depression, anxiety, PTSD. Discussed potential for GI side effects, sexual dysfunction, mood destabilization, headaches.  6. Continue traZODone (DESYREL) 100 MG tablet - Take 1 tablet (100 mg total) by mouth nightly as needed for Insomnia.         Anxiety: Same, no improvement  Depression: Same, no improvement  Sleep: Improved pattern  Appetite: Decreased     Denies suicidal/homicidal ideations.  Denies hopelessness/worthlessness.    Denies auditory/visual hallucinations.      Tobacco:  quit 5 years ago  Alcohol:  Rarely  Drug use:  denies  Caffeine:  1 cup daily, rarely a coke      Review of Systems   PSYCHIATRIC: Pertinent items are noted in the narrative.        CONSTITUTIONAL: weight stable        M/S: no pain today         ENT: no allergies noted today        ABD: no n/v/d     Past Medical, Family and Social History: The patient's past medical, family and social  history have been reviewed and updated as appropriate within the electronic medical record. See encounter notes.     Medication Compliance: yes      Side effects: tolerates     Risk Parameters:  Patient reports no suicidal ideation  Patient reports no homicidal ideation  Patient reports no self-injurious behavior  Patient reports no violent behavior     Exam (detailed: at least 9 elements; comprehensive: all 15 elements)   Constitutional  Vitals:  Most recent vital signs, dated less than 90 days prior to this appointment, were reviewed. Pulse:  [64]   BP: (126)/(81)     General:  unremarkable, age appropriate, casual attire, good eye contact, good rapport       Musculoskeletal  Muscle Strength/Tone:  no flaccidity, no tremor    Gait & Station:  normal      Psychiatric                       Speech:  normal tone, normal rate, rhythm, and volume   Mood & Affect:   Euthymic, congruent, appropriate         Thought Process:   Goal directed; Linear    Associations:   intact   Thought Content:   No SI/HI, delusions, or paranoia, no AV/VH   Insight & Judgement:   Good, adequate to circumstances   Orientation:   grossly intact; alert and oriented x 4    Memory:  intact for content of interview    Language:  grossly intact, can repeat    Attention Span  : Grossly intact for content of interview   Fund of Knowledge:   intact and appropriate to age and level of education        Assessment and Diagnosis   Status/Progress: Based on the examination today, the patient's problem(s) is/are under fair control.  New problems have not been presented today. Comorbidities are not currently complicating management of the primary condition.      Impression:  Pt presents to OP Psychiatry for routine follow-up for treatment/medication management of Bipolar, PTSD, MDD, HUNTER, Insomnia. Pt stopped taking Seroquel, states the Trazodone has been working fine by itself and she was concerned with weight gain. Pt has lost 10 pounds since stopping  Seroquel and it helps with her movement. Reports the Effexor could go up as her anxiety and depression has plateaued. Discussed taking 150 mg daily, pt in agreement. Pt denies any manic episodes or mood swings. She has an appointment to consult for a possible ablation as her last epidural injection failed. Pt denies SI/HI/AVH, self-harm, plan, or intent. Pt verbalizes understanding of medication instructions through teach back. No other issues, concerns, or problems, will reassess symptoms and medications in 30 days or PRN if symptoms worsen.      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 Continue clonazePAM (KLONOPIN) 1 MG tablet - Take 1 tablet (1 mg total) by mouth daily as needed for Anxiety. Discussed risk of decreased RT, sedation, addictive potential, and not to mix with alcohol.      2. Continue lamoTRIgine (LAMICTAL) 25 MG tablet - Take 3 tablets (75 mg total) by mouth once daily at bedtime for bipolar disorder.     3. Continue traZODone (DESYREL) 100 MG tablet - Take 1 tablet (100 mg total) by mouth nightly as needed for Insomnia.     4. Increase venlafaxine (EFFEXOR-XR) 75 MG 24 hr capsule - Take 2 capsules (150 mg total) by mouth once daily for depression, anxiety, PTSD. Discussed potential for GI side effects, sexual dysfunction, mood destabilization, headaches.     5. Call to report any worsening of symptoms or problems with the medication. Pt instructed to go to ER with thoughts of harming self, others.     6. Patient given contact # for psychotherapists at [facility] and also instructed she may check with insurance for list of providers.            Labs: none         Return to clinic:          -Spent 30min face to face with the pt; >50% time  spent in counseling   -Supportive therapy and psychoeducation provided  -R/B/SE's of medications discussed with the pt who expresses understanding and chooses to take medications as prescribed.   -Pt instructed to call clinic, 911 or go to nearest emergency room if sxs worsen or pt is in   crisis. The pt expresses understanding.      Toya Hauser NP  Psychiatric-Mental Health Nurse Practitioner  Department of Psychiatry    [facility] [place] [address] [place] [address]  Phone:  [phone]  Fax: [phone]

## 2024-09-03 ENCOUNTER — PATIENT MESSAGE (OUTPATIENT)
Dept: NEUROLOGY | Facility: CLINIC | Age: 59
End: 2024-09-03
Payer: COMMERCIAL

## 2024-09-13 ENCOUNTER — HOSPITAL ENCOUNTER (OUTPATIENT)
Facility: HOSPITAL | Age: 59
Discharge: HOME | End: 2024-09-13
Attending: ANESTHESIOLOGY | Admitting: ANESTHESIOLOGY
Payer: COMMERCIAL

## 2024-09-13 ENCOUNTER — HOSPITAL ENCOUNTER (OUTPATIENT)
Dept: RADIOLOGY | Facility: HOSPITAL | Age: 59
Discharge: HOME | End: 2024-09-13
Attending: ANESTHESIOLOGY | Admitting: ANESTHESIOLOGY
Payer: COMMERCIAL

## 2024-09-13 VITALS
HEART RATE: 68 BPM | DIASTOLIC BLOOD PRESSURE: 63 MMHG | OXYGEN SATURATION: 92 % | WEIGHT: 189 LBS | RESPIRATION RATE: 16 BRPM | BODY MASS INDEX: 33.49 KG/M2 | TEMPERATURE: 97 F | HEIGHT: 63 IN | SYSTOLIC BLOOD PRESSURE: 158 MMHG

## 2024-09-13 DIAGNOSIS — M47.816 LUMBAR SPONDYLOSIS: Primary | ICD-10-CM

## 2024-09-13 DIAGNOSIS — M54.50 LOWER BACK PAIN: ICD-10-CM

## 2024-09-13 PROCEDURE — 64494 INJ PARAVERT F JNT L/S 2 LEV: CPT | Mod: 50,PO | Performed by: ANESTHESIOLOGY

## 2024-09-13 PROCEDURE — 64493 INJ PARAVERT F JNT L/S 1 LEV: CPT | Mod: 50,,, | Performed by: ANESTHESIOLOGY

## 2024-09-13 PROCEDURE — 64493 INJ PARAVERT F JNT L/S 1 LEV: CPT | Mod: 50,PO | Performed by: ANESTHESIOLOGY

## 2024-09-13 PROCEDURE — 63600175 PHARM REV CODE 636 W HCPCS: Mod: PO | Performed by: ANESTHESIOLOGY

## 2024-09-13 PROCEDURE — 25000003 PHARM REV CODE 250: Mod: PO | Performed by: ANESTHESIOLOGY

## 2024-09-13 PROCEDURE — 64494 INJ PARAVERT F JNT L/S 2 LEV: CPT | Mod: 50,,, | Performed by: ANESTHESIOLOGY

## 2024-09-13 PROCEDURE — 99152 MOD SED SAME PHYS/QHP 5/>YRS: CPT | Mod: ,,, | Performed by: ANESTHESIOLOGY

## 2024-09-13 RX ORDER — BUPIVACAINE HYDROCHLORIDE 2.5 MG/ML
INJECTION, SOLUTION EPIDURAL; INFILTRATION; INTRACAUDAL
Status: DISCONTINUED | OUTPATIENT
Start: 2024-09-13 | End: 2024-09-13 | Stop reason: HOSPADM

## 2024-09-13 RX ORDER — LIDOCAINE HYDROCHLORIDE 10 MG/ML
INJECTION, SOLUTION EPIDURAL; INFILTRATION; INTRACAUDAL; PERINEURAL
Status: DISCONTINUED | OUTPATIENT
Start: 2024-09-13 | End: 2024-09-13 | Stop reason: HOSPADM

## 2024-09-13 RX ORDER — SODIUM CHLORIDE, SODIUM LACTATE, POTASSIUM CHLORIDE, CALCIUM CHLORIDE 600; 310; 30; 20 MG/100ML; MG/100ML; MG/100ML; MG/100ML
INJECTION, SOLUTION INTRAVENOUS CONTINUOUS
Status: DISCONTINUED | OUTPATIENT
Start: 2024-09-13 | End: 2024-09-13 | Stop reason: HOSPADM

## 2024-09-13 RX ORDER — MIDAZOLAM HYDROCHLORIDE 1 MG/ML
INJECTION INTRAMUSCULAR; INTRAVENOUS
Status: DISCONTINUED | OUTPATIENT
Start: 2024-09-13 | End: 2024-09-13 | Stop reason: HOSPADM

## 2024-09-13 RX ADMIN — SODIUM CHLORIDE, POTASSIUM CHLORIDE, SODIUM LACTATE AND CALCIUM CHLORIDE: 600; 310; 30; 20 INJECTION, SOLUTION INTRAVENOUS at 08:09

## 2024-09-13 NOTE — INTERVAL H&P NOTE
The patient has been examined and the H&P has been reviewed:    I concur with the findings and no changes have occurred since H&P was written.  The risks and benefits of this intervention, and alternative therapies were discussed with the patient.  The discussion of risks included infection, bleeding, need for additional procedures or surgery, nerve damage.  Questions regarding the procedure, risks, expected outcome, and possible side effects were solicited and answered to the patient's satisfaction.  Delaney Noel wishes to proceed with the injection or procedure.  Written consent was obtained.  ASA 2, MP II        There are no hospital problems to display for this patient.

## 2024-09-13 NOTE — DISCHARGE SUMMARY
[facility]  Discharge Note  Short Stay    Admit Date: 9/13/2024    Discharge Date: 9/13/2024    Attending Physician: Chandler Pereira     Discharge Provider: Chandler Pereira    Diagnoses:  There are no hospital problems to display for this patient.      Discharged Condition: Good    Final Diagnoses: Lumbar spondylosis [M47.816]    Disposition: Home or Self Care    Hospital Course: No complications, uneventful    Outcome of Hospitalization, Treatment, Procedure, or Surgery:  Patient was admitted for outpatient interventional pain management procedure. The patient tolerated the procedure well with no complications.    Follow up/Patient Instructions:  Follow up as scheduled in Pain Management office in 2-3 weeks.  Patient has received instructions and follow up date and time.    Medications:  Continue previous medications    Discharge Procedure Orders   Notify your health care provider if you experience any of the following:  temperature >100.4     Notify your health care provider if you experience any of the following:  persistent nausea and vomiting or diarrhea     Notify your health care provider if you experience any of the following:  severe uncontrolled pain     Notify your health care provider if you experience any of the following:  redness, tenderness, or signs of infection (pain, swelling, redness, odor or green/yellow discharge around incision site)     Notify your health care provider if you experience any of the following:  difficulty breathing or increased cough     Notify your health care provider if you experience any of the following:  severe persistent headache     Notify your health care provider if you experience any of the following:  worsening rash     Notify your health care provider if you experience any of the following:  persistent dizziness, light-headedness, or visual disturbances     Notify your health care provider if you experience any of the following:  increased confusion or  weakness     Activity as tolerated

## 2024-09-13 NOTE — OP NOTE
Procedure Note    Procedure Date: 9/13/2024    Procedure Performed:  bilateral Diagnostic Lumbar Medial Branch @ L4/5 and L5/S1, with Fluoroscopic Guidance    Indications:   Delaney Noel has chronic, moderate to severe axial lower back pain present for over the past 3 months that has failed to respond to physical therapy and PT-directed home exercises. There is no untreated radiculopathy or claudication present.  The patient has clinical and radiologic findings suggestive of facet mediated pain.     Pre-op diagnosis: Lumbar Spondylosis    Post-op diagnosis: same    Physician: Chandler Pereira MD    IV Anxiolysis medications: versed 2mg    Medications injected: Bupivacaine 0.25%, 0.5 cc per level    Local anesthetic used: 1% Lidocaine, 4 ml    Estimated Blood Loss: none    Complications:  none    Technique:  The patient was interviewed in the holding area and Risks/Benefits were discussed, including, but not limited to, the possibility of new or different pain, bleeding or infection.   All questions were answered.  The patient understood and accepted risks.  Consent was reviewed.  A time out was taken to identify the patient, procedure and side of the procedure. The patient was placed in a prone position, then prepped and draped in the usual sterile fashion using ChloraPrep x2 and sterile towels.  The levels were determined under fluoroscopic guidance and then marked.  1% Lidocaine was given by raising a wheal at the skin over each site and then infiltrated approximately 2cm deeper.  A 25-gauge 3.5 inch needle was introduced to the anatomic location of the L3-5 medial branch nerves on the bilateral side.  Then, after negative aspiration, 0.5 cc of Bupivacaine 0.25% was injected @ each level.  The patient tolerated the procedure well.  The patient tolerated the procedure well and was transferred to the [facility] in stable condition.  The patient was monitored after the procedure.  Patient was given post procedure and  discharge instructions to follow at home.  The patient was discharged in a stable condition.    Event Time In   In Facility 0826   In Pre-Procedure 0838   Physician Available    Anesthesia Available    Pre-Op: Bedside Procedure Start    Pre-Op: Bedside Procedure Stop    Pre-Procedure Complete 0858   Out of Pre-Procedure    Anesthesia Start    Anesthesia Start Data Collection    Setup Start    Setup Complete    In Room 0904   Prep Start    Procedure Prep Complete    MD notified pt. ready    Procedure Start 0910   Procedure Closing    Emergence    Procedure Finish 0915   Sedation Start 0903   Scope In    Extent Reached    Scope Out    Sedation End 0915   Out of Room 0917   Cleanup Start    Cleanup Complete    Cosmetic Start    Cosmetic Stop    Pain Mgmt In Room    Pain Mgmt Out Room    In Recovery    Anesthesia Finish    Bedside Procedure Start    Bedside Procedure Stop    Recovery Care Complete    Out of Recovery    To Phase II    In Phase II    Pain Mgmt Recovery Start 0915   Pain Mgmt Recovery Stop    Obs Rec Start    Obs Rec Stop    Phase II Care Complete    Out of Phase II    Procedural Care Complete    Discharge    Pain Follow Up Needed    Pain Follow Up Complete

## 2024-09-16 DIAGNOSIS — R10.30 LOWER ABDOMINAL PAIN: ICD-10-CM

## 2024-09-16 DIAGNOSIS — K52.9 CHRONIC DIARRHEA: ICD-10-CM

## 2024-09-16 RX ORDER — DICYCLOMINE HYDROCHLORIDE 10 MG/1
10 CAPSULE ORAL 4 TIMES DAILY PRN
Qty: 360 CAPSULE | Refills: 0 | Status: SHIPPED | OUTPATIENT
Start: 2024-09-16

## 2024-09-17 ENCOUNTER — TELEPHONE (OUTPATIENT)
Dept: PAIN MEDICINE | Facility: CLINIC | Age: 59
End: 2024-09-17
Payer: COMMERCIAL

## 2024-09-17 NOTE — TELEPHONE ENCOUNTER
Regarding your Lumbar Medial Branch Block ,  Dr. Pereira For Date of Service:  9/13/24    Please answer the following questions:    1. What percentage of pain relief did you receive following the block, from 0-100%? 80%    2. How many hours did pain relief last following the block?  10 hours    3. During this time please describe in detail the activities you were able to do? Walking around, put up trash, moving heavy equipment, sitting was less painful    4. Pain score from 0-10 pre procedure - 7     5. Pain score from 0-10 post procedure - 3

## 2024-09-17 NOTE — TELEPHONE ENCOUNTER
Ok to proceed with 2nd diagnostic injection        Physician - Dr Pereira    Type of Procedure/Injection - Lumbar Medial Branch Block  L4/5 and L5/S1           Laterality - Bilateral      Anxiolysis- RNIV      Need to hold medication - No      Clearance needed - No      Follow up - phone call next day

## 2024-09-18 DIAGNOSIS — M47.816 LUMBAR SPONDYLOSIS: Primary | ICD-10-CM

## 2024-09-18 RX ORDER — SODIUM CHLORIDE, SODIUM LACTATE, POTASSIUM CHLORIDE, CALCIUM CHLORIDE 600; 310; 30; 20 MG/100ML; MG/100ML; MG/100ML; MG/100ML
INJECTION, SOLUTION INTRAVENOUS CONTINUOUS
OUTPATIENT
Start: 2024-09-18

## 2024-09-19 DIAGNOSIS — F51.02 INSOMNIA DUE TO PSYCHOLOGICAL STRESS: ICD-10-CM

## 2024-09-19 RX ORDER — TRAZODONE HYDROCHLORIDE 100 MG/1
100 TABLET ORAL NIGHTLY PRN
Qty: 90 TABLET | Refills: 0 | Status: SHIPPED | OUTPATIENT
Start: 2024-09-19 | End: 2024-12-18

## 2024-09-23 ENCOUNTER — OFFICE VISIT (OUTPATIENT)
Dept: GASTROENTEROLOGY | Facility: CLINIC | Age: 59
End: 2024-09-23
Payer: COMMERCIAL

## 2024-09-23 VITALS — HEIGHT: 63 IN | BODY MASS INDEX: 32.27 KG/M2 | WEIGHT: 182.13 LBS

## 2024-09-23 DIAGNOSIS — Z86.010 HISTORY OF COLON POLYPS: ICD-10-CM

## 2024-09-23 DIAGNOSIS — K52.9 CHRONIC DIARRHEA OF UNKNOWN ORIGIN: Primary | ICD-10-CM

## 2024-09-23 DIAGNOSIS — R10.30 LOWER ABDOMINAL PAIN: ICD-10-CM

## 2024-09-23 PROCEDURE — 1160F RVW MEDS BY RX/DR IN RCRD: CPT | Mod: CPTII,S$GLB,, | Performed by: NURSE PRACTITIONER

## 2024-09-23 PROCEDURE — 99214 OFFICE O/P EST MOD 30 MIN: CPT | Mod: S$GLB,,, | Performed by: NURSE PRACTITIONER

## 2024-09-23 PROCEDURE — 3008F BODY MASS INDEX DOCD: CPT | Mod: CPTII,S$GLB,, | Performed by: NURSE PRACTITIONER

## 2024-09-23 PROCEDURE — 3044F HG A1C LEVEL LT 7.0%: CPT | Mod: CPTII,S$GLB,, | Performed by: NURSE PRACTITIONER

## 2024-09-23 PROCEDURE — 1159F MED LIST DOCD IN RCRD: CPT | Mod: CPTII,S$GLB,, | Performed by: NURSE PRACTITIONER

## 2024-09-23 PROCEDURE — 99999 PR PBB SHADOW E&M-EST. PATIENT-LVL IV: CPT | Mod: PBBFAC,,, | Performed by: NURSE PRACTITIONER

## 2024-09-23 RX ORDER — CHOLESTYRAMINE 4 G/9G
4 POWDER, FOR SUSPENSION ORAL DAILY
Qty: 90 PACKET | Refills: 3 | Status: SHIPPED | OUTPATIENT
Start: 2024-09-23 | End: 2025-09-23

## 2024-09-23 RX ORDER — DICYCLOMINE HYDROCHLORIDE 10 MG/1
10 CAPSULE ORAL 4 TIMES DAILY PRN
Qty: 360 CAPSULE | Refills: 3 | Status: SHIPPED | OUTPATIENT
Start: 2024-09-23

## 2024-09-23 NOTE — PROGRESS NOTES
Subjective:       Patient ID: Delaney Noel is a 59 y.o. female, Body mass index is 32.26 kg/m².    Chief Complaint: Diarrhea      Established patient of Dr. Lozoya & myself.    Diarrhea   This is a chronic problem. The current episode started more than 1 year ago. The problem occurs 5 to 10 times per day (intermittent). The problem has been unchanged. Diarrhea characteristics: describes stool as type 4, 5 or 7 on bristol scale; denies bloody stools. The patient states that diarrhea does not awaken her from sleep. Associated symptoms include abdominal pain (lower abdomen; intermittent), increased flatus and weight loss (intentional). Pertinent negatives include no bloating, chills, coughing, fever or vomiting. Exacerbated by: eating. Risk factors: recent travel to [place]. She has tried bismuth subsalicylate (Currently: Bentyl 10 mg BID- helps with abdominal pain but not diarrhea; OTC Pepto bismol PRN- causes constipation) for the symptoms. There is no history of bowel resection, inflammatory bowel disease, irritable bowel syndrome or a recent abdominal surgery.     Review of Systems   Constitutional:  Positive for weight loss (intentional). Negative for appetite change, chills, fever and unexpected weight change.   HENT:  Negative for trouble swallowing.    Respiratory:  Negative for cough and shortness of breath.    Cardiovascular:  Negative for chest pain.   Gastrointestinal:  Positive for abdominal pain (lower abdomen; intermittent), diarrhea and flatus. Negative for abdominal distention, anal bleeding, bloating, blood in stool, constipation, nausea, rectal pain and vomiting.   Genitourinary:  Negative for difficulty urinating and dysuria.   Musculoskeletal:  Positive for back pain. Negative for gait problem.   Skin:  Negative for rash.   Neurological:  Negative for speech difficulty.   Psychiatric/Behavioral:  Negative for confusion.        Past Medical History:   Diagnosis Date    Allergy     Depression     Hx  of colonic polyps 08/04/2014    per colonoscopy report    Hypertension     Migraine     Sleep apnea     Suicidal thoughts 12/2019    [facility] tx     Thyroid disease     Hypothyroid, MNG      Past Surgical History:   Procedure Laterality Date    COLONOSCOPY      COLONOSCOPY N/A 07/01/2020    Procedure: COLONOSCOPY;  Surgeon: Thiago Lozoya Jr., MD;  Location: [facility] ENDO;  Service: Endoscopy;  Laterality: N/A;    cystoscope      EPIDURAL STEROID INJECTION INTO LUMBAR SPINE N/A 11/23/2022    Procedure: Injection-steroid-epidural-lumbar L5/S1;  Surgeon: Chandler Pereira MD;  Location: [facility] OR;  Service: Pain Management;  Laterality: N/A;    EPIDURAL STEROID INJECTION INTO LUMBAR SPINE N/A 07/25/2023    Procedure: Injection-steroid-epidural-lumbar L5/S1;  Surgeon: Chandler Pereira MD;  Location: [facility] OR;  Service: Pain Management;  Laterality: N/A;    EPIDURAL STEROID INJECTION INTO LUMBAR SPINE N/A 06/04/2024    Procedure: Injection-steroid-epidural-lumbar  L5/S1;  Surgeon: Chandler Pereira MD;  Location: [facility] OR;  Service: Pain Management;  Laterality: N/A;    HYSTERECTOMY      JAMIE with BSO- age 42    INJECTION OF ANESTHETIC AGENT AROUND MEDIAL BRANCH NERVES INNERVATING LUMBAR FACET JOINT Bilateral 09/13/2024    Procedure: Block-nerve-medial branch-lumbar    L4/5, L5/S1;  Surgeon: Chandler Pereira MD;  Location: [facility] OR;  Service: Pain Management;  Laterality: Bilateral;    OOPHORECTOMY        Family History   Problem Relation Name Age of Onset    Heart disease Mother          had childhood rheumatic fever    Cancer Mother          uterine cancer    Cervical cancer Mother  28    Atrial fibrillation Father      Macular degeneration Maternal Grandmother      Cancer Maternal Grandmother          uterine cancer    Stroke Maternal Grandmother      Heart disease Maternal Grandmother  50        MI    Stroke Maternal Grandfather      Nephrolithiasis Neg Hx      Glaucoma Neg Hx        Wt Readings from Last 10 Encounters:    09/23/24 82.6 kg (182 lb 1.6 oz)   09/10/24 85.7 kg (189 lb)   08/22/24 86.1 kg (189 lb 13.1 oz)   06/18/24 88.4 kg (194 lb 14.2 oz)   05/28/24 90.7 kg (200 lb)   05/28/24 90.8 kg (200 lb 1.1 oz)   05/16/24 90 kg (198 lb 6.6 oz)   12/11/23 88.5 kg (195 lb)   11/28/23 88.7 kg (195 lb 10.5 oz)   07/21/23 88.5 kg (195 lb)     Lab Results   Component Value Date    WBC 6.49 05/23/2024    HGB 14.2 05/23/2024    HCT 43.3 05/23/2024    MCV 90 05/23/2024     05/23/2024     CMP  Sodium   Date Value Ref Range Status   05/23/2024 143 136 - 145 mmol/L Final     Potassium   Date Value Ref Range Status   05/23/2024 4.3 3.5 - 5.1 mmol/L Final     Chloride   Date Value Ref Range Status   05/23/2024 108 95 - 110 mmol/L Final     CO2   Date Value Ref Range Status   05/23/2024 25 23 - 29 mmol/L Final     Glucose   Date Value Ref Range Status   05/23/2024 101 70 - 110 mg/dL Final     BUN   Date Value Ref Range Status   05/23/2024 15 6 - 20 mg/dL Final     Creatinine   Date Value Ref Range Status   05/23/2024 1.0 0.5 - 1.4 mg/dL Final     Calcium   Date Value Ref Range Status   05/23/2024 10.1 8.7 - 10.5 mg/dL Final     Total Protein   Date Value Ref Range Status   05/23/2024 6.8 6.0 - 8.4 g/dL Final     Albumin   Date Value Ref Range Status   05/23/2024 4.0 3.5 - 5.2 g/dL Final     Total Bilirubin   Date Value Ref Range Status   05/23/2024 0.6 0.1 - 1.0 mg/dL Final     Comment:     For infants and newborns, interpretation of results should be based  on gestational age, weight and in agreement with clinical  observations.    Premature Infant recommended reference ranges:  Up to 24 hours.............<8.0 mg/dL  Up to 48 hours............<12.0 mg/dL  3-5 days..................<15.0 mg/dL  6-29 days.................<15.0 mg/dL       Alkaline Phosphatase   Date Value Ref Range Status   05/23/2024 78 55 - 135 U/L Final     AST   Date Value Ref Range Status   05/23/2024 22 10 - 40 U/L Final     ALT   Date Value Ref Range Status    05/23/2024 32 10 - 44 U/L Final     Anion Gap   Date Value Ref Range Status   05/23/2024 10 8 - 16 mmol/L Final     eGFR if    Date Value Ref Range Status   10/15/2021 >60.0 >60 mL/min/1.73 m^2 Final     eGFR if non    Date Value Ref Range Status   10/15/2021 >60.0 >60 mL/min/1.73 m^2 Final     Comment:     Calculation used to obtain the estimated glomerular filtration  rate (eGFR) is the CKD-EPI equation.          Lab Results   Component Value Date    TSH 3.037 05/23/2024           Reviewed prior medical records including endoscopy history (see surgical history).     Objective:      Physical Exam  Constitutional:       General: She is not in acute distress.     Appearance: She is well-developed.   HENT:      Head: Normocephalic.      Right Ear: Hearing normal.      Left Ear: Hearing normal.      Nose: Nose normal.      Mouth/Throat:      Mouth: No oral lesions.      Pharynx: Uvula midline.   Eyes:      General: Lids are normal.      Conjunctiva/sclera: Conjunctivae normal.      Pupils: Pupils are equal, round, and reactive to light.   Neck:      Trachea: Trachea normal.   Cardiovascular:      Rate and Rhythm: Normal rate and regular rhythm.      Heart sounds: Normal heart sounds. No murmur heard.  Pulmonary:      Effort: Pulmonary effort is normal. No respiratory distress.      Breath sounds: Normal breath sounds. No stridor. No wheezing.   Abdominal:      General: Bowel sounds are normal. There is no distension.      Palpations: Abdomen is soft. There is no mass.      Tenderness: There is abdominal tenderness in the right upper quadrant. There is no guarding or rebound.   Musculoskeletal:         General: Normal range of motion.      Cervical back: Normal range of motion.   Skin:     General: Skin is warm and dry.      Findings: No rash.      Comments: Non jaundiced   Neurological:      Mental Status: She is alert and oriented to person, place, and time.   Psychiatric:          Speech: Speech normal.         Behavior: Behavior normal. Behavior is cooperative.           Assessment:       1. Chronic diarrhea of unknown origin    2. Lower abdominal pain    3. History of colon polyps           Plan:   All diagnoses and orders for this visit:    Chronic diarrhea of unknown origin & Lower abdominal pain  - Stool Exam-Ova,Cysts,Parasites; Future; Expected date: 09/23/2024  - Giardia / Cryptosporidum, EIA; Future; Expected date: 09/23/2024  - Stool culture; Future; Expected date: 09/23/2024  - Clostridium difficile EIA; Future; Expected date: 09/23/2024  - Refill and continue: dicyclomine (BENTYL) 10 MG capsule; Take 1 capsule (10 mg total) by mouth 4 (four) times daily as needed (abdominal pain; diarrhea).  Dispense: 360 capsule; Refill: 3  - Start: cholestyramine (QUESTRAN) 4 gram packet; Take 1 packet (4 g total) by mouth once daily.  Dispense: 90 packet; Refill: 3  - Recommended increase fiber in diet, especially soluble fiber since this can help bulk up the stool consistency and may help to slow down how fast the stool goes through the colon and can prevent diarrhea   - Schedule Colonoscopy   - Consider CT of abdomen and pelvis if symptoms persist    History of colon polyps   - Schedule Colonoscopy     If no improvement in symptoms or symptoms worsen, call/follow-up at clinic or go to ER

## 2024-09-26 ENCOUNTER — TELEPHONE (OUTPATIENT)
Dept: PAIN MEDICINE | Facility: CLINIC | Age: 59
End: 2024-09-26
Payer: COMMERCIAL

## 2024-09-26 NOTE — TELEPHONE ENCOUNTER
----- Message from Elsa Lentz sent at 9/26/2024  3:38 PM CDT -----  Type: Needs Medical Advice  Who Called:  pt  Best Call Back Number: [phone]  Additional Information: pt is calling in regards to a call to give instructions for her procedure. Needs to speak to the nurse. Please call back and advise. Thanks!

## 2024-10-01 ENCOUNTER — OFFICE VISIT (OUTPATIENT)
Dept: PSYCHIATRY | Facility: CLINIC | Age: 59
End: 2024-10-01
Payer: COMMERCIAL

## 2024-10-01 DIAGNOSIS — F41.1 GAD (GENERALIZED ANXIETY DISORDER): ICD-10-CM

## 2024-10-01 DIAGNOSIS — F51.02 INSOMNIA DUE TO PSYCHOLOGICAL STRESS: ICD-10-CM

## 2024-10-01 DIAGNOSIS — F33.1 MAJOR DEPRESSIVE DISORDER, RECURRENT EPISODE, MODERATE: ICD-10-CM

## 2024-10-01 DIAGNOSIS — F43.12 CHRONIC POST-TRAUMATIC STRESS DISORDER (PTSD): Primary | ICD-10-CM

## 2024-10-01 DIAGNOSIS — F31.81 BIPOLAR 2 DISORDER: ICD-10-CM

## 2024-10-01 PROCEDURE — 1159F MED LIST DOCD IN RCRD: CPT | Mod: CPTII,95,,

## 2024-10-01 PROCEDURE — 99214 OFFICE O/P EST MOD 30 MIN: CPT | Mod: 95,,,

## 2024-10-01 PROCEDURE — 3044F HG A1C LEVEL LT 7.0%: CPT | Mod: CPTII,95,,

## 2024-10-01 PROCEDURE — 1160F RVW MEDS BY RX/DR IN RCRD: CPT | Mod: CPTII,95,,

## 2024-10-01 RX ORDER — VENLAFAXINE HYDROCHLORIDE 150 MG/1
150 CAPSULE, EXTENDED RELEASE ORAL EVERY MORNING
Qty: 90 CAPSULE | Refills: 0 | Status: SHIPPED | OUTPATIENT
Start: 2024-10-01 | End: 2024-12-30

## 2024-10-01 NOTE — PROGRESS NOTES
Outpatient Psychiatry Follow-Up Visit    Clinical Status of Patient: Outpatient (Ambulatory)  10/01/2024    The patient location is:  [place], [place]  The patient phone number is: [phone]   Visit type: Virtual visit with synchronous audio and video  Each patient to whom he or she provides medical services by telemedicine is:  (1) informed of the relationship between the physician and patient and the respective role of any other health care provider with respect to management of the patient; and (2) notified that he or she may decline to receive medical services by telemedicine and may withdraw from such care at any time.     Chief Complaint: Pt is a 59 y.o. female who presents today for a follow-up. Met with patient.       Interval History and Content of Current Session:  Interim Events/Subjective Report/Content of Current Session:  follow up appointment.    Pt is a 59 y.o. 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     Past Psychiatric hx:   Pt. is a 59 y.o. female with a past psychiatric hx of Major depressive disorder, recurrent episode, moderate, Bipolar 2 disorder, HUNTER (generalized anxiety disorder) presenting to the clinic for an initial evaluation and treatment. Past medical history outlined below; she is currently taking clonazePAM (KLONOPIN), lamoTRIgine (LAMICTAL), QUEtiapine (SEROQUEL), EScitalopram oxalate (LEXAPRO), prescribed and managed by Dr. Swartz; she reports that these medications have not worked as well recently.     8/30/24: Pt presents to OP Psychiatry for routine follow-up for treatment/medication management of Bipolar, PTSD, MDD, HUNTER, Insomnia. Pt stopped taking Seroquel, states the Trazodone has been working fine by itself and she was concerned with weight gain. Pt  has lost 10 pounds since stopping Seroquel and it helps with her movement. Reports the Effexor could go up as her anxiety and depression has plateaued. Discussed taking 150 mg daily, pt in agreement. Pt denies any manic episodes or mood swings. She has an appointment to consult for a possible ablation as her last epidural injection failed. Pt denies SI/HI/AVH, self-harm, plan, or intent. Pt verbalizes understanding of medication instructions through teach back. No other issues, concerns, or problems, will reassess symptoms and medications in 30 days or PRN if symptoms worsen.     Past Medical hx:   Past Medical History:   Diagnosis Date    Allergy     Depression     Hx of colonic polyps 08/04/2014    per colonoscopy report    Hypertension     Migraine     Sleep apnea     Suicidal thoughts 12/2019    [facility] tx     Thyroid disease     Hypothyroid, MNG        Interim hx:  Medication changes last visit:     1. Continue clonazePAM (KLONOPIN) 1 MG tablet - Take 1 tablet (1 mg total) by mouth daily as needed for Anxiety. Discussed risk of decreased RT, sedation, addictive potential, and not to mix with alcohol.   2. Continue lamoTRIgine (LAMICTAL) 25 MG tablet - Take 3 tablets (75 mg total) by mouth once daily at bedtime for bipolar disorder.  3. Continue traZODone (DESYREL) 100 MG tablet - Take 1 tablet (100 mg total) by mouth nightly as needed for Insomnia.  4. Increase venlafaxine (EFFEXOR-XR) 75 MG 24 hr capsule - Take 2 capsules (150 mg total) by mouth once daily for depression, anxiety, PTSD. Discussed potential for GI side effects, sexual dysfunction, mood destabilization, headaches.    Anxiety: Improved  Depression: Improved  Sleep: Good, no issues  Appetite: Same, no issues     Denies suicidal/homicidal ideations.  Denies hopelessness/worthlessness.    Denies auditory/visual hallucinations.      Tobacco:  quit 5 years ago  Alcohol:  Rarely  Drug use:  denies  Caffeine:  1 cup daily, rarely a coke       Review of Systems   PSYCHIATRIC: Pertinent items are noted in the narrative.        CONSTITUTIONAL: weight stable        M/S: no pain today         ENT: no allergies noted today        ABD: no n/v/d     Past Medical, Family and Social History: The patient's past medical, family and social history have been reviewed and updated as appropriate within the electronic medical record. See encounter notes.     Medication Compliance: yes      Side effects: tolerates     Risk Parameters:  Patient reports no suicidal ideation  Patient reports no homicidal ideation  Patient reports no self-injurious behavior  Patient reports no violent behavior     Exam (detailed: at least 9 elements; comprehensive: all 15 elements)   Constitutional  Vitals:  Most recent vital signs, dated less than 90 days prior to this appointment, were reviewed.    General:  unremarkable, age appropriate, casual attire, good eye contact, good rapport       Musculoskeletal  Muscle Strength/Tone:  no flaccidity, no tremor    Gait & Station:  normal      Psychiatric                       Speech:  normal tone, normal rate, rhythm, and volume   Mood & Affect:   Euthymic, congruent, appropriate         Thought Process:   Goal directed; Linear    Associations:   intact   Thought Content:   No SI/HI, delusions, or paranoia, no AV/VH   Insight & Judgement:   Good, adequate to circumstances   Orientation:   grossly intact; alert and oriented x 4    Memory:  intact for content of interview    Language:  grossly intact, can repeat    Attention Span  : Grossly intact for content of interview   Fund of Knowledge:   intact and appropriate to age and level of education        Assessment and Diagnosis   Status/Progress: Based on the examination today, the patient's problem(s) is/are under fair control.  New problems have not been presented today. Comorbidities are not currently complicating management of the primary condition.      Impression:  Pt presents to OP  Psychiatry for routine follow-up for treatment/medication management of Bipolar, PTSD, MDD, HUNTER, Insomnia. Pt reports anxiety and depression have improved after Effexor increase, denies PTSD symptoms. States all symptoms are effectively managed, denies need for any medication changes at this time. States her handwriting has gotten shaky for the past 4 months, we discussed contacting her [facility]ist for direction or to get a consult r/t this. She states it could be r/t her back issues also. Overall reports improvements with her mental health. Pt denies SI/HI/AVH, self-harm, plan, or intent. Pt verbalizes understanding of medication instructions through teach back. No other issues, concerns, or problems, will reassess symptoms and medications in 3 months or PRN if symptoms worsen.      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 Continue clonazePAM (KLONOPIN) 1 MG tablet - Take 1 tablet (1 mg total) by mouth daily as needed for Anxiety. Discussed risk of decreased RT, sedation, addictive potential, and not to mix with alcohol.      2. Continue lamoTRIgine (LAMICTAL) 25 MG tablet - Take 3 tablets (75 mg total) by mouth once daily at bedtime for bipolar disorder.     3. Continue traZODone (DESYREL) 100 MG tablet - Take 1 tablet (100 mg total) by mouth nightly as needed for Insomnia.     4. Continue venlafaxine (EFFEXOR-XR) 150 MG 24 hr capsule - Take 1 capsule (150 mg total) by mouth once daily for depression, anxiety, PTSD. Discussed potential for GI side effects, sexual dysfunction, mood destabilization, headaches.     5. Call to report any worsening of symptoms or problems with the medication. Pt instructed to go to ER with thoughts of harming self, others.     6.  Patient given contact # for psychotherapists at [facility] and also instructed she may check with insurance for list of providers.          Labs: none         Return to clinic:      3 months or PRN if symptoms worsen    -Spent 30min face to face with the pt; >50% time spent in counseling   -Supportive therapy and psychoeducation provided  -R/B/SE's of medications discussed with the pt who expresses understanding and chooses to take medications as prescribed.   -Pt instructed to call clinic, 911 or go to nearest emergency room if sxs worsen or pt is in   crisis. The pt expresses understanding.      Toya Hauser NP  Psychiatric-Mental Health Nurse Practitioner  Department of Psychiatry    [facility] [place] [address] [place] [address]  Phone:  [phone]  Fax: [phone]

## 2024-10-02 ENCOUNTER — HOSPITAL ENCOUNTER (OUTPATIENT)
Facility: HOSPITAL | Age: 59
Discharge: HOME | End: 2024-10-02
Attending: ANESTHESIOLOGY | Admitting: ANESTHESIOLOGY
Payer: COMMERCIAL

## 2024-10-02 ENCOUNTER — HOSPITAL ENCOUNTER (OUTPATIENT)
Dept: RADIOLOGY | Facility: HOSPITAL | Age: 59
Discharge: HOME | End: 2024-10-02
Attending: ANESTHESIOLOGY | Admitting: ANESTHESIOLOGY
Payer: COMMERCIAL

## 2024-10-02 VITALS
OXYGEN SATURATION: 96 % | HEIGHT: 63 IN | BODY MASS INDEX: 32.25 KG/M2 | SYSTOLIC BLOOD PRESSURE: 167 MMHG | TEMPERATURE: 97 F | WEIGHT: 182 LBS | DIASTOLIC BLOOD PRESSURE: 89 MMHG | RESPIRATION RATE: 18 BRPM | HEART RATE: 66 BPM

## 2024-10-02 DIAGNOSIS — M47.816 LUMBAR SPONDYLOSIS: Primary | ICD-10-CM

## 2024-10-02 DIAGNOSIS — M54.50 LOWER BACK PAIN: ICD-10-CM

## 2024-10-02 PROCEDURE — 64493 INJ PARAVERT F JNT L/S 1 LEV: CPT | Mod: 50,PO | Performed by: ANESTHESIOLOGY

## 2024-10-02 PROCEDURE — 63600175 PHARM REV CODE 636 W HCPCS: Mod: PO | Performed by: ANESTHESIOLOGY

## 2024-10-02 PROCEDURE — 64494 INJ PARAVERT F JNT L/S 2 LEV: CPT | Mod: 50,,, | Performed by: ANESTHESIOLOGY

## 2024-10-02 PROCEDURE — 64494 INJ PARAVERT F JNT L/S 2 LEV: CPT | Mod: 50,PO | Performed by: ANESTHESIOLOGY

## 2024-10-02 PROCEDURE — 64493 INJ PARAVERT F JNT L/S 1 LEV: CPT | Mod: 50,,, | Performed by: ANESTHESIOLOGY

## 2024-10-02 RX ORDER — LIDOCAINE HYDROCHLORIDE 10 MG/ML
INJECTION, SOLUTION EPIDURAL; INFILTRATION; INTRACAUDAL; PERINEURAL
Status: DISCONTINUED | OUTPATIENT
Start: 2024-10-02 | End: 2024-10-02 | Stop reason: HOSPADM

## 2024-10-02 RX ORDER — SODIUM CHLORIDE, SODIUM LACTATE, POTASSIUM CHLORIDE, CALCIUM CHLORIDE 600; 310; 30; 20 MG/100ML; MG/100ML; MG/100ML; MG/100ML
INJECTION, SOLUTION INTRAVENOUS CONTINUOUS
Status: DISCONTINUED | OUTPATIENT
Start: 2024-10-02 | End: 2024-10-02 | Stop reason: HOSPADM

## 2024-10-02 RX ORDER — BUPIVACAINE HYDROCHLORIDE 2.5 MG/ML
INJECTION, SOLUTION EPIDURAL; INFILTRATION; INTRACAUDAL
Status: DISCONTINUED | OUTPATIENT
Start: 2024-10-02 | End: 2024-10-02 | Stop reason: HOSPADM

## 2024-10-02 RX ORDER — MIDAZOLAM HYDROCHLORIDE 1 MG/ML
INJECTION INTRAMUSCULAR; INTRAVENOUS
Status: DISCONTINUED | OUTPATIENT
Start: 2024-10-02 | End: 2024-10-02 | Stop reason: HOSPADM

## 2024-10-02 RX ADMIN — SODIUM CHLORIDE, POTASSIUM CHLORIDE, SODIUM LACTATE AND CALCIUM CHLORIDE: 600; 310; 30; 20 INJECTION, SOLUTION INTRAVENOUS at 03:10

## 2024-10-02 NOTE — H&P
[place] - Surgery  History & Physical - Short Stay  Pain Management       SUBJECTIVE:     Procedure: Procedure(s) (LRB):  Block-nerve-medial branch-lumbar   L4/5, L5/S1 (Bilateral)    Chief Complaint/Reason for Admission:  Lumbar spondylosis [M47.816]    PTA Medications   Medication Sig    albuterol (PROVENTIL/VENTOLIN HFA) 90 mcg/actuation inhaler Inhale 2 puffs into the lungs every 6 (six) hours as needed for Wheezing. Rescue    aspirin (ECOTRIN) 81 MG EC tablet Take 1 tablet (81 mg total) by mouth once daily.    atenoloL (TENORMIN) 50 MG tablet Take 1 tablet (50 mg total) by mouth once daily.    clonazePAM (KLONOPIN) 1 MG tablet Take 1 tablet (1 mg total) by mouth daily as needed for Anxiety.    dicyclomine (BENTYL) 10 MG capsule Take 1 capsule (10 mg total) by mouth 4 (four) times daily as needed (abdominal pain; diarrhea).    gabapentin (NEURONTIN) 300 MG capsule Take 1 capsule (300 mg total) by mouth every evening.    galcanezumab-gnlm (EMGALITY SYRINGE) 120 mg/mL Syrg Inject 120 mg into the skin every 28 days.    lamoTRIgine (LAMICTAL) 25 MG tablet Take 3 tablets (75 mg total) by mouth once daily. Take three tabs(75mg) by mouth daily    levothyroxine (SYNTHROID) 75 MCG tablet Take 1 tablet (75 mcg total) by mouth before breakfast.    methocarbamoL (ROBAXIN) 750 MG Tab Take 1 tablet (750 mg total) by mouth nightly as needed.    rosuvastatin (CRESTOR) 10 MG tablet Take 1 tablet (10 mg total) by mouth once daily.    sumatriptan (IMITREX STATDOSE) 6 mg/0.5 mL kit Inject SQ at onset of migraine, can repeat in 1 hrs if needed.  No more than twice per day or 3 days/wk.    traZODone (DESYREL) 100 MG tablet Take 1 tablet (100 mg total) by mouth nightly as needed for Insomnia.    venlafaxine (EFFEXOR-XR) 150 MG Cp24 Take 1 capsule (150 mg total) by mouth every morning.    cetirizine (ZYRTEC) 10 MG tablet Take 10 mg by mouth once daily. Take one(1) tab by mouth daily    cholestyramine (QUESTRAN) 4 gram packet Take 1  packet (4 g total) by mouth once daily.    fluticasone propionate (FLONASE) 50 mcg/actuation nasal spray 2 sprays (100 mcg total) by Each Nostril route once daily.    ibuprofen (ADVIL,MOTRIN) 800 MG tablet Take 800 mg by mouth 3 (three) times daily as needed.  (Patient not taking: Reported on 8/30/2024)    ketorolac (TORADOL) 10 mg tablet Take 2 tabs (20mg) at the onset of a headache, if headache persists you may take 1 tab every 4 to 6 hours as needed. Do not exceed 40 mg per day.       Review of patient's allergies indicates:   Allergen Reactions    Hay fever and allergy relief      Patient states environmental allergies; NKDA       Past Medical History:   Diagnosis Date    Allergy     Depression     Hx of colonic polyps 08/04/2014    per colonoscopy report    Hypertension     Migraine     Sleep apnea     Suicidal thoughts 12/2019    [facility] tx     Thyroid disease     Hypothyroid, MNG     Past Surgical History:   Procedure Laterality Date    COLONOSCOPY      COLONOSCOPY N/A 07/01/2020    Procedure: COLONOSCOPY;  Surgeon: Thiago Lozoya Jr., MD;  Location: [facility] ENDO;  Service: Endoscopy;  Laterality: N/A;    cystoscope      EPIDURAL STEROID INJECTION INTO LUMBAR SPINE N/A 11/23/2022    Procedure: Injection-steroid-epidural-lumbar L5/S1;  Surgeon: Chandler Pereira MD;  Location: [facility] OR;  Service: Pain Management;  Laterality: N/A;    EPIDURAL STEROID INJECTION INTO LUMBAR SPINE N/A 07/25/2023    Procedure: Injection-steroid-epidural-lumbar L5/S1;  Surgeon: Chandler Pereira MD;  Location: [facility] OR;  Service: Pain Management;  Laterality: N/A;    EPIDURAL STEROID INJECTION INTO LUMBAR SPINE N/A 06/04/2024    Procedure: Injection-steroid-epidural-lumbar  L5/S1;  Surgeon: Chandler Pereira MD;  Location: [facility] OR;  Service: Pain Management;  Laterality: N/A;    HYSTERECTOMY      JAMIE with BSO- age 42    INJECTION OF ANESTHETIC AGENT AROUND MEDIAL BRANCH NERVES INNERVATING LUMBAR FACET JOINT Bilateral 09/13/2024     Procedure: Block-nerve-medial branch-lumbar    L4/5, L5/S1;  Surgeon: Chandler Pereira MD;  Location: [facility];  Service: Pain Management;  Laterality: Bilateral;    OOPHORECTOMY       Family History   Problem Relation Name Age of Onset    Heart disease Mother          had childhood rheumatic fever    Cancer Mother          uterine cancer    Cervical cancer Mother  28    Atrial fibrillation Father      Macular degeneration Maternal Grandmother      Cancer Maternal Grandmother          uterine cancer    Stroke Maternal Grandmother      Heart disease Maternal Grandmother  50        MI    Stroke Maternal Grandfather      Nephrolithiasis Neg Hx      Glaucoma Neg Hx       Social History     Tobacco Use    Smoking status: Former     Current packs/day: 0.00     Average packs/day: 0.3 packs/day for 25.0 years (6.3 ttl pk-yrs)     Types: Cigarettes     Start date: 1990     Quit date: 2015     Years since quittin.3    Smokeless tobacco: Never    Tobacco comments:     had quit x 2   Substance Use Topics    Alcohol use: Yes     Alcohol/week: 2.0 standard drinks of alcohol     Types: 2 Cans of beer per week     Comment: hardly ever    Drug use: No        Current Facility-Administered Medications:     lactated ringers infusion, , Intravenous, Continuous, Chandler Pereira MD    Review of Systems:  General ROS: negative for - fever  Dermatological ROS: negative for rash    OBJECTIVE:     Vital Signs (Most Recent):     Body mass index is 32.24 kg/m².    Physical Exam:  General appearance - alert, well appearing, and in no distress  Mental status - AOx3  Eyes - pupils equal and reactive, extraocular eye movements intact  Heart - normal rate, regular rhythm, normal S1, S2, no murmurs, rubs, clicks or gallops  Chest - clear to auscultation, no wheezes, rales or rhonchi, symmetric air entry  Abdomen - soft, nontender, nondistended, no masses or organomegaly  Neurological - alert, oriented, normal speech, no focal findings or  movement disorder noted  Extremities - peripheral pulses normal, no pedal edema, no clubbing or cyanosis      ASSESSMENT/PLAN:     There are no hospital problems to display for this patient.     Indication: The patient has clinical and radiologic findings suggestive of facet mediated pain and is s/p 1st diagnostic bilateral lumbar L4/5 and L5/S1 medial branch blocks with at least 80% relief of their axial back pain lasting the duration of the local anesthetic utilized.    We will proceed with 2nd diagnostic bilateral L4/5 and L5/S1 medial branch blocks.    The risks and benefits of this intervention, and alternative therapies were discussed with the patient.  The discussion of risks included infection, bleeding, need for additional procedures or surgery, nerve damage, paralysis, adverse medication reaction(s), stroke, and/or death.  Questions regarding the procedure, risks, expected outcome, and possible side effects were solicited and answered to the patient's satisfaction.  Delaney wishes to proceed with the injection.  Verbal and written consent were verified.  ASA 2, MP II      Proceed with intervention as scheduled.    Chandler Pereira M.D.  Interventional Pain Medicine / Anesthesiology

## 2024-10-03 NOTE — DISCHARGE SUMMARY
[facility]  Discharge Note  Short Stay    Admit Date: 10/2/2024    Discharge Date: 10/2/2024    Attending Physician: Chandler Pereira     Discharge Provider: Chandler Pereira    Diagnoses:  There are no hospital problems to display for this patient.      Discharged Condition: Good    Final Diagnoses: Lumbar spondylosis [M47.816]    Disposition: Home or Self Care    Hospital Course: No complications, uneventful    Outcome of Hospitalization, Treatment, Procedure, or Surgery:  Patient was admitted for outpatient interventional pain management procedure. The patient tolerated the procedure well with no complications.    Follow up/Patient Instructions:  Follow up as scheduled in Pain Management office in 2-3 weeks.  Patient has received instructions and follow up date and time.    Medications:  Continue previous medications    No discharge procedures on file.

## 2024-10-03 NOTE — OP NOTE
Procedure Note    Procedure Date: 10/3/2024    Procedure Performed:  bilateral Diagnostic Lumbar Medial Branch @ L4/5 and L5/S1, with Fluoroscopic Guidance    Indications: The patient has clinical and radiologic findings suggestive of facet mediated pain and is s/p 1st diagnostic bilateral lumbar L4/5 and L5/S1 medial branch blocks with at least 80% relief of their axial back pain lasting the duration of the local anesthetic utilized.       Pre-op diagnosis: Lumbar Spondylosis    Post-op diagnosis: same    Physician: Chandler Pereira MD    IV Anxiolysis medications: versed 2mg    Medications injected: Bupivacaine 0.25%, 0.5 cc per level    Local anesthetic used: 1% Lidocaine, 4 ml    Estimated Blood Loss: none    Complications:  none    Technique:  The patient was interviewed in the holding area and Risks/Benefits were discussed, including, but not limited to, the possibility of new or different pain, bleeding or infection.   All questions were answered.  The patient understood and accepted risks.  Consent was reviewed.  A time out was taken to identify the patient, procedure and side of the procedure. The patient was placed in a prone position, then prepped and draped in the usual sterile fashion using ChloraPrep x2 and sterile towels.  The levels were determined under fluoroscopic guidance and then marked.  1% Lidocaine was given by raising a wheal at the skin over each site and then infiltrated approximately 2cm deeper.  A 25-gauge 3.5 inch needle was introduced to the anatomic location of the L3-5 medial branch nerves on the bilateral side.  Then, after negative aspiration, 0.5 cc of Bupivacaine 0.25% was injected @ each level.  The patient tolerated the procedure well.  The patient tolerated the procedure well and was transferred to the [facility] in stable condition.  The patient was monitored after the procedure.  Patient was given post procedure and discharge instructions to follow at home.  The patient was  discharged in a stable condition.    Event Time In   In Facility 1421   In Pre-Procedure 1447   Physician Available    Anesthesia Available    Pre-Op: Bedside Procedure Start    Pre-Op: Bedside Procedure Stop    Pre-Procedure Complete 1518   Out of Pre-Procedure    Anesthesia Start    Anesthesia Start Data Collection    Setup Start    Setup Complete    In Room 1556   Prep Start    Procedure Prep Complete    MD notified pt. ready    Procedure Start 1602   Procedure Closing    Emergence    Procedure Finish 1607   Sedation Start 1600   Scope In    Extent Reached    Scope Out    Sedation End 1608   Out of Room 1609   Cleanup Start    Cleanup Complete    Cosmetic Start    Cosmetic Stop    Pain Mgmt In Room    Pain Mgmt Out Room    In Recovery 1610   Anesthesia Finish    Bedside Procedure Start    Bedside Procedure Stop    Recovery Care Complete    Out of Recovery    To Phase II    In Phase II    Pain Mgmt Recovery Start 1610   Pain Mgmt Recovery Stop 1620   Obs Rec Start    Obs Rec Stop    Phase II Care Complete    Out of Phase II    Procedural Care Complete 1620   Discharge 1620   Pain Follow Up Needed    Pain Follow Up Complete

## 2024-10-04 ENCOUNTER — TELEPHONE (OUTPATIENT)
Dept: PAIN MEDICINE | Facility: CLINIC | Age: 59
End: 2024-10-04
Payer: COMMERCIAL

## 2024-10-04 DIAGNOSIS — M47.816 LUMBAR SPONDYLOSIS: Primary | ICD-10-CM

## 2024-10-04 RX ORDER — ALPRAZOLAM 1 MG/1
1 TABLET, ORALLY DISINTEGRATING ORAL ONCE AS NEEDED
OUTPATIENT
Start: 2024-10-04 | End: 2036-03-02

## 2024-10-04 NOTE — TELEPHONE ENCOUNTER
Regarding your Lumbar Medial Branch Block , For Date of Service:  10/2/24    Please answer the following questions:    1. What percentage of pain relief did you receive following the block, from 0-100%? =80    2. How many hours did pain relief last following the block?  = 11 hours    3. During this time please describe in detail the activities you were able to do? Standing and sitting for a longer time frame    4. Pain score from 0-10 pre procedure - 9     5. Pain score from 0-10 post procedure - 3

## 2024-10-04 NOTE — TELEPHONE ENCOUNTER
Spoke with patient her  RACH is scheduled  on 10/28- needs a am time slot. Spoke with patient no noted medications to hold.

## 2024-10-27 ENCOUNTER — PATIENT MESSAGE (OUTPATIENT)
Dept: PSYCHIATRY | Facility: CLINIC | Age: 59
End: 2024-10-27
Payer: COMMERCIAL

## 2024-10-28 ENCOUNTER — HOSPITAL ENCOUNTER (OUTPATIENT)
Dept: RADIOLOGY | Facility: HOSPITAL | Age: 59
Discharge: HOME | End: 2024-10-28
Attending: ANESTHESIOLOGY | Admitting: ANESTHESIOLOGY
Payer: COMMERCIAL

## 2024-10-28 ENCOUNTER — HOSPITAL ENCOUNTER (OUTPATIENT)
Facility: HOSPITAL | Age: 59
Discharge: HOME | End: 2024-10-28
Attending: ANESTHESIOLOGY | Admitting: ANESTHESIOLOGY
Payer: COMMERCIAL

## 2024-10-28 DIAGNOSIS — M54.50 LOWER BACK PAIN: ICD-10-CM

## 2024-10-28 DIAGNOSIS — M47.816 LUMBAR SPONDYLOSIS: Primary | ICD-10-CM

## 2024-10-28 PROCEDURE — 64635 DESTROY LUMB/SAC FACET JNT: CPT | Mod: 50,PO | Performed by: ANESTHESIOLOGY

## 2024-10-28 PROCEDURE — 99152 MOD SED SAME PHYS/QHP 5/>YRS: CPT | Mod: ,,, | Performed by: ANESTHESIOLOGY

## 2024-10-28 PROCEDURE — 64636 DESTROY L/S FACET JNT ADDL: CPT | Mod: 50,PO | Performed by: ANESTHESIOLOGY

## 2024-10-28 PROCEDURE — 64635 DESTROY LUMB/SAC FACET JNT: CPT | Mod: 50,,, | Performed by: ANESTHESIOLOGY

## 2024-10-28 PROCEDURE — 64636 DESTROY L/S FACET JNT ADDL: CPT | Mod: 50,,, | Performed by: ANESTHESIOLOGY

## 2024-10-28 PROCEDURE — 63600175 PHARM REV CODE 636 W HCPCS: Mod: PO | Performed by: ANESTHESIOLOGY

## 2024-10-28 RX ORDER — TRIAMCINOLONE ACETONIDE 40 MG/ML
INJECTION, SUSPENSION INTRA-ARTICULAR; INTRAMUSCULAR
Status: DISCONTINUED | OUTPATIENT
Start: 2024-10-28 | End: 2024-10-28 | Stop reason: HOSPADM

## 2024-10-28 RX ORDER — MIDAZOLAM HYDROCHLORIDE 1 MG/ML
INJECTION INTRAMUSCULAR; INTRAVENOUS
Status: DISCONTINUED | OUTPATIENT
Start: 2024-10-28 | End: 2024-10-28 | Stop reason: HOSPADM

## 2024-10-28 RX ORDER — ALPRAZOLAM 0.5 MG/1
1 TABLET, ORALLY DISINTEGRATING ORAL ONCE AS NEEDED
Status: DISCONTINUED | OUTPATIENT
Start: 2024-10-28 | End: 2024-10-28 | Stop reason: HOSPADM

## 2024-10-28 RX ORDER — BUPIVACAINE HYDROCHLORIDE 2.5 MG/ML
INJECTION, SOLUTION EPIDURAL; INFILTRATION; INTRACAUDAL
Status: DISCONTINUED | OUTPATIENT
Start: 2024-10-28 | End: 2024-10-28 | Stop reason: HOSPADM

## 2024-10-28 RX ORDER — LIDOCAINE HYDROCHLORIDE 20 MG/ML
INJECTION, SOLUTION EPIDURAL; INFILTRATION; INTRACAUDAL; PERINEURAL
Status: DISCONTINUED | OUTPATIENT
Start: 2024-10-28 | End: 2024-10-28 | Stop reason: HOSPADM

## 2024-10-28 RX ORDER — CLONAZEPAM 1 MG/1
1 TABLET ORAL DAILY PRN
Qty: 30 TABLET | Refills: 5 | Status: SHIPPED | OUTPATIENT
Start: 2024-10-28 | End: 2024-10-28 | Stop reason: HOSPADM

## 2024-10-28 RX ORDER — FENTANYL CITRATE 50 UG/ML
INJECTION, SOLUTION INTRAMUSCULAR; INTRAVENOUS
Status: DISCONTINUED | OUTPATIENT
Start: 2024-10-28 | End: 2024-10-28 | Stop reason: HOSPADM

## 2024-10-28 RX ORDER — LIDOCAINE HYDROCHLORIDE 10 MG/ML
INJECTION, SOLUTION EPIDURAL; INFILTRATION; INTRACAUDAL; PERINEURAL
Status: DISCONTINUED | OUTPATIENT
Start: 2024-10-28 | End: 2024-10-28 | Stop reason: HOSPADM

## 2024-10-29 VITALS
DIASTOLIC BLOOD PRESSURE: 67 MMHG | BODY MASS INDEX: 31.36 KG/M2 | RESPIRATION RATE: 20 BRPM | WEIGHT: 177 LBS | OXYGEN SATURATION: 98 % | HEART RATE: 65 BPM | TEMPERATURE: 98 F | SYSTOLIC BLOOD PRESSURE: 120 MMHG | HEIGHT: 63 IN

## 2024-11-23 DIAGNOSIS — F31.81 BIPOLAR 2 DISORDER: ICD-10-CM

## 2024-11-25 DIAGNOSIS — E03.9 HYPOTHYROIDISM, UNSPECIFIED TYPE: ICD-10-CM

## 2024-11-25 RX ORDER — LEVOTHYROXINE SODIUM 75 UG/1
75 TABLET ORAL
Qty: 90 TABLET | Refills: 3 | Status: SHIPPED | OUTPATIENT
Start: 2024-11-25

## 2024-11-25 RX ORDER — ROSUVASTATIN CALCIUM 10 MG/1
10 TABLET, COATED ORAL DAILY
Qty: 90 TABLET | Refills: 1 | Status: SHIPPED | OUTPATIENT
Start: 2024-11-25

## 2024-11-25 RX ORDER — LAMOTRIGINE 25 MG/1
75 TABLET ORAL NIGHTLY
Qty: 270 TABLET | Refills: 0 | Status: SHIPPED | OUTPATIENT
Start: 2024-11-25 | End: 2025-02-23

## 2024-11-25 NOTE — TELEPHONE ENCOUNTER
Care Due:                  Date            Visit Type   Department     Provider  --------------------------------------------------------------------------------                                EP -                              PRIMARY      [facility]  Last Visit: 05-      CARE ([facility])   MEDICINE       Yesi Swartz                              EP -                              PRIMARY      [facility]  Next Visit: 12-      CARE ([facility])   [facility]       Yesi Swartz                                                            Last  Test          Frequency    Reason                     Performed    Due Date  --------------------------------------------------------------------------------    Lipid Panel.  12 months..  rosuvastatin.............  11- 11-    [facility] Embedded Care Due Messages. Reference number: [id number].   11/25/2024 1:40:17 PM CST

## 2024-11-26 ENCOUNTER — OFFICE VISIT (OUTPATIENT)
Dept: PAIN MEDICINE | Facility: CLINIC | Age: 59
End: 2024-11-26
Payer: COMMERCIAL

## 2024-11-26 ENCOUNTER — LAB VISIT (OUTPATIENT)
Dept: LAB | Facility: HOSPITAL | Age: 59
End: 2024-11-26
Attending: INTERNAL MEDICINE
Payer: COMMERCIAL

## 2024-11-26 VITALS
DIASTOLIC BLOOD PRESSURE: 72 MMHG | WEIGHT: 173.81 LBS | HEART RATE: 78 BPM | SYSTOLIC BLOOD PRESSURE: 126 MMHG | BODY MASS INDEX: 30.79 KG/M2

## 2024-11-26 DIAGNOSIS — G89.29 CHRONIC BILATERAL LOW BACK PAIN WITH RIGHT-SIDED SCIATICA: ICD-10-CM

## 2024-11-26 DIAGNOSIS — M47.816 LUMBAR SPONDYLOSIS: Primary | ICD-10-CM

## 2024-11-26 DIAGNOSIS — M54.51 VERTEBROGENIC LOW BACK PAIN: ICD-10-CM

## 2024-11-26 DIAGNOSIS — I10 ESSENTIAL HYPERTENSION: ICD-10-CM

## 2024-11-26 DIAGNOSIS — M54.41 CHRONIC BILATERAL LOW BACK PAIN WITH RIGHT-SIDED SCIATICA: ICD-10-CM

## 2024-11-26 DIAGNOSIS — K66.0 ABDOMINAL ADHESIONS: ICD-10-CM

## 2024-11-26 LAB
ALBUMIN SERPL BCP-MCNC: 4 G/DL (ref 3.5–5.2)
ALP SERPL-CCNC: 71 U/L (ref 40–150)
ALT SERPL W/O P-5'-P-CCNC: 21 U/L (ref 10–44)
ANION GAP SERPL CALC-SCNC: 7 MMOL/L (ref 8–16)
AST SERPL-CCNC: 19 U/L (ref 10–40)
BASOPHILS # BLD AUTO: 0.02 K/UL (ref 0–0.2)
BASOPHILS NFR BLD: 0.4 % (ref 0–1.9)
BILIRUB SERPL-MCNC: 0.7 MG/DL (ref 0.1–1)
BUN SERPL-MCNC: 11 MG/DL (ref 6–20)
CALCIUM SERPL-MCNC: 10.2 MG/DL (ref 8.7–10.5)
CHLORIDE SERPL-SCNC: 108 MMOL/L (ref 95–110)
CHOLEST SERPL-MCNC: 141 MG/DL (ref 120–199)
CHOLEST/HDLC SERPL: 3.1 {RATIO} (ref 2–5)
CO2 SERPL-SCNC: 28 MMOL/L (ref 23–29)
CREAT SERPL-MCNC: 0.9 MG/DL (ref 0.5–1.4)
DIFFERENTIAL METHOD BLD: NORMAL
EOSINOPHIL # BLD AUTO: 0.2 K/UL (ref 0–0.5)
EOSINOPHIL NFR BLD: 4.1 % (ref 0–8)
ERYTHROCYTE [DISTWIDTH] IN BLOOD BY AUTOMATED COUNT: 12.7 % (ref 11.5–14.5)
EST. GFR  (NO RACE VARIABLE): >60 ML/MIN/1.73 M^2
GLUCOSE SERPL-MCNC: 103 MG/DL (ref 70–110)
HCT VFR BLD AUTO: 47.5 % (ref 37–48.5)
HDLC SERPL-MCNC: 45 MG/DL (ref 40–75)
HDLC SERPL: 31.9 % (ref 20–50)
HGB BLD-MCNC: 15.4 G/DL (ref 12–16)
IMM GRANULOCYTES # BLD AUTO: 0.01 K/UL (ref 0–0.04)
IMM GRANULOCYTES NFR BLD AUTO: 0.2 % (ref 0–0.5)
LDLC SERPL CALC-MCNC: 74 MG/DL (ref 63–159)
LYMPHOCYTES # BLD AUTO: 1.1 K/UL (ref 1–4.8)
LYMPHOCYTES NFR BLD: 23.6 % (ref 18–48)
MCH RBC QN AUTO: 29 PG (ref 27–31)
MCHC RBC AUTO-ENTMCNC: 32.4 G/DL (ref 32–36)
MCV RBC AUTO: 90 FL (ref 82–98)
MONOCYTES # BLD AUTO: 0.4 K/UL (ref 0.3–1)
MONOCYTES NFR BLD: 9.4 % (ref 4–15)
NEUTROPHILS # BLD AUTO: 2.9 K/UL (ref 1.8–7.7)
NEUTROPHILS NFR BLD: 62.3 % (ref 38–73)
NONHDLC SERPL-MCNC: 96 MG/DL
NRBC BLD-RTO: 0 /100 WBC
PLATELET # BLD AUTO: 176 K/UL (ref 150–450)
PMV BLD AUTO: 11.8 FL (ref 9.2–12.9)
POTASSIUM SERPL-SCNC: 4 MMOL/L (ref 3.5–5.1)
PROT SERPL-MCNC: 6.8 G/DL (ref 6–8.4)
RBC # BLD AUTO: 5.31 M/UL (ref 4–5.4)
SODIUM SERPL-SCNC: 143 MMOL/L (ref 136–145)
TRIGL SERPL-MCNC: 110 MG/DL (ref 30–150)
TSH SERPL DL<=0.005 MIU/L-ACNC: 2.19 UIU/ML (ref 0.4–4)
WBC # BLD AUTO: 4.58 K/UL (ref 3.9–12.7)

## 2024-11-26 PROCEDURE — 3074F SYST BP LT 130 MM HG: CPT | Mod: CPTII,S$GLB,,

## 2024-11-26 PROCEDURE — 80053 COMPREHEN METABOLIC PANEL: CPT | Performed by: INTERNAL MEDICINE

## 2024-11-26 PROCEDURE — 1159F MED LIST DOCD IN RCRD: CPT | Mod: CPTII,S$GLB,,

## 2024-11-26 PROCEDURE — 36415 COLL VENOUS BLD VENIPUNCTURE: CPT | Mod: PO | Performed by: INTERNAL MEDICINE

## 2024-11-26 PROCEDURE — 80061 LIPID PANEL: CPT | Performed by: INTERNAL MEDICINE

## 2024-11-26 PROCEDURE — 3078F DIAST BP <80 MM HG: CPT | Mod: CPTII,S$GLB,,

## 2024-11-26 PROCEDURE — 99213 OFFICE O/P EST LOW 20 MIN: CPT | Mod: S$GLB,,,

## 2024-11-26 PROCEDURE — 3008F BODY MASS INDEX DOCD: CPT | Mod: CPTII,S$GLB,,

## 2024-11-26 PROCEDURE — 85025 COMPLETE CBC W/AUTO DIFF WBC: CPT | Performed by: INTERNAL MEDICINE

## 2024-11-26 PROCEDURE — 3044F HG A1C LEVEL LT 7.0%: CPT | Mod: CPTII,S$GLB,,

## 2024-11-26 PROCEDURE — 99999 PR PBB SHADOW E&M-EST. PATIENT-LVL III: CPT | Mod: PBBFAC,,,

## 2024-11-26 PROCEDURE — 84443 ASSAY THYROID STIM HORMONE: CPT | Performed by: INTERNAL MEDICINE

## 2024-11-26 NOTE — PROGRESS NOTES
[facility] Follow Up Evaluation      Referred by: No ref. provider found    PCP: Dr. Swartz    CC:   Chief Complaint   Patient presents with    Back Pain          11/26/2024    10:07 AM 8/22/2024    11:26 AM 5/16/2024    11:16 AM   Last 3 PDI Scores   Pain Disability Index (PDI) 22 38 28     Interval HPI 11/26/2024: Delaney Noel returns to the office for follow up.  She is s/p bilateral L4/5 and L5/S1 RFA on 10/28/2024 with 60% relief.  Overall, previous back pain has significantly improved.  She does report some left anterior abdomen/groin pain.  Only painful when walking, no pain at rest.  She denies any numbness, weakness, radicular symptoms or any new changes to her bowel bladder function.  She does have history of total hysterectomy.      HPI:   Delaney Noel is a 59 y.o. female who presents with back pain.  She is had chronic lower back pain for many years it has been gradually worsening.  Today her pain is 8/10, constant, aching, sharp with radiating pain down the back of the right leg stopping at the knee.  She denies any numbness or weakness.      Pain Intervention History:  - s/p L5/S1 ANUJA on 11/23/2022 with initially 100% relief and now 30% relief.  - s/p L5/S1 ANUJA on 07/25/2023 with 85% relief.  - s/p L5/S1 ANUJA on 06/04/2024 with 60% relief.   - s/p bilateral L4/5 and L5/S1 RFA on 10/28/2024 with 60% relief.      Past Spine Surgical History:      Past and current medications:  Antineuropathics:  NSAIDs: mobic, ibuprofen, toradol  Physical therapy: yes, currently   Antidepressants: gabapentin  Muscle relaxers: robaxin  Opioids:  Antiplatelets/Anticoagulants: aspirin    History:    Current Outpatient Medications:     albuterol (PROVENTIL/VENTOLIN HFA) 90 mcg/actuation inhaler, Inhale 2 puffs into the lungs every 6 (six) hours as needed for Wheezing. Rescue, Disp: 18 g, Rfl: 2    aspirin (ECOTRIN) 81 MG EC tablet, Take 1 tablet (81 mg total) by mouth once daily., Disp: 90 tablet, Rfl:  3    atenoloL (TENORMIN) 50 MG tablet, Take 1 tablet (50 mg total) by mouth once daily., Disp: 90 tablet, Rfl: 3    cetirizine (ZYRTEC) 10 MG tablet, Take 10 mg by mouth once daily. Take one(1) tab by mouth daily, Disp: , Rfl:     cholestyramine (QUESTRAN) 4 gram packet, Take 1 packet (4 g total) by mouth once daily., Disp: 90 packet, Rfl: 3    clonazePAM (KLONOPIN) 1 MG tablet, Take 1 tablet (1 mg total) by mouth daily as needed for Anxiety., Disp: 30 tablet, Rfl: 0    dicyclomine (BENTYL) 10 MG capsule, Take 1 capsule (10 mg total) by mouth 4 (four) times daily as needed (abdominal pain; diarrhea)., Disp: 360 capsule, Rfl: 3    fluticasone propionate (FLONASE) 50 mcg/actuation nasal spray, 2 sprays (100 mcg total) by Each Nostril route once daily., Disp: 48 g, Rfl: 3    gabapentin (NEURONTIN) 300 MG capsule, Take 1 capsule (300 mg total) by mouth every evening., Disp: 90 capsule, Rfl: 3    galcanezumab-gnlm (EMGALITY SYRINGE) 120 mg/mL Syrg, Inject 120 mg into the skin every 28 days., Disp: 1 mL, Rfl: 5    ibuprofen (ADVIL,MOTRIN) 800 MG tablet, Take 800 mg by mouth 3 (three) times daily as needed., Disp: , Rfl:     ketorolac (TORADOL) 10 mg tablet, Take 2 tabs (20mg) at the onset of a headache, if headache persists you may take 1 tab every 4 to 6 hours as needed. Do not exceed 40 mg per day., Disp: 20 tablet, Rfl: 5    lamoTRIgine (LAMICTAL) 25 MG tablet, Take 3 tablets (75 mg total) by mouth every evening., Disp: 270 tablet, Rfl: 0    levothyroxine (SYNTHROID) 75 MCG tablet, Take 1 tablet (75 mcg total) by mouth before breakfast., Disp: 90 tablet, Rfl: 3    methocarbamoL (ROBAXIN) 750 MG Tab, Take 1 tablet (750 mg total) by mouth nightly as needed., Disp: 90 tablet, Rfl: 2    rosuvastatin (CRESTOR) 10 MG tablet, Take 1 tablet (10 mg total) by mouth once daily., Disp: 90 tablet, Rfl: 1    sumatriptan (IMITREX STATDOSE) 6 mg/0.5 mL kit, Inject SQ at onset of migraine, can repeat in 1 hrs if needed.  No more than  twice per day or 3 days/wk., Disp: 6 mL, Rfl: 5    traZODone (DESYREL) 100 MG tablet, Take 1 tablet (100 mg total) by mouth nightly as needed for Insomnia., Disp: 90 tablet, Rfl: 0    venlafaxine (EFFEXOR-XR) 150 MG Cp24, Take 1 capsule (150 mg total) by mouth every morning., Disp: 90 capsule, Rfl: 0    Past Medical History:   Diagnosis Date    Allergy     Depression     Hx of colonic polyps 08/04/2014    per colonoscopy report    Hypertension     Migraine     Sleep apnea     Suicidal thoughts 12/2019    [facility] tx     Thyroid disease     Hypothyroid, MNG       Past Surgical History:   Procedure Laterality Date    COLONOSCOPY      COLONOSCOPY N/A 07/01/2020    Procedure: COLONOSCOPY;  Surgeon: Thiago Lozoya Jr., MD;  Location: [facility] ENDO;  Service: Endoscopy;  Laterality: N/A;    cystoscope      EPIDURAL STEROID INJECTION INTO LUMBAR SPINE N/A 11/23/2022    Procedure: Injection-steroid-epidural-lumbar L5/S1;  Surgeon: Chandler Pereira MD;  Location: [facility] OR;  Service: Pain Management;  Laterality: N/A;    EPIDURAL STEROID INJECTION INTO LUMBAR SPINE N/A 07/25/2023    Procedure: Injection-steroid-epidural-lumbar L5/S1;  Surgeon: Chandler Pereira MD;  Location: [facility] OR;  Service: Pain Management;  Laterality: N/A;    EPIDURAL STEROID INJECTION INTO LUMBAR SPINE N/A 06/04/2024    Procedure: Injection-steroid-epidural-lumbar  L5/S1;  Surgeon: Chandler Pereira MD;  Location: [facility] OR;  Service: Pain Management;  Laterality: N/A;    HYSTERECTOMY      JAMIE with BSO- age 42    INJECTION OF ANESTHETIC AGENT AROUND MEDIAL BRANCH NERVES INNERVATING LUMBAR FACET JOINT Bilateral 09/13/2024    Procedure: Block-nerve-medial branch-lumbar    L4/5, L5/S1;  Surgeon: Chandler Pereira MD;  Location: [facility] OR;  Service: Pain Management;  Laterality: Bilateral;    INJECTION OF ANESTHETIC AGENT AROUND MEDIAL BRANCH NERVES INNERVATING LUMBAR FACET JOINT Bilateral 10/2/2024    Procedure: Block-nerve-medial branch-lumbar   L4/5, L5/S1;   Surgeon: Chandler Pereira MD;  Location: [facility] OR;  Service: Pain Management;  Laterality: Bilateral;    OOPHORECTOMY      RADIOFREQUENCY ABLATION Bilateral 10/28/2024    Procedure: Radiofrequency Ablation- L4/5, L5/S1;  Surgeon: Chandler Pereira MD;  Location: [facility] OR;  Service: Pain Management;  Laterality: Bilateral;       Family History   Problem Relation Name Age of Onset    Heart disease Mother          had childhood rheumatic fever    Cancer Mother          uterine cancer    Cervical cancer Mother  28    Atrial fibrillation Father      Macular degeneration Maternal Grandmother      Cancer Maternal Grandmother          uterine cancer    Stroke Maternal Grandmother      Heart disease Maternal Grandmother  50        MI    Stroke Maternal Grandfather      Nephrolithiasis Neg Hx      Glaucoma Neg Hx         Social History     Socioeconomic History    Marital status:     Number of children: 2   Tobacco Use    Smoking status: Former     Current packs/day: 0.00     Average packs/day: 0.3 packs/day for 25.0 years (6.3 ttl pk-yrs)     Types: Cigarettes     Start date: 1990     Quit date: 2015     Years since quittin.4    Smokeless tobacco: Never    Tobacco comments:     had quit x 2   Substance and Sexual Activity    Alcohol use: Yes     Alcohol/week: 2.0 standard drinks of alcohol     Types: 2 Cans of beer per week     Comment: hardly ever    Drug use: No    Sexual activity: Yes     Partners: Male     Birth control/protection: None, See Surgical Hx     Social Drivers of Health     Financial Resource Strain: Low Risk  (2023)    Overall Financial Resource Strain (CARDIA)     Difficulty of Paying Living Expenses: Not hard at all   Food Insecurity: No Food Insecurity (2023)    Hunger Vital Sign     Worried About Running Out of Food in the Last Year: Never true     Ran Out of Food in the Last Year: Never true   Transportation Needs: No Transportation Needs (2023)    PRAPARE -  Transportation     Lack of Transportation (Medical): No     Lack of Transportation (Non-Medical): No   Physical Activity: Inactive (11/28/2023)    Exercise Vital Sign     Days of Exercise per Week: 0 days     Minutes of Exercise per Session: 0 min   Stress: Stress Concern Present (11/28/2023)    Citizen of [place] of Occupational Health - Occupational Stress Questionnaire     Feeling of Stress : Rather much   Housing Stability: Low Risk  (11/28/2023)    Housing Stability Vital Sign     Unable to Pay for Housing in the Last Year: No     Number of Places Lived in the Last Year: 1     Unstable Housing in the Last Year: No       Review of patient's allergies indicates:   Allergen Reactions    Hay fever and allergy relief      Patient states environmental allergies; NKDA       Review of Systems:  Positive for joint pain. Balance  of review of systems is negative.    Physical Exam:  Vitals:    11/26/24 1009   BP: 126/72   Pulse: 78   Weight: 78.8 kg (173 lb 13.3 oz)   PainSc:   5   PainLoc: Back             Body mass index is 30.79 kg/m².    Gen: NAD  Psych: mood appropriate for given condition  HEENT: eyes anicteric   CV: RRR, 2+ radial pulse  HEENT: anicteric   Respiratory: non-labored, no signs of respiratory distress  Abd: non-distended  Skin: warm, dry and intact.  Gait:  No antalgic gait.      Sensory:   Intact and symmetrical to light touch in L1-S1 dermatomes bilaterally.     Motor:           Right Left   L2/3 Iliacus Hip flexion  5  5   L3/4 Qudratus Femoris Knee Extension  5  5   L4/5 Tib Anterior Ankle Dorsiflexion   5  5   L5/S1 Extensor Hallicus Longus Great toe extension  5  5   S1/S2 Gastroc/Soleus Plantar Flexion  5  5        Right Left   Triceps DTR       Biceps DTR       Brachioradialis DTR       Patellar DTR 2+ 2+   Achilles DTR 0+ 2+   Cuevas Absent  Absent                       Provocative:  Increased pain with bilateral axial facet loading        Imaging:  MRI lumbar spine  11/8/22  FINDINGS:  Alignment: Mild levoconvex curvature of the lumbar spine.     Vertebral column: Vertebral body heights are maintained.  No evidence of an acute fracture or aggressive marrow replacement process. Multilevel disc degeneration, most pronounced at L5-S1 with marked disc space narrowing, degenerative endplate change and marginal osteophyte formation.  Stable probable hemangioma within the L3 vertebral body.     Cord: Normal.  Conus terminates at L1.     Degenerative findings:     T12-L1: No significant disc bulge.  Mild bilateral facet arthropathy.  No neural foraminal or spinal canal stenosis.  L1-L2: No significant disc abnormality.  Mild bilateral facet arthropathy.  There is no neural foraminal stenosis.  There is no spinal canal stenosis.  L2-L3: No significant disc abnormality.  Mild bilateral facet arthropathy.  There is no neural foraminal stenosis.  There is no spinal canal stenosis.  L3-L4: Minimal diffuse disc bulge.  Mild bilateral facet arthropathy and ligamentum flavum thickening.  Mild left neural foraminal stenosis.  There is no spinal canal stenosis.  L4-L5: Mild diffuse disc bulge with mild broad-based right extraforaminal disc protrusion through an annular fissure.  Moderate bilateral facet arthropathy.  Ligamentum flavum thickening.  Mild bilateral neural foraminal stenosis.  Mild spinal canal stenosis.  L5-S1: Marked disc space narrowing with osteophytic ridging and diffuse disc herniation.  Moderate bilateral facet arthropathy.  Ligamentum flavum thickening.  Moderate left and mild-to-moderate right neural foraminal stenosis.  Mild spinal canal stenosis.     Paraspinal muscles & soft tissues: No significant abnormality.    Labs:  Lab Results   Component Value Date    HGBA1C 5.7 (H) 05/28/2024       Lab Results   Component Value Date    WBC 6.49 05/23/2024    HGB 14.2 05/23/2024    HCT 43.3 05/23/2024    MCV 90 05/23/2024     05/23/2024           Assessment:   Problem List  Items Addressed This Visit    None  Visit Diagnoses       Lumbar spondylosis    -  Primary    Vertebrogenic low back pain        Chronic bilateral low back pain with right-sided sciatica        Abdominal adhesions                      59 y.o. year old female with PMH bipolar, HTN, hypothyroidism who presents with back pain.  She is had chronic lower back pain for many years it has been gradually worsening.  Today her pain is 8/10, constant, aching, sharp with radiating pain down the back of the right leg stopping at the knee.  She denies any numbness or weakness.    11/26/2024: Delaney Noel returns to the office for follow up.  She is s/p bilateral L4/5 and L5/S1 RFA on 10/28/2024 with 60% relief.  Overall, previous back pain has significantly improved.  She does report some left anterior abdomen/groin pain.  Only painful when walking, no pain at rest.  She denies any numbness, weakness, radicular symptoms or any new changes to her bowel bladder function.  She does have history of total hysterectomy.      - on exam she is full strength in her lower extremities, no increased pain with left-sided JOSE FADIR  - She is s/p bilateral L4/5 and L5/S1 RFA on 10/28/2024 with 60% relief.   - I independently reviewed her lumbar MRI is consistent with multilevel bilateral facet arthropathy worse at L4-5 and L5-S1 as well as diffuse central disc herniation at L5-S1 causing some mild central canal narrowing and bilateral foraminal narrowing.  Modic type 2 endplate changes at L5/S1.  - she responded very well to the lumbar RFA.  Previous back pain has significantly improved.  - I do not believe her left abdominal pain is originating from her left hip joint nor from her lumbar spine.  She does have history of total hysterectomy, she may be having some pain from adhesions or scar tissue from surgery.  It has improved in the past with physical therapy.  However she would like to hold off at this time.  Pain is manageable.  -  follow up as needed, can repeat lumbar RFA in the future when indicated.  If left a bottom hole pain persists can consider updating her lumbar MRI.      : Not applicable    This note was completed with dictation software and grammatical errors may exist.

## 2024-11-27 ENCOUNTER — PATIENT MESSAGE (OUTPATIENT)
Dept: NEUROLOGY | Facility: CLINIC | Age: 59
End: 2024-11-27
Payer: COMMERCIAL

## 2024-11-27 ENCOUNTER — PATIENT MESSAGE (OUTPATIENT)
Dept: FAMILY MEDICINE | Facility: CLINIC | Age: 59
End: 2024-11-27
Payer: COMMERCIAL

## 2024-12-04 ENCOUNTER — PATIENT MESSAGE (OUTPATIENT)
Dept: FAMILY MEDICINE | Facility: CLINIC | Age: 59
End: 2024-12-04

## 2024-12-05 ENCOUNTER — PATIENT MESSAGE (OUTPATIENT)
Dept: NEUROLOGY | Facility: CLINIC | Age: 59
End: 2024-12-05
Payer: COMMERCIAL

## 2024-12-18 DIAGNOSIS — F51.02 INSOMNIA DUE TO PSYCHOLOGICAL STRESS: ICD-10-CM

## 2024-12-19 RX ORDER — TRAZODONE HYDROCHLORIDE 100 MG/1
100 TABLET ORAL NIGHTLY PRN
Qty: 90 TABLET | Refills: 0 | Status: SHIPPED | OUTPATIENT
Start: 2024-12-19 | End: 2025-03-19

## 2024-12-27 NOTE — PROGRESS NOTES
Outpatient Psychiatry Follow-Up Visit    Clinical Status of Patient: Outpatient (Ambulatory)  12/27/2024    The patient location is:  [place], [place]  The patient phone number is: [phone]   Visit type: Virtual visit with synchronous audio and video  Each patient to whom he or she provides medical services by telemedicine is:  (1) informed of the relationship between the physician and patient and the respective role of any other health care provider with respect to management of the patient; and (2) notified that he or she may decline to receive medical services by telemedicine and may withdraw from such care at any time.     Chief Complaint: Pt is a 59 y.o. female who presents today for a follow-up. Met with patient.       Interval History and Content of Current Session:  Interim Events/Subjective Report/Content of Current Session:  follow up appointment.    Pt is a 59 y.o. 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     Past Psychiatric hx:   Pt. is a 59 y.o. female with a past psychiatric hx of Major depressive disorder, recurrent episode, moderate, Bipolar 2 disorder, HUNTER (generalized anxiety disorder) presenting to the clinic for an initial evaluation and treatment. Past medical history outlined below; she is currently taking clonazePAM (KLONOPIN), lamoTRIgine (LAMICTAL), QUEtiapine (SEROQUEL), EScitalopram oxalate (LEXAPRO), prescribed and managed by Dr. Swartz; she reports that these medications have not worked as well recently.     10/1/24: Pt presents to OP Psychiatry for routine follow-up for treatment/medication management of Bipolar, PTSD, MDD, HUNTER, Insomnia. Pt reports anxiety and depression have improved after Effexor increase, denies PTSD symptoms. States all symptoms are  effectively managed, denies need for any medication changes at this time. States her handwriting has gotten shaky for the past 4 months, we discussed contacting her [facility] Neurologist for direction or to get a consult r/t this. She states it could be r/t her back issues also. Overall reports improvements with her mental health. Pt denies SI/HI/AVH, self-harm, plan, or intent. Pt verbalizes understanding of medication instructions through teach back. No other issues, concerns, or problems, will reassess symptoms and medications in 3 months or PRN if symptoms worsen.     Past Medical hx:   Past Medical History:   Diagnosis Date    Allergy     Depression     Hx of colonic polyps 08/04/2014    per colonoscopy report    Hypertension     Migraine     Sleep apnea     Suicidal thoughts 12/2019    [facility] tx     Thyroid disease     Hypothyroid, MNG        Interim hx:  Medication changes last visit:     1. Continue clonazePAM (KLONOPIN) 1 MG tablet - Take 1 tablet (1 mg total) by mouth daily as needed for Anxiety. Discussed risk of decreased RT, sedation, addictive potential, and not to mix with alcohol.   2. Continue lamoTRIgine (LAMICTAL) 25 MG tablet - Take 3 tablets (75 mg total) by mouth once daily at bedtime for bipolar disorder.  3. Continue traZODone (DESYREL) 100 MG tablet - Take 1 tablet (100 mg total) by mouth nightly as needed for Insomnia.  4. Continue venlafaxine (EFFEXOR-XR) 150 MG 24 hr capsule - Take 1 capsule (150 mg total) by mouth once daily for depression, anxiety, PTSD. Discussed potential for GI side effects, sexual dysfunction, mood destabilization, headaches.    Anxiety: Manageable  Depression: Good, no issues  Sleep: Improved, no issues  Appetite: Same, no issues     Denies suicidal/homicidal ideations.  Denies hopelessness/worthlessness.    Denies auditory/visual hallucinations.      Tobacco:  quit 5 years ago  Alcohol:  Rarely  Drug use:  denies  Caffeine:  1 cup daily, rarely a coke        Review of Systems   PSYCHIATRIC: Pertinent items are noted in the narrative.        CONSTITUTIONAL: weight stable        M/S: no pain today         ENT: no allergies noted today        ABD: no n/v/d     Past Medical, Family and Social History: The patient's past medical, family and social history have been reviewed and updated as appropriate within the electronic medical record. See encounter notes.     Medication Compliance: yes      Side effects: tolerates     Risk Parameters:  Patient reports no suicidal ideation  Patient reports no homicidal ideation  Patient reports no self-injurious behavior  Patient reports no violent behavior     Exam (detailed: at least 9 elements; comprehensive: all 15 elements)   Constitutional  Vitals:  Most recent vital signs, dated less than 90 days prior to this appointment, were reviewed.     General:  unremarkable, age appropriate, casual attire, good eye contact, good rapport       Musculoskeletal  Muscle Strength/Tone:  no flaccidity, no tremor    Gait & Station:  normal      Psychiatric                       Speech:  normal tone, normal rate, rhythm, and volume   Mood & Affect:   Euthymic, congruent, appropriate         Thought Process:   Goal directed; Linear    Associations:   intact   Thought Content:   No SI/HI, delusions, or paranoia, no AV/VH   Insight & Judgement:   Good, adequate to circumstances   Orientation:   grossly intact; alert and oriented x 4    Memory:  intact for content of interview    Language:  grossly intact, can repeat    Attention Span  : Grossly intact for content of interview   Fund of Knowledge:   intact and appropriate to age and level of education        Assessment and Diagnosis   Status/Progress: Based on the examination today, the patient's problem(s) is/are under fair control.  New problems have not been presented today. Comorbidities are not currently complicating management of the primary condition.      Impression:  Pt presents to OP  Psychiatry for routine follow-up for treatment/medication management of Bipolar, PTSD, MDD, HUNTER, Insomnia. Pt reports all medications effectively treating her symptoms, discussed taking over her Klonopin, pt to contact this provider when running low. Pt has plans to go to [place] for the month of March, instructed pt to take her Klonopin sparingly as she will be out of the country when her March refill expires. Pt in agreement. Denies need for any medication changes at this time. Pt denies SI/HI/AVH, self-harm, plan, or intent. Pt verbalizes understanding of medication instructions through teach back. No other issues, concerns, or problems, will reassess symptoms and medications in 3 months or PRN if symptoms worsen.      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 Continue clonazePAM (KLONOPIN) 1 MG tablet - Take 1 tablet (1 mg total) by mouth daily as needed for Anxiety. Discussed risk of decreased RT, sedation, addictive potential, and not to mix with alcohol.      2. Continue lamoTRIgine (LAMICTAL) 25 MG tablet - Take 3 tablets (75 mg total) by mouth once daily at bedtime for bipolar disorder.     3. Continue traZODone (DESYREL) 100 MG tablet - Take 1 tablet (100 mg total) by mouth nightly as needed for Insomnia.     4. Continue venlafaxine (EFFEXOR-XR) 150 MG 24 hr capsule - Take 1 capsule (150 mg total) by mouth once daily for depression, anxiety, PTSD. Discussed potential for GI side effects, sexual dysfunction, mood destabilization, headaches.     5. Call to report any worsening of symptoms or problems with the medication. Pt instructed to go to ER with thoughts of harming self, others.     6. Patient given contact # for psychotherapists at [facility] and  also instructed she may check with insurance for list of providers.          Labs: none         Return to clinic:      3 months or PRN if symptoms worsen    -Spent 30min face to face with the pt; >50% time spent in counseling   -Supportive therapy and psychoeducation provided  -R/B/SE's of medications discussed with the pt who expresses understanding and chooses to take medications as prescribed.   -Pt instructed to call clinic, 911 or go to nearest emergency room if sxs worsen or pt is in   crisis. The pt expresses understanding.      Toya Hauser NP  Psychiatric-Mental Health Nurse Practitioner  Department of Psychiatry    [facility] [place] [address] [place] [address]  Phone:  [phone]  Fax: [phone]

## 2024-12-28 DIAGNOSIS — F41.1 GAD (GENERALIZED ANXIETY DISORDER): ICD-10-CM

## 2024-12-28 DIAGNOSIS — F43.12 CHRONIC POST-TRAUMATIC STRESS DISORDER (PTSD): ICD-10-CM

## 2024-12-28 DIAGNOSIS — F33.1 MAJOR DEPRESSIVE DISORDER, RECURRENT EPISODE, MODERATE: ICD-10-CM

## 2024-12-30 RX ORDER — VENLAFAXINE HYDROCHLORIDE 150 MG/1
150 CAPSULE, EXTENDED RELEASE ORAL EVERY MORNING
Qty: 90 CAPSULE | Refills: 0 | Status: SHIPPED | OUTPATIENT
Start: 2024-12-30 | End: 2025-03-30

## 2024-12-31 ENCOUNTER — OFFICE VISIT (OUTPATIENT)
Dept: PSYCHIATRY | Facility: CLINIC | Age: 59
End: 2024-12-31
Payer: COMMERCIAL

## 2024-12-31 DIAGNOSIS — F33.1 MAJOR DEPRESSIVE DISORDER, RECURRENT EPISODE, MODERATE: ICD-10-CM

## 2024-12-31 DIAGNOSIS — F51.02 INSOMNIA DUE TO PSYCHOLOGICAL STRESS: ICD-10-CM

## 2024-12-31 DIAGNOSIS — F41.1 GAD (GENERALIZED ANXIETY DISORDER): ICD-10-CM

## 2024-12-31 DIAGNOSIS — F31.81 BIPOLAR 2 DISORDER: ICD-10-CM

## 2024-12-31 DIAGNOSIS — F43.12 CHRONIC POST-TRAUMATIC STRESS DISORDER (PTSD): Primary | ICD-10-CM

## 2025-01-13 ENCOUNTER — OFFICE VISIT (OUTPATIENT)
Dept: FAMILY MEDICINE | Facility: CLINIC | Age: 60
End: 2025-01-13
Payer: COMMERCIAL

## 2025-01-13 VITALS
OXYGEN SATURATION: 97 % | SYSTOLIC BLOOD PRESSURE: 124 MMHG | HEIGHT: 63 IN | RESPIRATION RATE: 18 BRPM | WEIGHT: 173.75 LBS | BODY MASS INDEX: 30.79 KG/M2 | DIASTOLIC BLOOD PRESSURE: 88 MMHG | HEART RATE: 87 BPM | TEMPERATURE: 98 F

## 2025-01-13 DIAGNOSIS — F51.04 PSYCHOPHYSIOLOGICAL INSOMNIA: ICD-10-CM

## 2025-01-13 DIAGNOSIS — F31.81 BIPOLAR 2 DISORDER: ICD-10-CM

## 2025-01-13 DIAGNOSIS — F33.1 MAJOR DEPRESSIVE DISORDER, RECURRENT EPISODE, MODERATE: ICD-10-CM

## 2025-01-13 DIAGNOSIS — E66.09 CLASS 1 OBESITY DUE TO EXCESS CALORIES WITH SERIOUS COMORBIDITY AND BODY MASS INDEX (BMI) OF 32.0 TO 32.9 IN ADULT: ICD-10-CM

## 2025-01-13 DIAGNOSIS — F41.1 GAD (GENERALIZED ANXIETY DISORDER): ICD-10-CM

## 2025-01-13 DIAGNOSIS — G47.33 OSA (OBSTRUCTIVE SLEEP APNEA): ICD-10-CM

## 2025-01-13 DIAGNOSIS — M47.27 OSTEOARTHRITIS OF SPINE WITH RADICULOPATHY, LUMBOSACRAL REGION: ICD-10-CM

## 2025-01-13 DIAGNOSIS — E78.5 HYPERLIPIDEMIA, UNSPECIFIED HYPERLIPIDEMIA TYPE: ICD-10-CM

## 2025-01-13 DIAGNOSIS — I10 ESSENTIAL HYPERTENSION: ICD-10-CM

## 2025-01-13 DIAGNOSIS — R15.9 INCONTINENCE OF FECES, UNSPECIFIED FECAL INCONTINENCE TYPE: ICD-10-CM

## 2025-01-13 DIAGNOSIS — J30.9 CHRONIC ALLERGIC RHINITIS: ICD-10-CM

## 2025-01-13 DIAGNOSIS — R91.1 LUNG NODULE: ICD-10-CM

## 2025-01-13 DIAGNOSIS — F43.10 PTSD (POST-TRAUMATIC STRESS DISORDER): ICD-10-CM

## 2025-01-13 DIAGNOSIS — F41.0 PANIC ATTACK: ICD-10-CM

## 2025-01-13 DIAGNOSIS — Z00.00 WELL ADULT EXAM: Primary | ICD-10-CM

## 2025-01-13 DIAGNOSIS — I25.118 CORONARY ARTERY DISEASE INVOLVING NATIVE CORONARY ARTERY OF NATIVE HEART WITH OTHER FORM OF ANGINA PECTORIS: ICD-10-CM

## 2025-01-13 DIAGNOSIS — E66.811 CLASS 1 OBESITY DUE TO EXCESS CALORIES WITH SERIOUS COMORBIDITY AND BODY MASS INDEX (BMI) OF 32.0 TO 32.9 IN ADULT: ICD-10-CM

## 2025-01-13 DIAGNOSIS — Z12.31 ENCOUNTER FOR SCREENING MAMMOGRAM FOR MALIGNANT NEOPLASM OF BREAST: ICD-10-CM

## 2025-01-13 DIAGNOSIS — R73.03 PREDIABETES: ICD-10-CM

## 2025-01-13 DIAGNOSIS — G43.009 MIGRAINE WITHOUT AURA AND WITHOUT STATUS MIGRAINOSUS, NOT INTRACTABLE: ICD-10-CM

## 2025-01-13 PROCEDURE — 1160F RVW MEDS BY RX/DR IN RCRD: CPT | Mod: CPTII,S$GLB,, | Performed by: INTERNAL MEDICINE

## 2025-01-13 PROCEDURE — 3079F DIAST BP 80-89 MM HG: CPT | Mod: CPTII,S$GLB,, | Performed by: INTERNAL MEDICINE

## 2025-01-13 PROCEDURE — 99396 PREV VISIT EST AGE 40-64: CPT | Mod: S$GLB,,, | Performed by: INTERNAL MEDICINE

## 2025-01-13 PROCEDURE — 3008F BODY MASS INDEX DOCD: CPT | Mod: CPTII,S$GLB,, | Performed by: INTERNAL MEDICINE

## 2025-01-13 PROCEDURE — 1159F MED LIST DOCD IN RCRD: CPT | Mod: CPTII,S$GLB,, | Performed by: INTERNAL MEDICINE

## 2025-01-13 PROCEDURE — 3074F SYST BP LT 130 MM HG: CPT | Mod: CPTII,S$GLB,, | Performed by: INTERNAL MEDICINE

## 2025-01-13 NOTE — PROGRESS NOTES
Subjective:       Patient ID: Delaney Noel is a 59 y.o. female.    Medication List with Changes/Refills   Current Medications    ALBUTEROL (PROVENTIL/VENTOLIN HFA) 90 MCG/ACTUATION INHALER    Inhale 2 puffs into the lungs every 6 (six) hours as needed for Wheezing. Rescue    ASPIRIN (ECOTRIN) 81 MG EC TABLET    Take 1 tablet (81 mg total) by mouth once daily.    ATENOLOL (TENORMIN) 50 MG TABLET    Take 1 tablet (50 mg total) by mouth once daily.    CETIRIZINE (ZYRTEC) 10 MG TABLET    Take 10 mg by mouth once daily. Take one(1) tab by mouth daily    CHOLESTYRAMINE (QUESTRAN) 4 GRAM PACKET    Take 1 packet (4 g total) by mouth once daily.    CLONAZEPAM (KLONOPIN) 1 MG TABLET    Take 1 tablet (1 mg total) by mouth daily as needed for Anxiety.    DICYCLOMINE (BENTYL) 10 MG CAPSULE    Take 1 capsule (10 mg total) by mouth 4 (four) times daily as needed (abdominal pain; diarrhea).    FLUTICASONE PROPIONATE (FLONASE) 50 MCG/ACTUATION NASAL SPRAY    2 sprays (100 mcg total) by Each Nostril route once daily.    GABAPENTIN (NEURONTIN) 300 MG CAPSULE    Take 1 capsule (300 mg total) by mouth every evening.    GALCANEZUMAB-GNLM (EMGALITY SYRINGE) 120 MG/ML SYRG    Inject 120 mg into the skin every 28 days.    IBUPROFEN (ADVIL,MOTRIN) 800 MG TABLET    Take 800 mg by mouth 3 (three) times daily as needed.    KETOROLAC (TORADOL) 10 MG TABLET    Take 2 tabs (20mg) at the onset of a headache, if headache persists you may take 1 tab every 4 to 6 hours as needed. Do not exceed 40 mg per day.    LAMOTRIGINE (LAMICTAL) 25 MG TABLET    Take 3 tablets (75 mg total) by mouth every evening.    LEVOTHYROXINE (SYNTHROID) 75 MCG TABLET    Take 1 tablet (75 mcg total) by mouth before breakfast.    METHOCARBAMOL (ROBAXIN) 750 MG TAB    Take 1 tablet (750 mg total) by mouth nightly as needed.    ROSUVASTATIN (CRESTOR) 10 MG TABLET    Take 1 tablet (10 mg total) by mouth once daily.    TRAZODONE (DESYREL) 100 MG TABLET    Take 1 tablet (100 mg  total) by mouth nightly as needed for Insomnia.    VENLAFAXINE (EFFEXOR-XR) 150 MG CP24    Take 1 capsule (150 mg total) by mouth every morning.       Chief Complaint: Follow-up  She is here today to f/u on chronic medical issues.      She has hypertension and is taking atenolol 50 mg qday.  She had a positive stress test and then subsequent cath (do not have records) and she reports she had non-obstructive CAD. Nuclear stress test on 12/2023 was negative. She denies chest pain or shortness of breath.      She has hyperlipidemia and is taking crestor 10 mg qday. Her lipids on 11/2024 were 141/110/45/74.  She is on aspirin daily.        She has hypothyroid and is taking levothyroxine 75 mcg daily.  Her TSH on 11/2024 was normal. Thyroid u/s on 12/2023 was stable.       She has a small 4 mm lung nodule seen on CT on 7/2014. Repeat CT on on 10/2022 was unchanged. No further imaging needed.      She has fatty liver seen on CT on 12/2019. She has normal LFTs on 11/2024.      She has IBS with diarrhea but denies any active symptoms. She has been able to stop bentyl. She is now having fecal incontinence and was seen by GI on 9/2024. Given stools samples (not done) and she is scheduled for a colonoscopy. She was given questran but does not use.      She has chronic migraines and is followed by neurology.  She is taking emgality monthly and toradol as needed. Symptoms associated with migraines are sensitive to light with nausea, dizziness and confusion.  She reports having increased headaches have been well controlled and infrequent. She was seen by neurology on 8/2024 and will f/u in 6 months.     She has LOUISE and is not using cpap regularly. Diagnosed with sleep study on 12/2022.      She has multiple psychiatric issues. She has major depression with suicidal ideations (recent hospitalization for suicidal thoughts on 12/3/2019), anxiety with panic attack, bipolar type 2, PTSD with insomnia and OCD. She continues on klonopin  1 mg 1/2 pill bid, lamictal 75 mg daily and effexor 150 mg daily and trazodone 100 mg daily. She continues to follow with psychiatry. She feels this controls her symptoms and denies any suicidal ideations or panic attacks.      She has chronic low back pain since 10/2017. Around the time it was diagnosed she was having pain down her right leg and trouble walking.  She had an MRI of lumbar and cervical spine in 1/2019 showing mild spondylotic changes in L4-L5, L5-S1 and mild disease in cervical vertebra. She completed  PT.  She is taking mobic 15 mg qam and robaxin 750 mg qhs PRN muscle spasms and gabapentin 300 mg when she has radicular pain.  She reports pain worse with prolonged sitting or driving, and better with moving, home exercises, mobic and gabapentin 300 mg nightly.  Rated 1/10 to 10/10.  No weakness but her pain does radiate down her right leg.  She underwent a RFA of L4-5 and L5--S1 on 10/2024 with some relief of her pain. She continues to have left sided hip pain.      She lives with her  and feels safe at home. She does not exercise.  She is no longer working. She enjoys traveling in her RV.      Colonoscopy---7/2020  Mammogram----3/2024 neg   DEXA---3/2024 osteopenia with fracture risk  of 11%, hip fracture risk of 0.6% (Tv -1.5, Tf -1.0)  Pap-----12/2017 neg HPV neg---JAMIE  Tdap---9/2019  Influenza vaccine---12/2024  Prevnar 20--- 4/2023  Shingles vaccine-----10/2022, 10/2021  Covid vaccine----4 doses       Review of Systems   Constitutional:  Negative for appetite change, fatigue, fever and unexpected weight change.   HENT:  Negative for congestion, ear pain, hearing loss, sore throat and trouble swallowing.    Eyes:  Negative for pain and visual disturbance.   Respiratory:  Negative for cough, chest tightness, shortness of breath and wheezing.    Cardiovascular:  Positive for leg swelling. Negative for chest pain and palpitations.   Gastrointestinal:  Positive for diarrhea. Negative for  "abdominal pain, blood in stool, constipation, nausea and vomiting.   Endocrine: Negative for polyuria.   Genitourinary:  Negative for dysuria and hematuria.   Musculoskeletal:  Positive for back pain. Negative for arthralgias and myalgias.   Skin:  Negative for rash.   Allergic/Immunologic: Positive for environmental allergies.   Neurological:  Positive for dizziness and headaches. Negative for weakness and numbness.   Hematological:  Does not bruise/bleed easily.   Psychiatric/Behavioral:  Positive for dysphoric mood and sleep disturbance. Negative for suicidal ideas. The patient is nervous/anxious.        Objective:      Vitals:    01/13/25 0935   BP: 124/88   BP Location: Right arm   Patient Position: Sitting   Pulse: 87   Resp: 18   Temp: 97.5 °F (36.4 °C)   SpO2: 97%   Weight: 78.8 kg (173 lb 11.6 oz)   Height: 5' 3" (1.6 m)     Body mass index is 30.77 kg/m².  Physical Exam    General appearance: No acute distress, cooperative  Eyes: PERRL, EOMI, conjunctiva clear  Ears: normal external ear and pinna, tm clear without drainage, canals clear  Nose: Normal mucosa without drainage  Throat: no exudates or erythema, tonsils not enlarged  Mouth: no sores or lesions, moist mucous membranes  Neck: FROM, soft, supple, no thyromegaly, no bruits  Lymph: no anterior or posterior cervical adenopathy  Heart::  Regular rate and rhythm, no murmur  Lung: Clear to ascultation bilaterally, no wheezing, no rales, no rhonchi, no distress  Abdomen: Soft, nontender, no distention, no hepatosplenomegaly, bowel sounds normal, no guarding, no rebound, no peritoneal signs  Skin: no rashes, no lesions  Extremities: no edema, no cyanosis  Neuro: CN 2-12 intact, 5/5 muscle strength upper and lower extremity bilaterally, 2+ DTRs UE and LE bilaterally, normal gait  Peripheral pulses: 2+ pedal pulses bilaterally, good perfusion and color  Musculoskeletal: FROM, good strenth, no tenderness  Joint: normal appearance, no swelling, no warmth, no " deformity in all joints    Assessment:       1. Well adult exam    2. Coronary artery disease involving native coronary artery of native heart with other form of angina pectoris    3. Essential hypertension    4. Hyperlipidemia, unspecified hyperlipidemia type    5. Lung nodule    6. Chronic allergic rhinitis    7. Prediabetes    8. Incontinence of feces, unspecified fecal incontinence type    9. Migraine without aura and without status migrainosus, not intractable    10. Bipolar 2 disorder    11. Major depressive disorder, recurrent episode, moderate    12. PTSD (post-traumatic stress disorder)    13. HUNTER (generalized anxiety disorder)    14. Panic attack    15. Psychophysiological insomnia    16. LOUISE (obstructive sleep apnea)    17. Osteoarthritis of spine with radiculopathy, lumbosacral region    18. Class 1 obesity due to excess calories with serious comorbidity and body mass index (BMI) of 32.0 to 32.9 in adult    19. Encounter for screening mammogram for malignant neoplasm of breast        Plan:       Well adult exam  She is UTD with her labs, mammogram and vaccines    Coronary artery disease involving native coronary artery of native heart with other form of angina pectoris  Stable and no active symptoms   -     CBC Auto Differential; Future; Expected date: 01/13/2025  -     Comprehensive Metabolic Panel; Future; Expected date: 01/13/2025    Essential hypertension  Well controlled and continue current regimen.     Hyperlipidemia, unspecified hyperlipidemia type  Good control on crestor and aspirin    Lung nodule  No further imaging needed    Chronic allergic rhinitis  Continue flonase    Prediabetes  Mild and will recheck HbA1c with next labs.   -     Hemoglobin A1C; Future; Expected date: 01/13/2025    Incontinence of feces, unspecified fecal incontinence type  Scheduled for a coloscopy and followed by GI    Migraine without aura and without status migrainosus, not intractable  Improved and continue to  follow with neurology. She will also discuss her tremor    Bipolar 2 disorder  Well controlled and continue current regimen.     Major depressive disorder, recurrent episode, moderate  Good control on effexor and lamictal    PTSD (post-traumatic stress disorder)  Stable    HUNTER (generalized anxiety disorder) with panic attacks.   Stable on klonopin that is managed by psychiatry    Psychophysiological insomnia  Good control on trazodone    Osteoarthritis of spine with radiculopathy, lumbosacral region  Stable on gabapentin and robaxin. Continue to follow with pain clinic    Class 1 obesity due to excess calories with serious comorbidity and body mass index (BMI) of 32.0 to 32.9 in adult  Long discussion on the benefits of healthy eating and regular exercise to help lose weight and help control hypertension and hyperlipidemia.     Encounter for screening mammogram for malignant neoplasm of breast  -     Mammo Digital Screening [facility] son/ [facility]; Future; Expected date: 01/13/2025    Follow up in about 6 months (around 7/13/2025) for chronic medical issues.

## 2025-01-24 ENCOUNTER — TELEPHONE (OUTPATIENT)
Dept: SURGERY | Facility: HOSPITAL | Age: 60
End: 2025-01-24
Payer: COMMERCIAL

## 2025-01-24 NOTE — TELEPHONE ENCOUNTER
Pt is scheduled for colonoscopy on Mon., 1/27/2025 - pt does not have prep instructions - can you please send those instructions to pt's [facility] portal

## 2025-01-27 ENCOUNTER — ANESTHESIA (OUTPATIENT)
Dept: ENDOSCOPY | Facility: HOSPITAL | Age: 60
End: 2025-01-27
Payer: COMMERCIAL

## 2025-01-27 ENCOUNTER — ANESTHESIA EVENT (OUTPATIENT)
Dept: ENDOSCOPY | Facility: HOSPITAL | Age: 60
End: 2025-01-27
Payer: COMMERCIAL

## 2025-01-27 ENCOUNTER — HOSPITAL ENCOUNTER (OUTPATIENT)
Facility: HOSPITAL | Age: 60
Discharge: HOME | End: 2025-01-27
Attending: INTERNAL MEDICINE | Admitting: INTERNAL MEDICINE
Payer: COMMERCIAL

## 2025-01-27 VITALS
BODY MASS INDEX: 29.41 KG/M2 | HEIGHT: 63 IN | TEMPERATURE: 98 F | OXYGEN SATURATION: 94 % | WEIGHT: 166 LBS | DIASTOLIC BLOOD PRESSURE: 61 MMHG | RESPIRATION RATE: 14 BRPM | SYSTOLIC BLOOD PRESSURE: 112 MMHG | HEART RATE: 77 BPM

## 2025-01-27 DIAGNOSIS — R19.7 DIARRHEA: ICD-10-CM

## 2025-01-27 PROCEDURE — D9220A PRA ANESTHESIA: Mod: ANES,,, | Performed by: ANESTHESIOLOGY

## 2025-01-27 PROCEDURE — 88305 TISSUE EXAM BY PATHOLOGIST: CPT | Mod: PO | Performed by: PATHOLOGY

## 2025-01-27 PROCEDURE — 88305 TISSUE EXAM BY PATHOLOGIST: CPT | Mod: 26,,, | Performed by: PATHOLOGY

## 2025-01-27 PROCEDURE — 63600175 PHARM REV CODE 636 W HCPCS: Mod: PO | Performed by: NURSE ANESTHETIST, CERTIFIED REGISTERED

## 2025-01-27 PROCEDURE — 27201012 HC FORCEPS, HOT/COLD, DISP: Mod: PO | Performed by: INTERNAL MEDICINE

## 2025-01-27 PROCEDURE — 37000008 HC ANESTHESIA 1ST 15 MINUTES: Mod: PO | Performed by: INTERNAL MEDICINE

## 2025-01-27 PROCEDURE — 87449 NOS EACH ORGANISM AG IA: CPT | Performed by: INTERNAL MEDICINE

## 2025-01-27 PROCEDURE — 45380 COLONOSCOPY AND BIOPSY: CPT | Mod: ,,, | Performed by: INTERNAL MEDICINE

## 2025-01-27 PROCEDURE — 87209 SMEAR COMPLEX STAIN: CPT | Performed by: INTERNAL MEDICINE

## 2025-01-27 PROCEDURE — 87046 STOOL CULTR AEROBIC BACT EA: CPT | Performed by: INTERNAL MEDICINE

## 2025-01-27 PROCEDURE — 87045 FECES CULTURE AEROBIC BACT: CPT | Performed by: INTERNAL MEDICINE

## 2025-01-27 PROCEDURE — D9220A PRA ANESTHESIA: Mod: CRNA,,, | Performed by: NURSE ANESTHETIST, CERTIFIED REGISTERED

## 2025-01-27 PROCEDURE — 87427 SHIGA-LIKE TOXIN AG IA: CPT | Mod: 59 | Performed by: INTERNAL MEDICINE

## 2025-01-27 PROCEDURE — 63600175 PHARM REV CODE 636 W HCPCS: Mod: PO | Performed by: INTERNAL MEDICINE

## 2025-01-27 PROCEDURE — 45380 COLONOSCOPY AND BIOPSY: CPT | Mod: PO | Performed by: INTERNAL MEDICINE

## 2025-01-27 PROCEDURE — 37000009 HC ANESTHESIA EA ADD 15 MINS: Mod: PO | Performed by: INTERNAL MEDICINE

## 2025-01-27 RX ORDER — PROPOFOL 10 MG/ML
VIAL (ML) INTRAVENOUS
Status: DISCONTINUED | OUTPATIENT
Start: 2025-01-27 | End: 2025-01-27

## 2025-01-27 RX ORDER — LIDOCAINE HYDROCHLORIDE 20 MG/ML
INJECTION INTRAVENOUS
Status: DISCONTINUED | OUTPATIENT
Start: 2025-01-27 | End: 2025-01-27

## 2025-01-27 RX ORDER — SODIUM CHLORIDE, SODIUM LACTATE, POTASSIUM CHLORIDE, CALCIUM CHLORIDE 600; 310; 30; 20 MG/100ML; MG/100ML; MG/100ML; MG/100ML
INJECTION, SOLUTION INTRAVENOUS CONTINUOUS
Status: DISCONTINUED | OUTPATIENT
Start: 2025-01-27 | End: 2025-01-27 | Stop reason: HOSPADM

## 2025-01-27 RX ORDER — SODIUM CHLORIDE 0.9 % (FLUSH) 0.9 %
10 SYRINGE (ML) INJECTION
Status: DISCONTINUED | OUTPATIENT
Start: 2025-01-27 | End: 2025-01-27 | Stop reason: HOSPADM

## 2025-01-27 RX ADMIN — PROPOFOL 50 MG: 10 INJECTION, EMULSION INTRAVENOUS at 12:01

## 2025-01-27 RX ADMIN — PROPOFOL 50 MG: 10 INJECTION, EMULSION INTRAVENOUS at 01:01

## 2025-01-27 RX ADMIN — PROPOFOL 100 MG: 10 INJECTION, EMULSION INTRAVENOUS at 12:01

## 2025-01-27 RX ADMIN — LIDOCAINE HYDROCHLORIDE 100 MG: 20 INJECTION INTRAVENOUS at 12:01

## 2025-01-27 RX ADMIN — SODIUM CHLORIDE, POTASSIUM CHLORIDE, SODIUM LACTATE AND CALCIUM CHLORIDE: 600; 310; 30; 20 INJECTION, SOLUTION INTRAVENOUS at 12:01

## 2025-01-27 NOTE — H&P
History & Physical - Short Stay  Gastroenterology      SUBJECTIVE:     Procedure: Colonoscopy    Chief Complaint/Indication for Procedure: Change in bowel habits, Diarrhea.    History of Present Illness:  See recent GI OV note:  Office Visit   9/23/2024  [facility]       Erma Montalvo, BETO  Gastroenterology Chronic diarrhea of unknown origin +2 more  Dx Diarrhea  Reason for Visit     Progress Notes    Erma Montalvo, NP at 9/23/2024 11:30 AM    Status: Signed   Expand All Collapse All  Subjective:         Subjective  Patient ID: Delaney Noel is a 59 y.o. female, Body mass index is 32.26 kg/m².     Chief Complaint: Diarrhea        Established patient of Dr. Lozoya & myself.     Diarrhea   This is a chronic problem. The current episode started more than 1 year ago. The problem occurs 5 to 10 times per day (intermittent). The problem has been unchanged. Diarrhea characteristics: describes stool as type 4, 5 or 7 on bristol scale; denies bloody stools. The patient states that diarrhea does not awaken her from sleep. Associated symptoms include abdominal pain (lower abdomen; intermittent), increased flatus and weight loss (intentional). Pertinent negatives include no bloating, chills, coughing, fever or vomiting. Exacerbated by: eating. Risk factors: recent travel to [place]. She has tried bismuth subsalicylate (Currently: Bentyl 10 mg BID- helps with abdominal pain but not diarrhea; OTC Pepto bismol PRN- causes constipation) for the symptoms. There is no history of bowel resection, inflammatory bowel disease, irritable bowel syndrome or a recent abdominal surgery.      Review of Systems   Constitutional:  Positive for weight loss (intentional). Negative for appetite change, chills, fever and unexpected weight change.   HENT:  Negative for trouble swallowing.    Respiratory:  Negative for cough and shortness of breath.    Cardiovascular:  Negative for chest pain.   Gastrointestinal:  Positive for  abdominal pain (lower abdomen; intermittent), diarrhea and flatus. Negative for abdominal distention, anal bleeding, bloating, blood in stool, constipation, nausea, rectal pain and vomiting.       Assessment:      Assessment  1. Chronic diarrhea of unknown origin    2. Lower abdominal pain    3. History of colon polyps             Plan:   All diagnoses and orders for this visit:     Chronic diarrhea of unknown origin & Lower abdominal pain  - Stool Exam-Ova,Cysts,Parasites; Future; Expected date: 09/23/2024  - Giardia / Cryptosporidum, EIA; Future; Expected date: 09/23/2024  - Stool culture; Future; Expected date: 09/23/2024  - Clostridium difficile EIA; Future; Expected date: 09/23/2024  - Refill and continue: dicyclomine (BENTYL) 10 MG capsule; Take 1 capsule (10 mg total) by mouth 4 (four) times daily as needed (abdominal pain; diarrhea).  Dispense: 360 capsule; Refill: 3  - Start: cholestyramine (QUESTRAN) 4 gram packet; Take 1 packet (4 g total) by mouth once daily.  Dispense: 90 packet; Refill: 3  - Recommended increase fiber in diet, especially soluble fiber since this can help bulk up the stool consistency and may help to slow down how fast the stool goes through the colon and can prevent diarrhea   - Schedule Colonoscopy   - Consider CT of abdomen and pelvis if symptoms persist     History of colon polyps              - Schedule Colonoscopy      If no improvement in symptoms or symptoms worsen, call/follow-up at clinic or go to ER               See last Colonoscopy   Indications:        High risk colon cancer surveillance: Personal                        history of colonic polyps, Last colonoscopy: August 2014   Comorbidities       Hypertension, Obesity, Hypothyroidism   Providers:           Thiago Lozoya MD   Impression:          - Redundant colon.                        - The examination was otherwise normal.                        - The examined portion of the ileum was  normal.                        - No specimens collected.   Recommendation:      - Discharge patient to home.                        - High fiber diet.                        - Use fiber, for example Citrucel, Fibercon, Konsyl                        or Metamucil.                        - Take a PROBIOTIC, such as a carton of GREEK YOGURT                        (Chobani or Oikos, or Activia or Dannon); or tablets                        of ALIGN or CULTURELLE or TIFFANIE-Q (all                        non-prescription), every day for a month.                        - Repeat colonoscopy in 7 years for surveillance.                        - Continue present medications.                        - Return to normal activities tomorrow.   Thiago Lozoya MD   7/1/2020       Wt Readings from Last 15 Encounters:   01/27/25 75.3 kg (166 lb)   01/13/25 78.8 kg (173 lb 11.6 oz)   11/26/24 78.8 kg (173 lb 13.3 oz)   10/28/24 80.3 kg (177 lb)   09/27/24 82.6 kg (182 lb)   09/23/24 82.6 kg (182 lb 1.6 oz)   09/10/24 85.7 kg (189 lb)   08/22/24 86.1 kg (189 lb 13.1 oz)   06/18/24 88.4 kg (194 lb 14.2 oz)   05/28/24 90.7 kg (200 lb)   05/28/24 90.8 kg (200 lb 1.1 oz)   05/16/24 90 kg (198 lb 6.6 oz)   12/11/23 88.5 kg (195 lb)   11/28/23 88.7 kg (195 lb 10.5 oz)   07/21/23 88.5 kg (195 lb)         PTA Medications   Medication Sig    albuterol (PROVENTIL/VENTOLIN HFA) 90 mcg/actuation inhaler Inhale 2 puffs into the lungs every 6 (six) hours as needed for Wheezing. Rescue    aspirin (ECOTRIN) 81 MG EC tablet Take 1 tablet (81 mg total) by mouth once daily.    atenoloL (TENORMIN) 50 MG tablet Take 1 tablet (50 mg total) by mouth once daily.    cetirizine (ZYRTEC) 10 MG tablet Take 10 mg by mouth once daily. Take one(1) tab by mouth daily    clonazePAM (KLONOPIN) 1 MG tablet Take 1 tablet (1 mg total) by mouth daily as needed for Anxiety.    dicyclomine (BENTYL) 10 MG capsule Take 1 capsule (10 mg total) by mouth 4 (four) times daily as  needed (abdominal pain; diarrhea).    fluticasone propionate (FLONASE) 50 mcg/actuation nasal spray 2 sprays (100 mcg total) by Each Nostril route once daily.    gabapentin (NEURONTIN) 300 MG capsule Take 1 capsule (300 mg total) by mouth every evening.    galcanezumab-gnlm (EMGALITY SYRINGE) 120 mg/mL Syrg Inject 120 mg into the skin every 28 days.    ketorolac (TORADOL) 10 mg tablet Take 2 tabs (20mg) at the onset of a headache, if headache persists you may take 1 tab every 4 to 6 hours as needed. Do not exceed 40 mg per day.    lamoTRIgine (LAMICTAL) 25 MG tablet Take 3 tablets (75 mg total) by mouth every evening.    levothyroxine (SYNTHROID) 75 MCG tablet Take 1 tablet (75 mcg total) by mouth before breakfast.    methocarbamoL (ROBAXIN) 750 MG Tab Take 1 tablet (750 mg total) by mouth nightly as needed.    rosuvastatin (CRESTOR) 10 MG tablet Take 1 tablet (10 mg total) by mouth once daily.    traZODone (DESYREL) 100 MG tablet Take 1 tablet (100 mg total) by mouth nightly as needed for Insomnia.    venlafaxine (EFFEXOR-XR) 150 MG Cp24 Take 1 capsule (150 mg total) by mouth every morning.    cholestyramine (QUESTRAN) 4 gram packet Take 1 packet (4 g total) by mouth once daily. (Patient not taking: Reported on 1/13/2025)    ibuprofen (ADVIL,MOTRIN) 800 MG tablet Take 800 mg by mouth 3 (three) times daily as needed.       Review of patient's allergies indicates:   Allergen Reactions    Hay fever and allergy relief      Patient states environmental allergies; NKDA        Past Medical History:   Diagnosis Date    Allergy     Depression     Hx of colonic polyps 08/04/2014    per colonoscopy report    Hypertension     Migraine     Sleep apnea     Suicidal thoughts 12/2019    [facility] tx     Thyroid disease     Hypothyroid, MNG     Past Surgical History:   Procedure Laterality Date    COLONOSCOPY      COLONOSCOPY N/A 07/01/2020    Procedure: COLONOSCOPY;  Surgeon: Thiago Lozoya Jr., MD;  Location: [facility];   Service: Endoscopy;  Laterality: N/A;    cystoscope      EPIDURAL STEROID INJECTION INTO LUMBAR SPINE N/A 11/23/2022    Procedure: Injection-steroid-epidural-lumbar L5/S1;  Surgeon: Chandler Pereira MD;  Location: [facility] OR;  Service: Pain Management;  Laterality: N/A;    EPIDURAL STEROID INJECTION INTO LUMBAR SPINE N/A 07/25/2023    Procedure: Injection-steroid-epidural-lumbar L5/S1;  Surgeon: Chandler Pereira MD;  Location: [facility] OR;  Service: Pain Management;  Laterality: N/A;    EPIDURAL STEROID INJECTION INTO LUMBAR SPINE N/A 06/04/2024    Procedure: Injection-steroid-epidural-lumbar  L5/S1;  Surgeon: Chandler Pereira MD;  Location: [facility] OR;  Service: Pain Management;  Laterality: N/A;    HYSTERECTOMY      JAMIE with BSO- age 42    INJECTION OF ANESTHETIC AGENT AROUND MEDIAL BRANCH NERVES INNERVATING LUMBAR FACET JOINT Bilateral 09/13/2024    Procedure: Block-nerve-medial branch-lumbar    L4/5, L5/S1;  Surgeon: Chandler Pereira MD;  Location: [facility] OR;  Service: Pain Management;  Laterality: Bilateral;    INJECTION OF ANESTHETIC AGENT AROUND MEDIAL BRANCH NERVES INNERVATING LUMBAR FACET JOINT Bilateral 10/2/2024    Procedure: Block-nerve-medial branch-lumbar   L4/5, L5/S1;  Surgeon: Chandler Pereira MD;  Location: [facility] OR;  Service: Pain Management;  Laterality: Bilateral;    OOPHORECTOMY      RADIOFREQUENCY ABLATION Bilateral 10/28/2024    Procedure: Radiofrequency Ablation- L4/5, L5/S1;  Surgeon: Chandler Pereira MD;  Location: [facility] OR;  Service: Pain Management;  Laterality: Bilateral;     Family History   Problem Relation Name Age of Onset    Heart disease Mother          had childhood rheumatic fever    Cancer Mother          uterine cancer    Cervical cancer Mother  28    Atrial fibrillation Father      Macular degeneration Maternal Grandmother      Cancer Maternal Grandmother          uterine cancer    Stroke Maternal Grandmother      Heart disease Maternal Grandmother  50        MI    Stroke Maternal  "Grandfather      Nephrolithiasis Neg Hx      Glaucoma Neg Hx       Social History     Tobacco Use    Smoking status: Former     Current packs/day: 0.00     Average packs/day: 0.3 packs/day for 25.0 years (6.3 ttl pk-yrs)     Types: Cigarettes     Start date: 1990     Quit date: 2015     Years since quittin.6    Smokeless tobacco: Never    Tobacco comments:     had quit x 2   Substance Use Topics    Alcohol use: Yes     Alcohol/week: 2.0 standard drinks of alcohol     Types: 2 Cans of beer per week     Comment: hardly ever    Drug use: No         OBJECTIVE:     Vital Signs (Most Recent)  Temp: 97.2 °F (36.2 °C) (25 1150)  Pulse: 83 (25 1150)  Resp: 17 (25 1150)  BP: (!) 146/66 (25 1150)  SpO2: (!) 93 % (25 1150)    Physical Exam:  : Ht: 5' 3" (160 cm)   Wt: 75.3 kg   BMI: 29.41 kg/m² .                                                       GENERAL:  Comfortable, in no acute distress.                                 HEENT EXAM:  Nonicteric.  No adenopathy.  Oropharynx is clear.               NECK:  Supple.                                                               LUNGS:  Clear.                                                               CARDIAC:  Regular rate and rhythm.  S1, S2.  No murmur.                      ABDOMEN:  Soft, positive bowel sounds, nontender.  No hepatosplenomegaly or masses.  No rebound or guarding.                                             EXTREMITIES:  No edema.     MENTAL STATUS:  Alert and oriented.    ASSESSMENT/PLAN:     Assessment: Change in bowel habits, Diarrhea.    Plan: Colonoscopy    Anesthesia Plan:   MAC / General Anaesthesia    ASA Grade: ASA 2 - Patient with mild systemic disease with no functional limitations    MALLAMPATI SCORE: II (hard and soft palate, upper portion of tonsils anduvula visible)    "

## 2025-01-27 NOTE — BRIEF OP NOTE
Discharge Note  Short Stay      SUMMARY     Admit Date: 1/27/2025    Attending Physician: Thiago Lzooya Jr., MD     Discharge Physician: Thiago Lozoya Jr., MD    Discharge Date: 1/27/2025 1:36 PM    Final Diagnosis: Diarrhea, unspecified type [R19.7]    Impression:            - The anus is normal.                          - Non-bleeding internal hemorrhoids.                          - The rectum and recto-sigmoid colon are normal.                          Fluid aspiration performed.                          - Tortuous colon.                          - Redundant colon.                          - The examination was otherwise normal.                          - The entire examined colon is normal. Biopsied.   Recommendation:        - Discharge patient to home.                          - Await pathology results.                          - High fiber diet, low fat diet and lactose free                          diet.                          - Continue present medications.                          - Use Questran at 1 packet (4 grams) PO BID.                          - Next, consider retrial of pepto bismol                          - Repeat colonoscopy in 10 years for screening                          purposes.                          - Patient has a contact number available for                          emergencies. The signs and symptoms of potential                          delayed complications were discussed with the                          patient. Return to normal activities tomorrow.                          Written discharge instructions were provided to                          the patient.                          - Return to normal activities tomorrow.   Thiago Lozoya MD   1/27/2025       Disposition: HOME OR SELF CARE    Patient Instructions:   Current Discharge Medication List        CONTINUE these medications which have NOT CHANGED    Details   albuterol (PROVENTIL/VENTOLIN HFA) 90  mcg/actuation inhaler Inhale 2 puffs into the lungs every 6 (six) hours as needed for Wheezing. Rescue  Qty: 18 g, Refills: 2    Associated Diagnoses: Shortness of breath      aspirin (ECOTRIN) 81 MG EC tablet Take 1 tablet (81 mg total) by mouth once daily.  Qty: 90 tablet, Refills: 3    Associated Diagnoses: Hyperlipidemia, unspecified hyperlipidemia type      atenoloL (TENORMIN) 50 MG tablet Take 1 tablet (50 mg total) by mouth once daily.  Qty: 90 tablet, Refills: 3    Comments: .      cetirizine (ZYRTEC) 10 MG tablet Take 10 mg by mouth once daily. Take one(1) tab by mouth daily      clonazePAM (KLONOPIN) 1 MG tablet Take 1 tablet (1 mg total) by mouth daily as needed for Anxiety.  Qty: 30 tablet, Refills: 0    Comments: Please DIAMANTE Swartz refills, taking patient over for this medication  Associated Diagnoses: HUNTER (generalized anxiety disorder)      dicyclomine (BENTYL) 10 MG capsule Take 1 capsule (10 mg total) by mouth 4 (four) times daily as needed (abdominal pain; diarrhea).  Qty: 360 capsule, Refills: 3    Associated Diagnoses: Chronic diarrhea of unknown origin; Lower abdominal pain      fluticasone propionate (FLONASE) 50 mcg/actuation nasal spray 2 sprays (100 mcg total) by Each Nostril route once daily.  Qty: 48 g, Refills: 3    Associated Diagnoses: Chronic allergic rhinitis      gabapentin (NEURONTIN) 300 MG capsule Take 1 capsule (300 mg total) by mouth every evening.  Qty: 90 capsule, Refills: 3      galcanezumab-gnlm (EMGALITY SYRINGE) 120 mg/mL Syrg Inject 120 mg into the skin every 28 days.  Qty: 1 mL, Refills: 5    Associated Diagnoses: Migraine with aura and without status migrainosus, not intractable      ketorolac (TORADOL) 10 mg tablet Take 2 tabs (20mg) at the onset of a headache, if headache persists you may take 1 tab every 4 to 6 hours as needed. Do not exceed 40 mg per day.  Qty: 20 tablet, Refills: 5    Associated Diagnoses: Migraine with aura and without status migrainosus, not  intractable      lamoTRIgine (LAMICTAL) 25 MG tablet Take 3 tablets (75 mg total) by mouth every evening.  Qty: 270 tablet, Refills: 0    Associated Diagnoses: Bipolar 2 disorder      levothyroxine (SYNTHROID) 75 MCG tablet Take 1 tablet (75 mcg total) by mouth before breakfast.  Qty: 90 tablet, Refills: 3    Associated Diagnoses: Hypothyroidism, unspecified type      methocarbamoL (ROBAXIN) 750 MG Tab Take 1 tablet (750 mg total) by mouth nightly as needed.  Qty: 90 tablet, Refills: 2      rosuvastatin (CRESTOR) 10 MG tablet Take 1 tablet (10 mg total) by mouth once daily.  Qty: 90 tablet, Refills: 1      traZODone (DESYREL) 100 MG tablet Take 1 tablet (100 mg total) by mouth nightly as needed for Insomnia.  Qty: 90 tablet, Refills: 0    Associated Diagnoses: Insomnia due to psychological stress      venlafaxine (EFFEXOR-XR) 150 MG Cp24 Take 1 capsule (150 mg total) by mouth every morning.  Qty: 90 capsule, Refills: 0    Associated Diagnoses: Chronic post-traumatic stress disorder (PTSD); HUNTER (generalized anxiety disorder); Major depressive disorder, recurrent episode, moderate      cholestyramine (QUESTRAN) 4 gram packet Take 1 packet (4 g total) by mouth once daily.  Qty: 90 packet, Refills: 3    Associated Diagnoses: Chronic diarrhea of unknown origin      ibuprofen (ADVIL,MOTRIN) 800 MG tablet Take 800 mg by mouth 3 (three) times daily as needed.             Discharge Procedure Orders (must include Diet, Follow-up, Activity)    Follow Up:  Follow up with PCP as per your routine.  Please follow a high fiber, low fat, dairy free diet.  Activity as tolerated.    No driving day of procedure.

## 2025-01-27 NOTE — [employer] PATIENT INSTRUCTIONS
Discharge Summary/Instructions for after Colonoscopy with   Biopsy/Polypectomy  Delaney Noel    Monday, January 27, 2025  Thiago Lozoya MD  RESTRICTIONS ON ACTIVITY:  - Do not drive a car or operate machinery until the day after the procedure.      - The following day: return to full activity including work.  - For  3 days: No heavy lifting, straining or running.  - Diet: You can have solid foods, but no gassy foods (i.e. beans, broccoli,   cabbage, etc).  TREATMENT FOR COMMON SIDE EFFECTS:  - Mild abdominal pain and bloating or excessive gas: rest, eat lightly and   use a heating pad.  SYMPTOMS TO WATCH FOR AND REPORT TO YOUR PHYSICIAN:  1. Severe abdominal pain.  2. Fever within 24 hours after a procedure.  3. A large amount of rectal bleeding. (A small amount of blood from the   rectum is not serious, especially if hemorrhoids are present.  3.  Because air was put into your colon during the procedure, expelling   large amounts of air from your rectum is normal.  4.  You may not have a bowel movement for 1-3 days because of the   colonoscopy prep.  This is normal.  5.  Call immediately if you notice any of the following:   Chills and/or fever over 101   Persistent vomiting   Severe abdominal pain, other than gas cramps   Severe chest pain   Black, tarry stools   Any bleeding - exceeding one tablespoon  Your doctor recommends these additional instructions:  We are waiting for your pathology and culture results.   Eat a high fiber diet, eat a low fat diet and eat a lactose free diet.   Continue your present medications.   Take Questran 1 packet (4 grams) in 2-6 ounces of water or non-carbonated   beverage by mouth twice a day (1-2 hours after meal).   Your physician has recommended a repeat colonoscopy in 10 years for   screening purposes.  None  If you have any questions or problems, please call your physician.  EMERGENCY PHONE NUMBER: [phone]  LAB RESULTS: Call in two (2) weeks for lab results,  [phone]  ___________________________________________  Nurse Signature  ___________________________________________  Patient/Designated Responsible Party Signature  Thiago Lozoya MD  1/27/2025 1:34:13 PM  This report has been verified and signed electronically.  Dear patient,  As a result of recent federal legislation (The Federal Cures Act), you may   receive lab or pathology results from your procedure in your [facility]   account before your physician is able to contact you. Your physician or   their representative will relay the results to you with their   recommendations at their soonest availability.  Thank you.  [employer]

## 2025-01-27 NOTE — ANESTHESIA POSTPROCEDURE EVALUATION
Anesthesia Post Evaluation    Patient: Delaney Noel    Procedure(s) Performed: Procedure(s) (LRB):  COLONOSCOPY (N/A)    Final Anesthesia Type: general      Patient location during evaluation: PACU  Patient participation: Yes- Able to Participate  Level of consciousness: awake and alert  Post-procedure vital signs: reviewed and stable  Pain management: adequate  Airway patency: patent    PONV status at discharge: No PONV  Anesthetic complications: no      Cardiovascular status: blood pressure returned to baseline  Respiratory status: unassisted  Hydration status: euvolemic  Follow-up not needed.              Vitals Value Taken Time   /61 01/27/25 1334   Temp 36.4 °C (97.5 °F) 01/27/25 1317   Pulse 77 01/27/25 1334   Resp 14 01/27/25 1334   SpO2 94 % 01/27/25 1334         Event Time   Out of Recovery 13:55:00         Pain/Bouchra Score: Bouchra Score: 9 (1/27/2025  1:34 PM)

## 2025-01-27 NOTE — DISCHARGE INSTRUCTIONS
Recovery After Procedural Sedation (Adult)   You have been given medicine by vein to make you sleep during your surgery. This may have included both a pain medicine and sleeping medicine. Most of the effects have worn off. But you may still have some drowsiness for the next 6 to 8 hours.  Home care  Follow these guidelines when you get home:  For the next 8 hours, you should be watched by a responsible adult. This person should make sure your condition is not getting worse.  Don't drink any alcohol for the next 24 hours.  Don't drive, operate dangerous machinery, or make important business or personal decisions during the next 24 hours.  To prevent injury or falls, use caution when standing and walking for at least 24 hours after your procedure.  Note: Your healthcare provider may tell you not to take any medicine by mouth for pain or sleep in the next 4 hours. These medicines may react with the medicines you were given in the hospital. This could cause a much stronger response than usual.  Follow-up care  Follow up with your healthcare provider if you are not alert and back to your usual level of activity within 12 hours.  When to seek medical advice  Call your healthcare provider right away if any of these occur:  Drowsiness gets worse  Weakness or dizziness gets worse  Repeated vomiting  You can't be awakened  Fever  New rash  Twitch last reviewed this educational content on 9/1/2019  © 9375-1227 [employer]. [address] [place] [address], [place] [id number]. All rights reserved. This information is not intended as a substitute for professional medical care. Always follow your healthcare professional's instructions         High-Fiber Diet  Fiber is in fruits, vegetables, cereals, and grains. Fiber passes through your body undigested. A high-fiber diet helps food move through your intestinal tract. The added bulk is helpful in preventing constipation. In people with diverticulosis, fiber helps clean out  the pouches along the colon wall. It also prevents new pouches from forming. A high-fiber diet reduces the risk of colon cancer. It also lowers blood cholesterol and prevents high blood sugar in people with diabetes.    The fiber-rich foods listed below should be part of your diet. If you are not used to high-fiber foods, start with 1 or 2 foods from this list. Every 3 to 4 days add a new one to your diet. Do this until you are eating 4 high-fiber foods per day. This should give you 20 to 35 grams of fiber a day. It is also important to drink a lot of water when you are on this diet. You should have 6 to 8 glasses of water a day. Water makes the fiber swell and increases the benefit.  Foods high in dietary fiber  The following foods are high in dietary fiber:  Breads. Breads made with 100% whole-wheat flour; roberta, wheat, or rye crackers; whole-grain tortillas, bran muffins.  Cereals. Whole-grain and bran cereals with bran (shredded wheat, wheat flakes, raisin bran, corn bran); oatmeal, rolled oats, granola, and brown rice.  Fruits. Fresh fruits and their edible skins (pears, prunes, raisins, berries, apples, and apricots); bananas, citrus fruit, mangoes, pineapple; and prune juice.  Nuts. Any nuts and seeds.  Vegetables. Best served raw or lightly cooked. All types, especially: green peas, celery, eggplant, potatoes, spinach, broccoli, [place] sprouts, winter squash, carrots, cauliflower, soybeans, lentils, and fresh and dried beans of all kinds.  Other. Popcorn, any spices.  Date Last Reviewed: 8/1/2016  © 4432-4781 [employer]. [address] [place] [address], [place] [address]. All rights reserved. This information is not intended as a substitute for professional medical care. Always follow your healthcare professional's instructions.

## 2025-01-27 NOTE — ANESTHESIA PREPROCEDURE EVALUATION
01/27/2025  Delaney Noel is a 59 y.o., female.      Pre-op Assessment    I have reviewed the Patient Summary Reports.     I have reviewed the Nursing Notes. I have reviewed the NPO Status.   I have reviewed the Medications.     Review of Systems  Anesthesia Hx:             Denies Family Hx of Anesthesia complications.    Denies Personal Hx of Anesthesia complications.                    Cardiovascular:     Hypertension                                          Pulmonary:        Sleep Apnea           Education provided regarding risk of obstructive sleep apnea            Hepatic/GI:  Bowel Prep.                   Neurological:      Headaches                                 Endocrine:   Hypothyroidism        Obesity / BMI > 30  Psych:  Psychiatric History anxiety depression                Physical Exam  General: Cooperative, Alert and Oriented    Airway:  Mallampati: II   Mouth Opening: Normal  TM Distance: Normal  Tongue: Normal        Anesthesia Plan  Type of Anesthesia, risks & benefits discussed:    Anesthesia Type: Gen Natural Airway  Intra-op Monitoring Plan: Standard ASA Monitors  Induction:  IV  Informed Consent: Informed consent signed with the Patient and all parties understand the risks and agree with anesthesia plan.  All questions answered.   ASA Score: 2    Ready For Surgery From Anesthesia Perspective.     .

## 2025-01-27 NOTE — TRANSFER OF CARE
"Anesthesia Transfer of Care Note    Patient: Delaney Noel    Procedure(s) Performed: Procedure(s) (LRB):  COLONOSCOPY (N/A)    Patient location: PACU    Anesthesia Type: general    Transport from OR: Transported from OR on room air with adequate spontaneous ventilation    Post pain: adequate analgesia    Post assessment: no apparent anesthetic complications and tolerated procedure well    Post vital signs: stable    Level of consciousness: sedated    Nausea/Vomiting: no nausea/vomiting    Complications: none    Transfer of care protocol was followed      Last vitals: Visit Vitals  BP (!) 146/66 (BP Location: Right arm, Patient Position: Lying)   Pulse 83   Temp 36.2 °C (97.2 °F) (Skin)   Resp 17   Ht 5' 3" (1.6 m)   Wt 75.3 kg (166 lb)   SpO2 (!) 93%   Breastfeeding No   BMI 29.41 kg/m²     "

## 2025-01-28 LAB
E COLI SXT1 STL QL IA: NEGATIVE
E COLI SXT2 STL QL IA: NEGATIVE
FINAL PATHOLOGIC DIAGNOSIS: NORMAL
GROSS: NORMAL
Lab: NORMAL

## 2025-01-29 LAB — BACTERIA STL CULT: NORMAL

## 2025-02-03 LAB — O+P STL MICRO: NORMAL

## 2025-02-05 DIAGNOSIS — F43.12 CHRONIC POST-TRAUMATIC STRESS DISORDER (PTSD): ICD-10-CM

## 2025-02-05 DIAGNOSIS — F33.1 MAJOR DEPRESSIVE DISORDER, RECURRENT EPISODE, MODERATE: ICD-10-CM

## 2025-02-05 DIAGNOSIS — F41.1 GAD (GENERALIZED ANXIETY DISORDER): ICD-10-CM

## 2025-02-05 RX ORDER — VENLAFAXINE HYDROCHLORIDE 150 MG/1
150 CAPSULE, EXTENDED RELEASE ORAL EVERY MORNING
Qty: 90 CAPSULE | Refills: 0 | Status: SHIPPED | OUTPATIENT
Start: 2025-02-05 | End: 2025-05-06

## 2025-02-17 ENCOUNTER — TELEPHONE (OUTPATIENT)
Dept: GASTROENTEROLOGY | Facility: CLINIC | Age: 60
End: 2025-02-17
Payer: COMMERCIAL

## 2025-02-17 NOTE — TELEPHONE ENCOUNTER
Called and spoke with the patient, patient was set up for an appointment with Erma Montalvo NP per recommendations of Dr. Lozoya, patient verbalized understanding of this.

## 2025-02-20 ENCOUNTER — PATIENT MESSAGE (OUTPATIENT)
Dept: PSYCHIATRY | Facility: CLINIC | Age: 60
End: 2025-02-20
Payer: COMMERCIAL

## 2025-02-20 DIAGNOSIS — F31.81 BIPOLAR 2 DISORDER: ICD-10-CM

## 2025-02-21 DIAGNOSIS — G43.109 MIGRAINE WITH AURA AND WITHOUT STATUS MIGRAINOSUS, NOT INTRACTABLE: ICD-10-CM

## 2025-02-21 RX ORDER — GALCANEZUMAB 120 MG/ML
120 INJECTION, SOLUTION SUBCUTANEOUS
Qty: 1 ML | Refills: 5 | Status: SHIPPED | OUTPATIENT
Start: 2025-02-21 | End: 2025-08-20

## 2025-02-21 RX ORDER — LAMOTRIGINE 25 MG/1
75 TABLET ORAL NIGHTLY
Qty: 270 TABLET | Refills: 0 | Status: SHIPPED | OUTPATIENT
Start: 2025-02-21 | End: 2025-05-22

## 2025-02-25 ENCOUNTER — RESULTS FOLLOW-UP (OUTPATIENT)
Dept: GASTROENTEROLOGY | Facility: HOSPITAL | Age: 60
End: 2025-02-25

## 2025-03-29 DIAGNOSIS — F51.02 INSOMNIA DUE TO PSYCHOLOGICAL STRESS: ICD-10-CM

## 2025-03-31 RX ORDER — TRAZODONE HYDROCHLORIDE 100 MG/1
100 TABLET ORAL NIGHTLY PRN
Qty: 90 TABLET | Refills: 0 | Status: SHIPPED | OUTPATIENT
Start: 2025-03-31 | End: 2025-06-29

## 2025-04-02 ENCOUNTER — OFFICE VISIT (OUTPATIENT)
Dept: PSYCHIATRY | Facility: CLINIC | Age: 60
End: 2025-04-02
Payer: COMMERCIAL

## 2025-04-02 DIAGNOSIS — F31.81 BIPOLAR 2 DISORDER: Primary | ICD-10-CM

## 2025-04-02 DIAGNOSIS — F43.12 CHRONIC POST-TRAUMATIC STRESS DISORDER (PTSD): ICD-10-CM

## 2025-04-02 DIAGNOSIS — F51.02 INSOMNIA DUE TO PSYCHOLOGICAL STRESS: ICD-10-CM

## 2025-04-02 DIAGNOSIS — F33.1 MAJOR DEPRESSIVE DISORDER, RECURRENT EPISODE, MODERATE: ICD-10-CM

## 2025-04-02 DIAGNOSIS — F41.1 GAD (GENERALIZED ANXIETY DISORDER): ICD-10-CM

## 2025-04-02 RX ORDER — VENLAFAXINE HYDROCHLORIDE 75 MG/1
75 CAPSULE, EXTENDED RELEASE ORAL DAILY
Qty: 90 CAPSULE | Refills: 0 | Status: SHIPPED | OUTPATIENT
Start: 2025-04-02 | End: 2025-07-01

## 2025-04-02 NOTE — PROGRESS NOTES
Outpatient Psychiatry Follow-Up Visit    Clinical Status of Patient: Outpatient (Ambulatory)  04/02/2025    The patient location is:  [place], [place]  The patient phone number is: [phone]   Visit type: Virtual visit with synchronous audio and video  Each patient to whom he or she provides medical services by telemedicine is:  (1) informed of the relationship between the physician and patient and the respective role of any other health care provider with respect to management of the patient; and (2) notified that he or she may decline to receive medical services by telemedicine and may withdraw from such care at any time.     Chief Complaint: Pt is a 60 y.o. female who presents today for a follow-up. Met with patient.       Interval History and Content of Current Session:  Interim Events/Subjective Report/Content of Current Session:  follow up appointment.    Pt is a 60 y.o. 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     Past Psychiatric hx:   Pt. is a 59 y.o. female with a past psychiatric hx of Major depressive disorder, recurrent episode, moderate, Bipolar 2 disorder, HUNTER (generalized anxiety disorder) presenting to the clinic for an initial evaluation and treatment. Past medical history outlined below; she is currently taking clonazePAM (KLONOPIN), lamoTRIgine (LAMICTAL), QUEtiapine (SEROQUEL), EScitalopram oxalate (LEXAPRO), prescribed and managed by Dr. Swartz; she reports that these medications have not worked as well recently.     12/31/24: Pt presents to OP Psychiatry for routine follow-up for treatment/medication management of Bipolar, PTSD, MDD, HUNTER, Insomnia. Pt reports all medications effectively treating her symptoms, discussed taking over her Klonopin, pt to contact this  provider when running low. Pt has plans to go to [place] for the month of March, instructed pt to take her Klonopin sparingly as she will be out of the country when her March refill expires. Pt in agreement. Denies need for any medication changes at this time. Pt denies SI/HI/AVH, self-harm, plan, or intent. Pt verbalizes understanding of medication instructions through teach back. No other issues, concerns, or problems, will reassess symptoms and medications in 3 months or PRN if symptoms worsen.     Past Medical hx:   Past Medical History:   Diagnosis Date    Allergy     Depression     Hx of colonic polyps 08/04/2014    per colonoscopy report    Hypertension     Migraine     Sleep apnea     Suicidal thoughts 12/2019    [facility] tx     Thyroid disease     Hypothyroid, [facility]        Interim hx:  Medication changes last visit:     1. Continue clonazePAM (KLONOPIN) 1 MG tablet - Take 1 tablet (1 mg total) by mouth daily as needed for Anxiety. Discussed risk of decreased RT, sedation, addictive potential, and not to mix with alcohol.   2. Continue lamoTRIgine (LAMICTAL) 25 MG tablet - Take 3 tablets (75 mg total) by mouth once daily at bedtime for bipolar disorder.  3. Continue traZODone (DESYREL) 100 MG tablet - Take 1 tablet (100 mg total) by mouth nightly as needed for Insomnia.  4. Continue venlafaxine (EFFEXOR-XR) 150 MG 24 hr capsule - Take 1 capsule (150 mg total) by mouth once daily for depression, anxiety, PTSD. Discussed potential for GI side effects, sexual dysfunction, mood destabilization, headaches.    Anxiety: Increased  Depression: Increased  Sleep: Manageable  Appetite: Good, no issues     Denies suicidal/homicidal ideations.  Denies hopelessness/worthlessness.    Denies auditory/visual hallucinations.      Tobacco:  quit 5 years ago  Alcohol:  Rarely  Drug use:  denies  Caffeine:  1 cup daily, rarely a coke       Review of Systems   PSYCHIATRIC: Pertinent items are noted in the narrative.         CONSTITUTIONAL: weight stable        M/S: no pain today         ENT: no allergies noted today        ABD: no n/v/d     Past Medical, Family and Social History: The patient's past medical, family and social history have been reviewed and updated as appropriate within the electronic medical record. See encounter notes.     Medication Compliance: yes      Side effects: tolerates     Risk Parameters:  Patient reports no suicidal ideation  Patient reports no homicidal ideation  Patient reports no self-injurious behavior  Patient reports no violent behavior     Exam (detailed: at least 9 elements; comprehensive: all 15 elements)   Constitutional  Vitals:  Most recent vital signs, dated less than 90 days prior to this appointment, were reviewed.     General:  unremarkable, age appropriate, casual attire, good eye contact, good rapport       Musculoskeletal  Muscle Strength/Tone:  no flaccidity, no tremor    Gait & Station:  normal      Psychiatric                       Speech:  normal tone, normal rate, rhythm, and volume   Mood & Affect:   Euthymic, congruent, appropriate         Thought Process:   Goal directed; Linear    Associations:   intact   Thought Content:   No SI/HI, delusions, or paranoia, no AV/VH   Insight & Judgement:   Good, adequate to circumstances   Orientation:   grossly intact; alert and oriented x 4    Memory:  intact for content of interview    Language:  grossly intact, can repeat    Attention Span  : Grossly intact for content of interview   Fund of Knowledge:   intact and appropriate to age and level of education        Assessment and Diagnosis   Status/Progress: Based on the examination today, the patient's problem(s) is/are under fair control.  New problems have not been presented today. Comorbidities are not currently complicating management of the primary condition.      Impression:  Pt presents to OP Psychiatry for routine follow-up for treatment/medication management of Bipolar 2, PTSD, MDD,  HUNTER, Insomnia. Reports having the best time in [place] and she started feeling depressed when she got home, states she wants to move to [place], has been at odds with her daughter and her family and feels guilty that they get mad at her for having different opinions. Discussed increasing Effexor to help get through this period, pt in agreement to try. Pt denies SI/HI/AVH, self-harm, plan, or intent. Pt verbalizes understanding of medication instructions through teach back. No other issues, concerns, or problems, will reassess symptoms and medications in 30 days or PRN if symptoms worsen.      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 Continue clonazePAM (KLONOPIN) 1 MG tablet - Take 1 tablet (1 mg total) by mouth daily as needed for Anxiety. Discussed risk of decreased RT, sedation, addictive potential, and not to mix with alcohol.      2. Continue lamoTRIgine (LAMICTAL) 25 MG tablet - Take 3 tablets (75 mg total) by mouth once daily at bedtime for bipolar disorder.     3. Continue traZODone (DESYREL) 100 MG tablet - Take 1 tablet (100 mg total) by mouth nightly as needed for Insomnia.     4. Increase venlafaxine (EFFEXOR-XR) 150 MG + 75 MG 24 hr capsule - Take 1 capsule each (225 mg total) by mouth once daily for depression, anxiety, PTSD. Discussed potential for GI side effects, sexual dysfunction, mood destabilization, headaches.     5. Call to report any worsening of symptoms or problems with the medication. Pt instructed to go to ER with thoughts of harming self, others.     6. Patient given contact # for psychotherapists at [facility] and also instructed she may check with insurance for list of providers.         Labs: none         Return to clinic:       30 days or PRN if symptoms worsen    -Spent 30min face to face with the pt; >50% time spent in counseling   -Supportive therapy and psychoeducation provided  -R/B/SE's of medications discussed with the pt who expresses understanding and chooses to take medications as prescribed.   -Pt instructed to call clinic, 911 or go to nearest emergency room if sxs worsen or pt is in   crisis. The pt expresses understanding.      Toya Hauser NP  Psychiatric-Mental Health Nurse Practitioner  Department of Psychiatry    [facility] [place] [address] [place] [address]  Phone:  [phone]  Fax: [phone]

## 2025-04-07 ENCOUNTER — HOSPITAL ENCOUNTER (OUTPATIENT)
Dept: RADIOLOGY | Facility: HOSPITAL | Age: 60
Discharge: HOME | End: 2025-04-07
Attending: INTERNAL MEDICINE
Payer: COMMERCIAL

## 2025-04-07 ENCOUNTER — RESULTS FOLLOW-UP (OUTPATIENT)
Dept: FAMILY MEDICINE | Facility: CLINIC | Age: 60
End: 2025-04-07

## 2025-04-07 ENCOUNTER — OFFICE VISIT (OUTPATIENT)
Dept: GASTROENTEROLOGY | Facility: CLINIC | Age: 60
End: 2025-04-07
Payer: COMMERCIAL

## 2025-04-07 VITALS — BODY MASS INDEX: 29.88 KG/M2 | WEIGHT: 168.63 LBS | HEIGHT: 63 IN

## 2025-04-07 DIAGNOSIS — K58.0 IRRITABLE BOWEL SYNDROME WITH DIARRHEA: ICD-10-CM

## 2025-04-07 DIAGNOSIS — R19.5 ABNORMAL FINDINGS IN STOOL: ICD-10-CM

## 2025-04-07 DIAGNOSIS — Z12.11 SCREENING FOR COLON CANCER: ICD-10-CM

## 2025-04-07 DIAGNOSIS — Z98.890 HISTORY OF COLONOSCOPY: Primary | ICD-10-CM

## 2025-04-07 DIAGNOSIS — Z12.31 ENCOUNTER FOR SCREENING MAMMOGRAM FOR MALIGNANT NEOPLASM OF BREAST: ICD-10-CM

## 2025-04-07 PROCEDURE — 99214 OFFICE O/P EST MOD 30 MIN: CPT | Mod: S$GLB,,, | Performed by: NURSE PRACTITIONER

## 2025-04-07 PROCEDURE — 1160F RVW MEDS BY RX/DR IN RCRD: CPT | Mod: CPTII,S$GLB,, | Performed by: NURSE PRACTITIONER

## 2025-04-07 PROCEDURE — 1159F MED LIST DOCD IN RCRD: CPT | Mod: CPTII,S$GLB,, | Performed by: NURSE PRACTITIONER

## 2025-04-07 PROCEDURE — 3008F BODY MASS INDEX DOCD: CPT | Mod: CPTII,S$GLB,, | Performed by: NURSE PRACTITIONER

## 2025-04-07 PROCEDURE — 99999 PR PBB SHADOW E&M-EST. PATIENT-LVL IV: CPT | Mod: PBBFAC,,, | Performed by: NURSE PRACTITIONER

## 2025-04-07 PROCEDURE — 77067 SCR MAMMO BI INCL CAD: CPT | Mod: TC,PO

## 2025-04-07 PROCEDURE — 77063 BREAST TOMOSYNTHESIS BI: CPT | Mod: 26,,, | Performed by: RADIOLOGY

## 2025-04-07 PROCEDURE — 77067 SCR MAMMO BI INCL CAD: CPT | Mod: 26,,, | Performed by: RADIOLOGY

## 2025-04-07 NOTE — PROGRESS NOTES
Subjective:       Patient ID: Delaney Noel is a 60 y.o. female, Body mass index is 29.88 kg/m².    Chief Complaint: GI Problem (GI f/u)      Established patient of Dr. Lozoya & myself.    Diarrhea   This is a chronic problem. The current episode started more than 1 year ago. Episode frequency: 1 time per day. The problem has been resolved (denies diarrhea currently). Diarrhea characteristics: describes stool as formed. The patient states that diarrhea does not awaken her from sleep. Associated symptoms include weight loss (intentional; eating smaller portions). Pertinent negatives include no abdominal pain (resolved since last office visit; no longer needing to take Bentyl), bloating, chills, coughing, fever, increased  flatus or vomiting. Nothing aggravates the symptoms. There are no known risk factors (stool evaluation showed leukocytes and RBC in stool). Treatments tried: Currently: Questran 4 gm once daily- helps. Her past medical history is significant for irritable bowel syndrome. There is no history of bowel resection, inflammatory bowel disease or a recent abdominal surgery.     Review of Systems   Constitutional:  Positive for weight loss (intentional; eating smaller portions). Negative for appetite change, chills, fever and unexpected weight change.   HENT:  Negative for trouble swallowing.    Respiratory:  Negative for cough and shortness of breath.    Cardiovascular:  Negative for chest pain.   Gastrointestinal:  Positive for diarrhea. Negative for abdominal distention, abdominal pain (resolved since last office visit; no longer needing to take Bentyl), anal bleeding, bloating, blood in stool, constipation, flatus, nausea, rectal pain and vomiting.   Genitourinary:  Negative for difficulty urinating and dysuria.   Musculoskeletal:  Negative for gait problem.   Skin:  Negative for rash.   Neurological:  Negative for speech difficulty.   Psychiatric/Behavioral:  Negative for confusion.        Past  Medical History:   Diagnosis Date    Allergy     Chronic diarrhea     Depression     Hx of colonic polyps 08/04/2014    per colonoscopy report    Hypertension     Migraine     Sleep apnea     Suicidal thoughts 12/2019    [facility] tx     Thyroid disease     Hypothyroid, MNG      Past Surgical History:   Procedure Laterality Date    COLONOSCOPY      COLONOSCOPY N/A 07/01/2020    Procedure: COLONOSCOPY;  Surgeon: Thiago Lozoya Jr., MD;  Location: [facility] ENDO;  Service: Endoscopy;  Laterality: N/A;    COLONOSCOPY N/A 1/27/2025    Procedure: COLONOSCOPY;  Surgeon: Thiago Lozoya Jr., MD;  Location: [facility] ENDO;  Service: Endoscopy;  Laterality: N/A;    cystoscope      EPIDURAL STEROID INJECTION INTO LUMBAR SPINE N/A 11/23/2022    Procedure: Injection-steroid-epidural-lumbar L5/S1;  Surgeon: Chandler Pereira MD;  Location: [facility] OR;  Service: Pain Management;  Laterality: N/A;    EPIDURAL STEROID INJECTION INTO LUMBAR SPINE N/A 07/25/2023    Procedure: Injection-steroid-epidural-lumbar L5/S1;  Surgeon: Chandler Pereira MD;  Location: [facility] OR;  Service: Pain Management;  Laterality: N/A;    EPIDURAL STEROID INJECTION INTO LUMBAR SPINE N/A 06/04/2024    Procedure: Injection-steroid-epidural-lumbar  L5/S1;  Surgeon: Chandler Pereira MD;  Location: [facility] OR;  Service: Pain Management;  Laterality: N/A;    HYSTERECTOMY      JAMIE with BSO- age 42    INJECTION OF ANESTHETIC AGENT AROUND MEDIAL BRANCH NERVES INNERVATING LUMBAR FACET JOINT Bilateral 09/13/2024    Procedure: Block-nerve-medial branch-lumbar    L4/5, L5/S1;  Surgeon: Chandler Pereira MD;  Location: [facility] OR;  Service: Pain Management;  Laterality: Bilateral;    INJECTION OF ANESTHETIC AGENT AROUND MEDIAL BRANCH NERVES INNERVATING LUMBAR FACET JOINT Bilateral 10/2/2024    Procedure: Block-nerve-medial branch-lumbar   L4/5, L5/S1;  Surgeon: Chandler Pereira MD;  Location: [facility] OR;  Service: Pain Management;  Laterality: Bilateral;    OOPHORECTOMY       RADIOFREQUENCY ABLATION Bilateral 10/28/2024    Procedure: Radiofrequency Ablation- L4/5, L5/S1;  Surgeon: Chandler Pereira MD;  Location: [facility] OR;  Service: Pain Management;  Laterality: Bilateral;      Family History   Problem Relation Name Age of Onset    Heart disease Mother          had childhood rheumatic fever    Cancer Mother          uterine cancer    Cervical cancer Mother  28    Atrial fibrillation Father      Macular degeneration Maternal Grandmother      Cancer Maternal Grandmother          uterine cancer    Stroke Maternal Grandmother      Heart disease Maternal Grandmother  50        MI    Stroke Maternal Grandfather      Nephrolithiasis Neg Hx      Glaucoma Neg Hx        Wt Readings from Last 10 Encounters:   04/07/25 76.5 kg (168 lb 10.4 oz)   01/27/25 75.3 kg (166 lb)   01/13/25 78.8 kg (173 lb 11.6 oz)   11/26/24 78.8 kg (173 lb 13.3 oz)   10/28/24 80.3 kg (177 lb)   09/27/24 82.6 kg (182 lb)   09/23/24 82.6 kg (182 lb 1.6 oz)   09/10/24 85.7 kg (189 lb)   08/22/24 86.1 kg (189 lb 13.1 oz)   06/18/24 88.4 kg (194 lb 14.2 oz)     Lab Results   Component Value Date    WBC 4.58 11/26/2024    HGB 15.4 11/26/2024    HCT 47.5 11/26/2024    MCV 90 11/26/2024     11/26/2024     CMP  Sodium   Date Value Ref Range Status   11/26/2024 143 136 - 145 mmol/L Final     Potassium   Date Value Ref Range Status   11/26/2024 4.0 3.5 - 5.1 mmol/L Final     Chloride   Date Value Ref Range Status   11/26/2024 108 95 - 110 mmol/L Final     CO2   Date Value Ref Range Status   11/26/2024 28 23 - 29 mmol/L Final     Glucose   Date Value Ref Range Status   11/26/2024 103 70 - 110 mg/dL Final     BUN   Date Value Ref Range Status   11/26/2024 11 6 - 20 mg/dL Final     Creatinine   Date Value Ref Range Status   11/26/2024 0.9 0.5 - 1.4 mg/dL Final     Calcium   Date Value Ref Range Status   11/26/2024 10.2 8.7 - 10.5 mg/dL Final     Total Protein   Date Value Ref Range Status   11/26/2024 6.8 6.0 - 8.4 g/dL Final      Albumin   Date Value Ref Range Status   11/26/2024 4.0 3.5 - 5.2 g/dL Final     Total Bilirubin   Date Value Ref Range Status   11/26/2024 0.7 0.1 - 1.0 mg/dL Final     Comment:     For infants and newborns, interpretation of results should be based  on gestational age, weight and in agreement with clinical  observations.    Premature Infant recommended reference ranges:  Up to 24 hours.............<8.0 mg/dL  Up to 48 hours............<12.0 mg/dL  3-5 days..................<15.0 mg/dL  6-29 days.................<15.0 mg/dL       Alkaline Phosphatase   Date Value Ref Range Status   11/26/2024 71 40 - 150 U/L Final     AST   Date Value Ref Range Status   11/26/2024 19 10 - 40 U/L Final     ALT   Date Value Ref Range Status   11/26/2024 21 10 - 44 U/L Final     Anion Gap   Date Value Ref Range Status   11/26/2024 7 (L) 8 - 16 mmol/L Final     eGFR if    Date Value Ref Range Status   10/15/2021 >60.0 >60 mL/min/1.73 m^2 Final     eGFR if non    Date Value Ref Range Status   10/15/2021 >60.0 >60 mL/min/1.73 m^2 Final     Comment:     Calculation used to obtain the estimated glomerular filtration  rate (eGFR) is the CKD-EPI equation.        Lab Results   Component Value Date    TSH 2.188 11/26/2024             Reviewed prior medical records including endoscopy history (see surgical history).     Objective:      Physical Exam  Constitutional:       General: She is not in acute distress.     Appearance: She is well-developed.   HENT:      Head: Normocephalic.      Right Ear: Hearing normal.      Left Ear: Hearing normal.      Nose: Nose normal.      Mouth/Throat:      Mouth: No oral lesions.      Pharynx: Uvula midline.   Eyes:      General: Lids are normal.      Conjunctiva/sclera: Conjunctivae normal.      Pupils: Pupils are equal, round, and reactive to light.   Neck:      Trachea: Trachea normal.   Cardiovascular:      Rate and Rhythm: Normal rate and regular rhythm.      Heart  sounds: Normal heart sounds. No murmur heard.  Pulmonary:      Effort: Pulmonary effort is normal. No respiratory distress.      Breath sounds: Normal breath sounds. No stridor. No wheezing.   Abdominal:      General: Bowel sounds are normal. There is no distension.      Palpations: Abdomen is soft. There is no mass.      Tenderness: There is abdominal tenderness (mild) in the right lower quadrant, periumbilical area, suprapubic area and left lower quadrant. There is no guarding or rebound.   Musculoskeletal:         General: Normal range of motion.      Cervical back: Normal range of motion.   Skin:     General: Skin is warm and dry.      Findings: No rash.      Comments: Non jaundiced   Neurological:      Mental Status: She is alert and oriented to person, place, and time.   Psychiatric:         Speech: Speech normal.         Behavior: Behavior normal. Behavior is cooperative.           Assessment:       1. History of colonoscopy    2. Irritable bowel syndrome with diarrhea    3. Abnormal findings in stool    4. Screening for colon cancer           Plan:   All diagnoses and orders for this visit:    History of colonoscopy   - Discussed results of colonoscopy, patient verbalized understanding    Irritable bowel syndrome with diarrhea   - Recommended increase fiber in diet, especially soluble fiber since this can help bulk up the stool consistency and may help to slow down how fast the stool goes through the colon and can prevent diarrhea    - Continue Questran 4 gm once daily    Abnormal findings in stool   - Discussed repeat stool studies but patient declined at this time    Screening for colon cancer   - Next surveillance colonoscopy due 1/2035    If no improvement in symptoms or symptoms worsen, call/follow-up at clinic or go to ER

## 2025-04-29 ENCOUNTER — OFFICE VISIT (OUTPATIENT)
Dept: PSYCHIATRY | Facility: CLINIC | Age: 60
End: 2025-04-29
Payer: COMMERCIAL

## 2025-04-29 DIAGNOSIS — F43.12 CHRONIC POST-TRAUMATIC STRESS DISORDER (PTSD): Primary | ICD-10-CM

## 2025-04-29 DIAGNOSIS — F42.4 EXCORIATION (SKIN-PICKING) DISORDER: ICD-10-CM

## 2025-04-29 DIAGNOSIS — F33.1 MAJOR DEPRESSIVE DISORDER, RECURRENT EPISODE, MODERATE: ICD-10-CM

## 2025-04-29 DIAGNOSIS — F51.02 INSOMNIA DUE TO PSYCHOLOGICAL STRESS: ICD-10-CM

## 2025-04-29 DIAGNOSIS — F41.1 GAD (GENERALIZED ANXIETY DISORDER): ICD-10-CM

## 2025-04-29 DIAGNOSIS — F31.81 BIPOLAR 2 DISORDER: ICD-10-CM

## 2025-04-29 RX ORDER — LAMOTRIGINE 25 MG/1
75 TABLET ORAL NIGHTLY
Qty: 270 TABLET | Refills: 0 | Status: SHIPPED | OUTPATIENT
Start: 2025-04-29 | End: 2025-07-28

## 2025-04-29 NOTE — PROGRESS NOTES
Outpatient Psychiatry Follow-Up Visit    Clinical Status of Patient: Outpatient (Ambulatory)  04/29/2025    The patient location is:  [place], [place]  The patient phone number is: [phone]   Visit type: Virtual visit with synchronous audio and video  Each patient to whom he or she provides medical services by telemedicine is:  (1) informed of the relationship between the physician and patient and the respective role of any other health care provider with respect to management of the patient; and (2) notified that he or she may decline to receive medical services by telemedicine and may withdraw from such care at any time.     Chief Complaint: Pt is a 60 y.o. female who presents today for a follow-up. Met with patient.       Interval History and Content of Current Session:  Interim Events/Subjective Report/Content of Current Session:  follow up appointment.    Pt is a 60 y.o. 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     Past Psychiatric hx:   Pt. is a 59 y.o. female with a past psychiatric hx of Major depressive disorder, recurrent episode, moderate, Bipolar 2 disorder, HUNTER (generalized anxiety disorder) presenting to the clinic for an initial evaluation and treatment. Past medical history outlined below; she is currently taking clonazePAM (KLONOPIN), lamoTRIgine (LAMICTAL), QUEtiapine (SEROQUEL), EScitalopram oxalate (LEXAPRO), prescribed and managed by Dr. Swartz; she reports that these medications have not worked as well recently.     4/2/25: Pt presents to OP Psychiatry for routine follow-up for treatment/medication management of Bipolar 2, PTSD, MDD, HUNTER, Insomnia. Reports having the best time in [place] and she started feeling depressed when she got home, states she wants to move to  [place], has been at odds with her daughter and her family and feels guilty that they get mad at her for having different opinions. Discussed increasing Effexor to help get through this period, pt in agreement to try. Pt denies SI/HI/AVH, self-harm, plan, or intent. Pt verbalizes understanding of medication instructions through teach back. No other issues, concerns, or problems, will reassess symptoms and medications in 30 days or PRN if symptoms worsen.     Past Medical hx:   Past Medical History:   Diagnosis Date    Allergy     Chronic diarrhea     Depression     Hx of colonic polyps 08/04/2014    per colonoscopy report    Hypertension     Migraine     Sleep apnea     Suicidal thoughts 12/2019    [facility] tx     Thyroid disease     Hypothyroid, MNG        Interim hx:  Medication changes last visit:     1. Continue clonazePAM (KLONOPIN) 1 MG tablet - Take 1 tablet (1 mg total) by mouth daily as needed for Anxiety. Discussed risk of decreased RT, sedation, addictive potential, and not to mix with alcohol.   2. Continue lamoTRIgine (LAMICTAL) 25 MG tablet - Take 3 tablets (75 mg total) by mouth once daily at bedtime for bipolar disorder.  3. Continue traZODone (DESYREL) 100 MG tablet - Take 1 tablet (100 mg total) by mouth nightly as needed for Insomnia.  4. Increase venlafaxine (EFFEXOR-XR) 150 MG + 75 MG 24 hr capsule - Take 1 capsule each (225 mg total) by mouth once daily for depression, anxiety, PTSD. Discussed potential for GI side effects, sexual dysfunction, mood destabilization, headaches.    Anxiety:  Depression:   Sleep: Good, manageable  Appetite: Same, no issues     Denies suicidal/homicidal ideations.  Denies hopelessness/worthlessness.    Denies auditory/visual hallucinations.      Tobacco:  quit 5 years ago  Alcohol:  Rarely  Drug use:  denies  Caffeine:  1 cup daily, rarely a coke      Review of Systems   PSYCHIATRIC: Pertinent items are noted in the narrative.        CONSTITUTIONAL: weight  stable        M/S: no pain today         ENT: no allergies noted today        ABD: no n/v/d     Past Medical, Family and Social History: The patient's past medical, family and social history have been reviewed and updated as appropriate within the electronic medical record. See encounter notes.     Medication Compliance: yes      Side effects: tolerates     Risk Parameters:  Patient reports no suicidal ideation  Patient reports no homicidal ideation  Patient reports no self-injurious behavior  Patient reports no violent behavior     Exam (detailed: at least 9 elements; comprehensive: all 15 elements)   Constitutional  Vitals:  Most recent vital signs, dated less than 90 days prior to this appointment, were reviewed.     General:  unremarkable, age appropriate, casual attire, good eye contact, good rapport       Musculoskeletal  Muscle Strength/Tone:  no flaccidity, no tremor    Gait & Station:  normal      Psychiatric                       Speech:  normal tone, normal rate, rhythm, and volume   Mood & Affect:   Euthymic, congruent, appropriate         Thought Process:   Goal directed; Linear    Associations:   intact   Thought Content:   No SI/HI, delusions, or paranoia, no AV/VH   Insight & Judgement:   Good, adequate to circumstances   Orientation:   grossly intact; alert and oriented x 4    Memory:  intact for content of interview    Language:  grossly intact, can repeat    Attention Span  : Grossly intact for content of interview   Fund of Knowledge:   intact and appropriate to age and level of education        Assessment and Diagnosis   Status/Progress: Based on the examination today, the patient's problem(s) is/are under fair control.  New problems have not been presented today. Comorbidities are not currently complicating management of the primary condition.      Impression:  Pt presents to OP Psychiatry for routine follow-up for treatment/medication management of Bipolar 2, PTSD, MDD, HUNTER, Insomnia. Pt  reports all medications effectively treating symptoms, denies need for any medication changes at this time. Despite having an argument with her  and staying in a hotel currently, states the increase in Effexor has helped significantly. States she takes the Klonopin sparingly, and this writer will fill at next refill. Pt denies SI/HI/AVH, self-harm, plan, or intent. Pt verbalizes understanding of medication instructions through teach back. No other issues, concerns, or problems, will reassess symptoms and medications in 3 months or PRN if symptoms worsen.      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 Continue clonazePAM (KLONOPIN) 1 MG tablet - Take 1 tablet (1 mg total) by mouth daily as needed for Anxiety. Discussed risk of decreased RT, sedation, addictive potential, and not to mix with alcohol.      2. Continue lamoTRIgine (LAMICTAL) 25 MG tablet - Take 3 tablets (75 mg total) by mouth once daily at bedtime for bipolar disorder.     3. Continue traZODone (DESYREL) 100 MG tablet - Take 1 tablet (100 mg total) by mouth nightly as needed for Insomnia.     4. Continue venlafaxine (EFFEXOR-XR) 150 MG + 75 MG 24 hr capsule - Take 1 capsule each (225 mg total) by mouth once daily for depression, anxiety, PTSD. Discussed potential for GI side effects, sexual dysfunction, mood destabilization, headaches.     5. Call to report any worsening of symptoms or problems with the medication. Pt instructed to go to ER with thoughts of harming self, others.     6. Patient given contact # for psychotherapists at [facility] and also instructed she may check with insurance for list of providers.         Labs: none        Psychotherapy:    Target symptoms: inattention/distractibility, anxiety    Why chosen therapy is appropriate versus another modality: relevant to diagnosis, patient responds to this modality  Outcome monitoring methods: self-report, observation, feedback from family   Therapeutic intervention type: supportive psychotherapy  Topics discussed/themes: building skills sets for symptom management, symptom recognition, nutrition, exercise  The patient's response to the intervention is accepting. The patient's progress toward treatment goals is positive progress.  Duration of intervention: 20 minutes         Return to clinic:      3 months or PRN if symptoms worsen    -Spent 50min face to face with the pt; >50% time spent in counseling   -Supportive therapy and psychoeducation provided  -R/B/SE's of medications discussed with the pt who expresses understanding and chooses to take medications as prescribed.   -Pt instructed to call clinic, 911 or go to nearest emergency room if sxs worsen or pt is in   crisis. The pt expresses understanding.      Toya Hauser NP  Psychiatric-Mental Health Nurse Practitioner  Department of Psychiatry    [facility] [place] [address] [place] [address]  Phone:  [phone]  Fax: [phone]

## 2025-05-20 DIAGNOSIS — E78.5 HYPERLIPIDEMIA, UNSPECIFIED HYPERLIPIDEMIA TYPE: ICD-10-CM

## 2025-05-20 NOTE — TELEPHONE ENCOUNTER
Refill Routing Note   Medication(s) are not appropriate for processing by [facility] for the following reason(s):        Outside of protocol    [facility] action(s):  Route             Appointments  past 12m or future 3m with PCP    Date Provider   Last Visit   1/13/2025 Yesi Swartz, DO   Next Visit   7/17/2025 Yesi Swartz, DO   ED visits in past 90 days: 0        Note composed:4:53 PM 05/20/2025

## 2025-05-20 NOTE — TELEPHONE ENCOUNTER
No care due was identified.  [facility] Embedded Care Due Messages. Reference number: [id number].   5/20/2025 4:34:28 PM CDT

## 2025-05-21 RX ORDER — GABAPENTIN 300 MG/1
300 CAPSULE ORAL NIGHTLY
Qty: 90 CAPSULE | Refills: 3 | Status: SHIPPED | OUTPATIENT
Start: 2025-05-21

## 2025-05-21 RX ORDER — ASPIRIN 81 MG/1
81 TABLET ORAL DAILY
Qty: 90 TABLET | Refills: 3 | Status: SHIPPED | OUTPATIENT
Start: 2025-05-21 | End: 2026-05-21

## 2025-06-22 DIAGNOSIS — F41.1 GAD (GENERALIZED ANXIETY DISORDER): ICD-10-CM

## 2025-06-22 DIAGNOSIS — F43.12 CHRONIC POST-TRAUMATIC STRESS DISORDER (PTSD): ICD-10-CM

## 2025-06-22 DIAGNOSIS — F33.1 MAJOR DEPRESSIVE DISORDER, RECURRENT EPISODE, MODERATE: ICD-10-CM

## 2025-06-23 RX ORDER — VENLAFAXINE HYDROCHLORIDE 150 MG/1
150 CAPSULE, EXTENDED RELEASE ORAL EVERY MORNING
Qty: 90 CAPSULE | Refills: 0 | Status: SHIPPED | OUTPATIENT
Start: 2025-06-23 | End: 2025-09-21

## 2025-06-27 DIAGNOSIS — F51.02 INSOMNIA DUE TO PSYCHOLOGICAL STRESS: ICD-10-CM

## 2025-06-27 RX ORDER — ROSUVASTATIN CALCIUM 10 MG/1
10 TABLET, COATED ORAL DAILY
Qty: 90 TABLET | Refills: 1 | Status: SHIPPED | OUTPATIENT
Start: 2025-06-27

## 2025-06-27 RX ORDER — TRAZODONE HYDROCHLORIDE 100 MG/1
100 TABLET ORAL NIGHTLY PRN
Qty: 90 TABLET | Refills: 1 | Status: SHIPPED | OUTPATIENT
Start: 2025-06-27 | End: 2025-12-24

## 2025-06-27 NOTE — TELEPHONE ENCOUNTER
No care due was identified.  [facility] Embedded Care Due Messages. Reference number: [id number].   6/27/2025 1:12:36 PM CDT

## 2025-06-28 ENCOUNTER — PATIENT MESSAGE (OUTPATIENT)
Dept: PSYCHIATRY | Facility: CLINIC | Age: 60
End: 2025-06-28
Payer: COMMERCIAL

## 2025-06-28 ENCOUNTER — PATIENT MESSAGE (OUTPATIENT)
Dept: FAMILY MEDICINE | Facility: CLINIC | Age: 60
End: 2025-06-28
Payer: COMMERCIAL

## 2025-06-28 NOTE — TELEPHONE ENCOUNTER
Refill Decision Note   Delaney Bert  is requesting a refill authorization.  Brief Assessment and Rationale for Refill:  Approve     Medication Therapy Plan:         Comments:     Note composed:8:23 PM 06/27/2025

## 2025-06-30 DIAGNOSIS — F41.1 GAD (GENERALIZED ANXIETY DISORDER): ICD-10-CM

## 2025-06-30 RX ORDER — CLONAZEPAM 1 MG/1
1 TABLET ORAL DAILY PRN
Qty: 30 TABLET | Refills: 0 | Status: SHIPPED | OUTPATIENT
Start: 2025-06-30 | End: 2025-07-30

## 2025-07-12 ENCOUNTER — LAB VISIT (OUTPATIENT)
Dept: LAB | Facility: HOSPITAL | Age: 60
End: 2025-07-12
Payer: COMMERCIAL

## 2025-07-12 DIAGNOSIS — I25.118 CORONARY ARTERY DISEASE INVOLVING NATIVE CORONARY ARTERY OF NATIVE HEART WITH OTHER FORM OF ANGINA PECTORIS: ICD-10-CM

## 2025-07-12 DIAGNOSIS — R73.03 PREDIABETES: ICD-10-CM

## 2025-07-12 LAB
ABSOLUTE EOSINOPHIL (OHS): 0.23 K/UL
ABSOLUTE MONOCYTE (OHS): 0.63 K/UL (ref 0.3–1)
ABSOLUTE NEUTROPHIL COUNT (OHS): 4.74 K/UL (ref 1.8–7.7)
ALBUMIN SERPL BCP-MCNC: 4.4 G/DL (ref 3.5–5.2)
ALP SERPL-CCNC: 66 UNIT/L (ref 40–150)
ALT SERPL W/O P-5'-P-CCNC: 18 UNIT/L (ref 10–44)
ANION GAP (OHS): 7 MMOL/L (ref 8–16)
AST SERPL-CCNC: 17 UNIT/L (ref 11–45)
BASOPHILS # BLD AUTO: 0.03 K/UL
BASOPHILS NFR BLD AUTO: 0.4 %
BILIRUB SERPL-MCNC: 0.8 MG/DL (ref 0.1–1)
BUN SERPL-MCNC: 13 MG/DL (ref 6–20)
CALCIUM SERPL-MCNC: 9.6 MG/DL (ref 8.7–10.5)
CHLORIDE SERPL-SCNC: 104 MMOL/L (ref 95–110)
CO2 SERPL-SCNC: 30 MMOL/L (ref 23–29)
CREAT SERPL-MCNC: 0.9 MG/DL (ref 0.5–1.4)
EAG (OHS): 97 MG/DL (ref 68–131)
ERYTHROCYTE [DISTWIDTH] IN BLOOD BY AUTOMATED COUNT: 12.3 % (ref 11.5–14.5)
GFR SERPLBLD CREATININE-BSD FMLA CKD-EPI: >60 ML/MIN/1.73/M2
GLUCOSE SERPL-MCNC: 89 MG/DL (ref 70–110)
HBA1C MFR BLD: 5 % (ref 4–5.6)
HCT VFR BLD AUTO: 44.7 % (ref 37–48.5)
HGB BLD-MCNC: 14.4 GM/DL (ref 12–16)
IMM GRANULOCYTES # BLD AUTO: 0.02 K/UL (ref 0–0.04)
IMM GRANULOCYTES NFR BLD AUTO: 0.3 % (ref 0–0.5)
LYMPHOCYTES # BLD AUTO: 1.58 K/UL (ref 1–4.8)
MCH RBC QN AUTO: 29.1 PG (ref 27–31)
MCHC RBC AUTO-ENTMCNC: 32.2 G/DL (ref 32–36)
MCV RBC AUTO: 91 FL (ref 82–98)
NUCLEATED RBC (/100WBC) (OHS): 0 /100 WBC
PLATELET # BLD AUTO: 244 K/UL (ref 150–450)
PMV BLD AUTO: 12 FL (ref 9.2–12.9)
POTASSIUM SERPL-SCNC: 4 MMOL/L (ref 3.5–5.1)
PROT SERPL-MCNC: 7 GM/DL (ref 6–8.4)
RBC # BLD AUTO: 4.94 M/UL (ref 4–5.4)
RELATIVE EOSINOPHIL (OHS): 3.2 %
RELATIVE LYMPHOCYTE (OHS): 21.9 % (ref 18–48)
RELATIVE MONOCYTE (OHS): 8.7 % (ref 4–15)
RELATIVE NEUTROPHIL (OHS): 65.5 % (ref 38–73)
SODIUM SERPL-SCNC: 141 MMOL/L (ref 136–145)
WBC # BLD AUTO: 7.23 K/UL (ref 3.9–12.7)

## 2025-07-12 PROCEDURE — 85025 COMPLETE CBC W/AUTO DIFF WBC: CPT

## 2025-07-12 PROCEDURE — 36415 COLL VENOUS BLD VENIPUNCTURE: CPT | Mod: PO

## 2025-07-12 PROCEDURE — 80053 COMPREHEN METABOLIC PANEL: CPT

## 2025-07-12 PROCEDURE — 83036 HEMOGLOBIN GLYCOSYLATED A1C: CPT

## 2025-07-15 ENCOUNTER — PATIENT MESSAGE (OUTPATIENT)
Dept: FAMILY MEDICINE | Facility: CLINIC | Age: 60
End: 2025-07-15
Payer: COMMERCIAL

## 2025-07-17 ENCOUNTER — PATIENT MESSAGE (OUTPATIENT)
Dept: FAMILY MEDICINE | Facility: CLINIC | Age: 60
End: 2025-07-17

## 2025-07-17 ENCOUNTER — OFFICE VISIT (OUTPATIENT)
Dept: FAMILY MEDICINE | Facility: CLINIC | Age: 60
End: 2025-07-17
Payer: COMMERCIAL

## 2025-07-17 VITALS
HEART RATE: 73 BPM | SYSTOLIC BLOOD PRESSURE: 126 MMHG | DIASTOLIC BLOOD PRESSURE: 88 MMHG | HEIGHT: 63 IN | RESPIRATION RATE: 18 BRPM | BODY MASS INDEX: 29.69 KG/M2 | TEMPERATURE: 98 F | WEIGHT: 167.56 LBS | OXYGEN SATURATION: 96 %

## 2025-07-17 DIAGNOSIS — R42 VERTIGO: ICD-10-CM

## 2025-07-17 DIAGNOSIS — G43.009 MIGRAINE WITHOUT AURA AND WITHOUT STATUS MIGRAINOSUS, NOT INTRACTABLE: ICD-10-CM

## 2025-07-17 DIAGNOSIS — E66.09 CLASS 1 OBESITY DUE TO EXCESS CALORIES WITH SERIOUS COMORBIDITY AND BODY MASS INDEX (BMI) OF 32.0 TO 32.9 IN ADULT: ICD-10-CM

## 2025-07-17 DIAGNOSIS — R15.9 INCONTINENCE OF FECES, UNSPECIFIED FECAL INCONTINENCE TYPE: ICD-10-CM

## 2025-07-17 DIAGNOSIS — R91.1 LUNG NODULE: ICD-10-CM

## 2025-07-17 DIAGNOSIS — E66.811 CLASS 1 OBESITY DUE TO EXCESS CALORIES WITH SERIOUS COMORBIDITY AND BODY MASS INDEX (BMI) OF 32.0 TO 32.9 IN ADULT: ICD-10-CM

## 2025-07-17 DIAGNOSIS — I25.118 CORONARY ARTERY DISEASE INVOLVING NATIVE CORONARY ARTERY OF NATIVE HEART WITH OTHER FORM OF ANGINA PECTORIS: ICD-10-CM

## 2025-07-17 DIAGNOSIS — F33.1 MAJOR DEPRESSIVE DISORDER, RECURRENT EPISODE, MODERATE: ICD-10-CM

## 2025-07-17 DIAGNOSIS — F31.81 BIPOLAR 2 DISORDER: ICD-10-CM

## 2025-07-17 DIAGNOSIS — E78.5 HYPERLIPIDEMIA, UNSPECIFIED HYPERLIPIDEMIA TYPE: ICD-10-CM

## 2025-07-17 DIAGNOSIS — M47.27 OSTEOARTHRITIS OF SPINE WITH RADICULOPATHY, LUMBOSACRAL REGION: ICD-10-CM

## 2025-07-17 DIAGNOSIS — J30.9 CHRONIC ALLERGIC RHINITIS: ICD-10-CM

## 2025-07-17 DIAGNOSIS — I10 ESSENTIAL HYPERTENSION: ICD-10-CM

## 2025-07-17 DIAGNOSIS — J45.40 MODERATE PERSISTENT ASTHMA WITHOUT COMPLICATION: ICD-10-CM

## 2025-07-17 DIAGNOSIS — G47.33 OSA (OBSTRUCTIVE SLEEP APNEA): Primary | ICD-10-CM

## 2025-07-17 DIAGNOSIS — F41.1 GAD (GENERALIZED ANXIETY DISORDER): ICD-10-CM

## 2025-07-17 DIAGNOSIS — F51.04 PSYCHOPHYSIOLOGICAL INSOMNIA: ICD-10-CM

## 2025-07-17 DIAGNOSIS — G47.33 OSA (OBSTRUCTIVE SLEEP APNEA): ICD-10-CM

## 2025-07-17 DIAGNOSIS — F43.10 PTSD (POST-TRAUMATIC STRESS DISORDER): ICD-10-CM

## 2025-07-17 DIAGNOSIS — R73.03 PREDIABETES: ICD-10-CM

## 2025-07-17 DIAGNOSIS — R27.0 ATAXIA: Primary | ICD-10-CM

## 2025-07-17 DIAGNOSIS — F41.0 PANIC ATTACK: ICD-10-CM

## 2025-07-17 LAB
T4 FREE SERPL-MCNC: 0.84 NG/DL (ref 0.71–1.51)
TSH SERPL-ACNC: 5.77 UIU/ML (ref 0.4–4)
VIT B12 SERPL-MCNC: 408 PG/ML (ref 210–950)

## 2025-07-17 PROCEDURE — 84207 ASSAY OF VITAMIN B-6: CPT | Performed by: INTERNAL MEDICINE

## 2025-07-17 PROCEDURE — 84443 ASSAY THYROID STIM HORMONE: CPT | Performed by: INTERNAL MEDICINE

## 2025-07-17 PROCEDURE — 82607 VITAMIN B-12: CPT | Performed by: INTERNAL MEDICINE

## 2025-07-17 PROCEDURE — 84425 ASSAY OF VITAMIN B-1: CPT | Performed by: INTERNAL MEDICINE

## 2025-07-17 PROCEDURE — 84439 ASSAY OF FREE THYROXINE: CPT | Performed by: INTERNAL MEDICINE

## 2025-07-17 RX ORDER — FLUTICASONE PROPIONATE AND SALMETEROL 100; 50 UG/1; UG/1
1 POWDER RESPIRATORY (INHALATION) 2 TIMES DAILY
Qty: 180 EACH | Refills: 3 | Status: SHIPPED | OUTPATIENT
Start: 2025-07-17 | End: 2026-07-17

## 2025-07-17 RX ORDER — FLUTICASONE PROPIONATE 50 MCG
2 SPRAY, SUSPENSION (ML) NASAL DAILY
Qty: 48 G | Refills: 3 | Status: SHIPPED | OUTPATIENT
Start: 2025-07-17
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
## 2025-07-17 NOTE — PATIENT INSTRUCTIONS
Sent me the settings to your cpap (number cm H2O)    Please call the scheduling line at [phone] to schedule pulmonary function test appointments at [place].

## 2025-07-18 ENCOUNTER — PATIENT MESSAGE (OUTPATIENT)
Dept: FAMILY MEDICINE | Facility: CLINIC | Age: 60
End: 2025-07-18
Payer: COMMERCIAL

## 2025-07-18 DIAGNOSIS — E03.9 HYPOTHYROIDISM, UNSPECIFIED TYPE: Primary | ICD-10-CM

## 2025-07-18 RX ORDER — LEVOTHYROXINE SODIUM 88 UG/1
88 TABLET ORAL
Qty: 30 TABLET | Refills: 11 | Status: SHIPPED | OUTPATIENT
Start: 2025-07-18 | End: 2026-07-18

## 2025-07-21 ENCOUNTER — PATIENT MESSAGE (OUTPATIENT)
Dept: FAMILY MEDICINE | Facility: CLINIC | Age: 60
End: 2025-07-21
Payer: COMMERCIAL

## 2025-07-21 DIAGNOSIS — G47.33 OSA (OBSTRUCTIVE SLEEP APNEA): Primary | ICD-10-CM

## 2025-07-22 ENCOUNTER — PATIENT MESSAGE (OUTPATIENT)
Dept: PSYCHIATRY | Facility: CLINIC | Age: 60
End: 2025-07-22
Payer: COMMERCIAL

## 2025-07-22 DIAGNOSIS — F41.1 GAD (GENERALIZED ANXIETY DISORDER): ICD-10-CM

## 2025-07-22 DIAGNOSIS — F43.12 CHRONIC POST-TRAUMATIC STRESS DISORDER (PTSD): ICD-10-CM

## 2025-07-22 DIAGNOSIS — F33.1 MAJOR DEPRESSIVE DISORDER, RECURRENT EPISODE, MODERATE: ICD-10-CM

## 2025-07-22 RX ORDER — VENLAFAXINE HYDROCHLORIDE 75 MG/1
75 CAPSULE, EXTENDED RELEASE ORAL DAILY
Qty: 90 CAPSULE | Refills: 1 | Status: SHIPPED | OUTPATIENT
Start: 2025-07-22 | End: 2026-01-18

## 2025-07-22 RX ORDER — VENLAFAXINE HYDROCHLORIDE 75 MG/1
75 CAPSULE, EXTENDED RELEASE ORAL DAILY
Qty: 90 CAPSULE | Refills: 1 | OUTPATIENT
Start: 2025-07-22 | End: 2026-01-18

## 2025-07-29 ENCOUNTER — OFFICE VISIT (OUTPATIENT)
Dept: PSYCHIATRY | Facility: CLINIC | Age: 60
End: 2025-07-29
Payer: COMMERCIAL

## 2025-07-29 DIAGNOSIS — F31.81 BIPOLAR 2 DISORDER: Primary | ICD-10-CM

## 2025-07-29 DIAGNOSIS — F41.1 GAD (GENERALIZED ANXIETY DISORDER): ICD-10-CM

## 2025-07-29 DIAGNOSIS — F42.4 EXCORIATION (SKIN-PICKING) DISORDER: ICD-10-CM

## 2025-07-29 DIAGNOSIS — F33.1 MAJOR DEPRESSIVE DISORDER, RECURRENT EPISODE, MODERATE: ICD-10-CM

## 2025-07-29 DIAGNOSIS — F51.02 INSOMNIA DUE TO PSYCHOLOGICAL STRESS: ICD-10-CM

## 2025-07-29 DIAGNOSIS — F43.12 CHRONIC POST-TRAUMATIC STRESS DISORDER (PTSD): ICD-10-CM

## 2025-07-29 NOTE — PATIENT INSTRUCTIONS
1. Continue clonazePAM (KLONOPIN) 1 MG tablet - Take 1 tablet (1 mg total) by mouth daily as needed for Anxiety. Discussed risk of decreased RT, sedation, addictive potential, and not to mix with alcohol.      2. Continue lamoTRIgine (LAMICTAL) 25 MG tablet - Take 3 tablets (75 mg total) by mouth once daily at bedtime for bipolar disorder.     3. Continue traZODone (DESYREL) 100 MG tablet - Take 1 tablet (100 mg total) by mouth nightly as needed for Insomnia.     4. Continue venlafaxine (EFFEXOR-XR) 150 MG + 75 MG 24 hr capsule - Take 1 capsule each (225 mg total) by mouth once daily for depression, anxiety, PTSD.

## 2025-07-29 NOTE — PROGRESS NOTES
LMTCB regarding scheduled EBUS for Monday August 16 at 8 AM.       Patient contacted and informed EBUS is scheduled for 8-16-21 at 8 AM.  Patient was instructed to present to the Outpatient Surgery Entrance for 7 am this day, she will not be able to eat anything after midnight and will require a  to get home.     Outpatient Psychiatry Follow-Up Visit    Clinical Status of Patient: Outpatient (Ambulatory)  07/29/2025    The patient location is:  [place], [place]  The patient phone number is: [phone]   Visit type: Virtual visit with synchronous audio and video  Each patient to whom he or she provides medical services by telemedicine is:  (1) informed of the relationship between the physician and patient and the respective role of any other health care provider with respect to management of the patient; and (2) notified that he or she may decline to receive medical services by telemedicine and may withdraw from such care at any time.     Chief Complaint: Pt is a 60 y.o. female who presents today for a follow-up. Met with patient.       Interval History and Content of Current Session:  Interim Events/Subjective Report/Content of Current Session:  follow up appointment.    Pt is a 60 y.o. female with past psychiatric hx of Bipolar 2 disorder, Chronic post-traumatic stress disorder (PTSD), Major depressive disorder, recurrent episode, moderate, HUNTER (generalized anxiety disorder), Insomnia due to psychological stress, Skin picking disorder (OCD) who presents for follow up treatment.     Past Psychiatric hx:   Pt. is a 59 y.o. female with a past psychiatric hx of Major depressive disorder, recurrent episode, moderate, Bipolar 2 disorder, HUNTER (generalized anxiety disorder) presenting to the clinic for an initial evaluation and treatment. Past medical history outlined below; she is currently taking clonazePAM (KLONOPIN), lamoTRIgine (LAMICTAL), QUEtiapine (SEROQUEL), EScitalopram oxalate (LEXAPRO), prescribed and managed by Dr. Swartz; she reports that these medications have not worked as well recently.     4/29/25: Pt presents to OP Psychiatry for routine follow-up for treatment/medication management of Bipolar 2, PTSD, MDD, HUNTER, Insomnia. Pt reports all medications effectively treating symptoms, denies need for any medication  changes at this time. Despite having an argument with her  and staying in a hotel currently, states the increase in Effexor has helped significantly. States she takes the Klonopin sparingly, and this writer will fill at next refill. Pt denies SI/HI/AVH, self-harm, plan, or intent. Pt verbalizes understanding of medication instructions through teach back. No other issues, concerns, or problems, will reassess symptoms and medications in 3 months or PRN if symptoms worsen.     Past Medical hx:   Past Medical History:   Diagnosis Date    Allergy     Chronic diarrhea     Depression     Hx of colonic polyps 08/04/2014    per colonoscopy report    Hypertension     Migraine     Sleep apnea     Suicidal thoughts 12/2019    [facility] tx     Thyroid disease     Hypothyroid, MNG        Interim hx:  Medication changes last visit:     1. Continue clonazePAM (KLONOPIN) 1 MG tablet - Take 1 tablet (1 mg total) by mouth daily as needed for Anxiety. Discussed risk of decreased RT, sedation, addictive potential, and not to mix with alcohol.   2. Continue lamoTRIgine (LAMICTAL) 25 MG tablet - Take 3 tablets (75 mg total) by mouth once daily at bedtime for bipolar disorder.  3. Continue traZODone (DESYREL) 100 MG tablet - Take 1 tablet (100 mg total) by mouth nightly as needed for Insomnia.  4. Continue venlafaxine (EFFEXOR-XR) 150 MG + 75 MG 24 hr capsule - Take 1 capsule each (225 mg total) by mouth once daily for depression, anxiety, PTSD. Discussed potential for GI side effects, sexual dysfunction, mood destabilization, headaches.    Anxiety: Good, manageable  Depression: Good, manageable  Sleep: Manageable, rested  Appetite: Same, no issues     Denies suicidal/homicidal ideations.  Denies hopelessness/worthlessness.    Denies auditory/visual hallucinations.      Tobacco:  quit 5 years ago  Alcohol:  Rarely  Drug use:  denies  Caffeine:  1 cup daily, rarely a coke      Review of Systems   PSYCHIATRIC: Pertinent items  are noted in the narrative.        CONSTITUTIONAL: weight stable        M/S: no pain today         ENT: no allergies noted today        ABD: no n/v/d     Past Medical, Family and Social History: The patient's past medical, family and social history have been reviewed and updated as appropriate within the electronic medical record. See encounter notes.     Medication Compliance: yes      Side effects: tolerates     Risk Parameters:  Patient reports no suicidal ideation  Patient reports no homicidal ideation  Patient reports no self-injurious behavior  Patient reports no violent behavior     Exam (detailed: at least 9 elements; comprehensive: all 15 elements)   Constitutional  Vitals:  Most recent vital signs, dated less than 90 days prior to this appointment, were reviewed.     General:  unremarkable, age appropriate, casual attire, good eye contact, good rapport       Musculoskeletal  Muscle Strength/Tone:  no flaccidity, no tremor    Gait & Station:  normal      Psychiatric                       Speech:  normal tone, normal rate, rhythm, and volume   Mood & Affect:   Euthymic, congruent, appropriate         Thought Process:   Goal directed; Linear    Associations:   intact   Thought Content:   No SI/HI, delusions, or paranoia, no AV/VH   Insight & Judgement:   Good, adequate to circumstances   Orientation:   grossly intact; alert and oriented x 4    Memory:  intact for content of interview    Language:  grossly intact, can repeat    Attention Span  : Grossly intact for content of interview   Fund of Knowledge:   intact and appropriate to age and level of education        Assessment and Diagnosis   Status/Progress: Based on the examination today, the patient's problem(s) is/are under fair control.  New problems have not been presented today. Comorbidities are not currently complicating management of the primary condition.      Impression:  Pt presents to OP Psychiatry for routine follow-up for treatment/medication  management of Bipolar 2, PTSD, MDD, HUNTER, Insomnia, OCD. Pt reports all medications effectively treating symptoms, denies need for any medication changes at this time. Reports increased skin picking, discussed trying Luvox to replace Effexor, pt apprehensive as she has to get ready for 's upcoming procedures and does not want to trial and error during that period. Will send informational sheet through portal. Pt denies SI/HI/AVH, self-harm, plan, or intent. Pt verbalizes understanding of medication instructions through teach back. No other issues, concerns, or problems, will reassess symptoms and medications in 3 months or PRN if symptoms worsen.      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 Continue clonazePAM (KLONOPIN) 1 MG tablet - Take 1 tablet (1 mg total) by mouth daily as needed for Anxiety. Discussed risk of decreased RT, sedation, addictive potential, and not to mix with alcohol.      2. Continue lamoTRIgine (LAMICTAL) 25 MG tablet - Take 3 tablets (75 mg total) by mouth once daily at bedtime for bipolar disorder.     3. Continue traZODone (DESYREL) 100 MG tablet - Take 1 tablet (100 mg total) by mouth nightly as needed for Insomnia.     4. Continue venlafaxine (EFFEXOR-XR) 150 MG + 75 MG 24 hr capsule - Take 1 capsule each (225 mg total) by mouth once daily for depression, anxiety, PTSD. Discussed potential for GI side effects, sexual dysfunction, mood destabilization, headaches.     5. Call to report any worsening of symptoms or problems with the medication. Pt instructed to go to ER with thoughts of harming self, others.     6. Patient given contact # for psychotherapists at [facility] and also instructed she  may check with insurance for list of providers.         Labs: none         Return to clinic:      3 months or PRN if symptoms worsen    -Spent 30min face to face with the pt; >50% time spent in counseling   -Supportive therapy and psychoeducation provided  -R/B/SE's of medications discussed with the pt who expresses understanding and chooses to take medications as prescribed.   -Pt instructed to call clinic, 911 or go to nearest emergency room if sxs worsen or pt is in   crisis. The pt expresses understanding.      Toya Hauser NP  Psychiatric-Mental Health Nurse Practitioner  Department of Psychiatry    [facility] [place] [address] [place] [address]  Phone:  [phone]  Fax: [phone]

## 2025-08-05 ENCOUNTER — HOSPITAL ENCOUNTER (OUTPATIENT)
Dept: RADIOLOGY | Facility: HOSPITAL | Age: 60
Discharge: HOME | End: 2025-08-05
Attending: INTERNAL MEDICINE
Payer: COMMERCIAL

## 2025-08-05 DIAGNOSIS — R27.0 ATAXIA: ICD-10-CM

## 2025-08-05 PROCEDURE — 25500020 PHARM REV CODE 255: Mod: PO | Performed by: INTERNAL MEDICINE

## 2025-08-05 PROCEDURE — A9585 GADOBUTROL INJECTION: HCPCS | Mod: PO | Performed by: INTERNAL MEDICINE

## 2025-08-05 PROCEDURE — 70553 MRI BRAIN STEM W/O & W/DYE: CPT | Mod: TC,PO

## 2025-08-05 PROCEDURE — 70553 MRI BRAIN STEM W/O & W/DYE: CPT | Mod: 26,,, | Performed by: RADIOLOGY

## 2025-08-05 RX ORDER — GADOBUTROL 604.72 MG/ML
7 INJECTION INTRAVENOUS
Status: COMPLETED | OUTPATIENT
Start: 2025-08-05 | End: 2025-08-05

## 2025-08-05 RX ADMIN — GADOBUTROL 7 ML: 604.72 INJECTION INTRAVENOUS at 08:08

## 2025-08-06 ENCOUNTER — PATIENT MESSAGE (OUTPATIENT)
Dept: FAMILY MEDICINE | Facility: CLINIC | Age: 60
End: 2025-08-06
Payer: COMMERCIAL

## 2025-08-11 ENCOUNTER — PATIENT MESSAGE (OUTPATIENT)
Dept: NEUROLOGY | Facility: CLINIC | Age: 60
End: 2025-08-11
Payer: COMMERCIAL

## 2025-08-11 ENCOUNTER — TELEPHONE (OUTPATIENT)
Dept: NEUROLOGY | Facility: CLINIC | Age: 60
End: 2025-08-11
Payer: COMMERCIAL

## 2025-08-11 ENCOUNTER — OFFICE VISIT (OUTPATIENT)
Facility: CLINIC | Age: 60
End: 2025-08-11
Payer: COMMERCIAL

## 2025-08-11 DIAGNOSIS — R26.89 IMBALANCE: ICD-10-CM

## 2025-08-11 DIAGNOSIS — G43.109 MIGRAINE WITH AURA AND WITHOUT STATUS MIGRAINOSUS, NOT INTRACTABLE: Primary | ICD-10-CM

## 2025-08-11 PROCEDURE — 98006 SYNCH AUDIO-VIDEO EST MOD 30: CPT | Mod: 95,,, | Performed by: PHYSICIAN ASSISTANT

## 2025-08-11 PROCEDURE — 1159F MED LIST DOCD IN RCRD: CPT | Mod: CPTII,95,, | Performed by: PHYSICIAN ASSISTANT

## 2025-08-11 PROCEDURE — G2211 COMPLEX E/M VISIT ADD ON: HCPCS | Mod: 95,,, | Performed by: PHYSICIAN ASSISTANT

## 2025-08-11 PROCEDURE — 3044F HG A1C LEVEL LT 7.0%: CPT | Mod: CPTII,95,, | Performed by: PHYSICIAN ASSISTANT

## 2025-08-11 PROCEDURE — 1160F RVW MEDS BY RX/DR IN RCRD: CPT | Mod: CPTII,95,, | Performed by: PHYSICIAN ASSISTANT

## 2025-08-11 RX ORDER — ATOGEPANT 30 MG/1
30 TABLET ORAL DAILY
Qty: 30 TABLET | Refills: 5 | Status: SHIPPED | OUTPATIENT
Start: 2025-08-11 | End: 2026-02-07

## 2025-08-13 ENCOUNTER — OFFICE VISIT (OUTPATIENT)
Dept: NEUROLOGY | Facility: CLINIC | Age: 60
End: 2025-08-13
Payer: COMMERCIAL

## 2025-08-13 VITALS
HEART RATE: 77 BPM | BODY MASS INDEX: 30.1 KG/M2 | HEIGHT: 63 IN | SYSTOLIC BLOOD PRESSURE: 129 MMHG | DIASTOLIC BLOOD PRESSURE: 87 MMHG | WEIGHT: 169.88 LBS

## 2025-08-13 DIAGNOSIS — M54.2 CERVICALGIA: ICD-10-CM

## 2025-08-13 DIAGNOSIS — R26.89 IMBALANCE: ICD-10-CM

## 2025-08-13 DIAGNOSIS — Z86.59 PERSONAL HISTORY OF MENTAL DISORDER: ICD-10-CM

## 2025-08-13 DIAGNOSIS — R42 VERTIGO: ICD-10-CM

## 2025-08-13 DIAGNOSIS — R41.9 COGNITIVE COMPLAINTS: ICD-10-CM

## 2025-08-13 DIAGNOSIS — R26.0 ATAXIC GAIT: Primary | ICD-10-CM

## 2025-08-13 DIAGNOSIS — R25.1 TREMORS OF NERVOUS SYSTEM: ICD-10-CM

## 2025-08-13 PROCEDURE — 3044F HG A1C LEVEL LT 7.0%: CPT | Mod: CPTII,S$GLB,, | Performed by: STUDENT IN AN ORGANIZED HEALTH CARE EDUCATION/TRAINING PROGRAM

## 2025-08-13 PROCEDURE — 1160F RVW MEDS BY RX/DR IN RCRD: CPT | Mod: CPTII,S$GLB,, | Performed by: STUDENT IN AN ORGANIZED HEALTH CARE EDUCATION/TRAINING PROGRAM

## 2025-08-13 PROCEDURE — 3074F SYST BP LT 130 MM HG: CPT | Mod: CPTII,S$GLB,, | Performed by: STUDENT IN AN ORGANIZED HEALTH CARE EDUCATION/TRAINING PROGRAM

## 2025-08-13 PROCEDURE — 3008F BODY MASS INDEX DOCD: CPT | Mod: CPTII,S$GLB,, | Performed by: STUDENT IN AN ORGANIZED HEALTH CARE EDUCATION/TRAINING PROGRAM

## 2025-08-13 PROCEDURE — 3079F DIAST BP 80-89 MM HG: CPT | Mod: CPTII,S$GLB,, | Performed by: STUDENT IN AN ORGANIZED HEALTH CARE EDUCATION/TRAINING PROGRAM

## 2025-08-13 PROCEDURE — 99215 OFFICE O/P EST HI 40 MIN: CPT | Mod: S$GLB,,, | Performed by: STUDENT IN AN ORGANIZED HEALTH CARE EDUCATION/TRAINING PROGRAM

## 2025-08-13 PROCEDURE — 99999 PR PBB SHADOW E&M-EST. PATIENT-LVL IV: CPT | Mod: PBBFAC,,, | Performed by: STUDENT IN AN ORGANIZED HEALTH CARE EDUCATION/TRAINING PROGRAM

## 2025-08-13 PROCEDURE — 1159F MED LIST DOCD IN RCRD: CPT | Mod: CPTII,S$GLB,, | Performed by: STUDENT IN AN ORGANIZED HEALTH CARE EDUCATION/TRAINING PROGRAM

## 2025-08-14 DIAGNOSIS — F31.81 BIPOLAR 2 DISORDER: ICD-10-CM

## 2025-08-14 RX ORDER — ATENOLOL 50 MG/1
50 TABLET ORAL DAILY
Qty: 90 TABLET | Refills: 3 | Status: SHIPPED | OUTPATIENT
Start: 2025-08-14

## 2025-08-14 RX ORDER — LAMOTRIGINE 25 MG/1
75 TABLET ORAL NIGHTLY
Qty: 270 TABLET | Refills: 1 | Status: SHIPPED | OUTPATIENT
Start: 2025-08-14 | End: 2026-02-10

## 2025-08-15 ENCOUNTER — TELEPHONE (OUTPATIENT)
Dept: AUDIOLOGY | Facility: CLINIC | Age: 60
End: 2025-08-15
Payer: COMMERCIAL

## 2025-08-15 DIAGNOSIS — H90.3 SENSORINEURAL HEARING LOSS, BILATERAL: Primary | ICD-10-CM

## 2025-08-19 ENCOUNTER — HOSPITAL ENCOUNTER (OUTPATIENT)
Dept: RADIOLOGY | Facility: HOSPITAL | Age: 60
Discharge: HOME | End: 2025-08-19
Attending: STUDENT IN AN ORGANIZED HEALTH CARE EDUCATION/TRAINING PROGRAM
Payer: COMMERCIAL

## 2025-08-19 DIAGNOSIS — R26.89 IMBALANCE: ICD-10-CM

## 2025-08-19 DIAGNOSIS — R26.0 ATAXIC GAIT: ICD-10-CM

## 2025-08-19 DIAGNOSIS — M54.2 CERVICALGIA: ICD-10-CM

## 2025-08-19 PROCEDURE — 72141 MRI NECK SPINE W/O DYE: CPT | Mod: TC,PO

## 2025-08-19 PROCEDURE — 72141 MRI NECK SPINE W/O DYE: CPT | Mod: 26,,, | Performed by: RADIOLOGY

## 2025-08-29 ENCOUNTER — PATIENT MESSAGE (OUTPATIENT)
Dept: FAMILY MEDICINE | Facility: CLINIC | Age: 60
End: 2025-08-29
Payer: COMMERCIAL

## 2025-09-05 ENCOUNTER — PATIENT MESSAGE (OUTPATIENT)
Dept: FAMILY MEDICINE | Facility: CLINIC | Age: 60
End: 2025-09-05
Payer: COMMERCIAL

## (undated) DEVICE — GLOVE 7.5 PROTEXIS PI MICRO

## (undated) DEVICE — HANDLE SURG LIGHT NONRIGID

## (undated) DEVICE — NDL SPINAL 25GX3.5 SPINOCAN

## (undated) DEVICE — SOL SOD CHLORIDE 0.9% 10ML

## (undated) DEVICE — APPLICATOR CHLORAPREP CLR 10.5

## (undated) DEVICE — TRAY NERVE BLOCK

## (undated) DEVICE — TOWEL OR DISP STRL BLUE 4/PK